# Patient Record
Sex: FEMALE | Race: WHITE | NOT HISPANIC OR LATINO | Employment: OTHER | URBAN - NONMETROPOLITAN AREA
[De-identification: names, ages, dates, MRNs, and addresses within clinical notes are randomized per-mention and may not be internally consistent; named-entity substitution may affect disease eponyms.]

---

## 2017-01-03 ENCOUNTER — TELEPHONE (OUTPATIENT)
Dept: NEUROLOGY | Facility: CLINIC | Age: 37
End: 2017-01-03

## 2017-01-03 NOTE — TELEPHONE ENCOUNTER
Pt called stating that she was started on omeprazole and procardia xl and feels that one of these has caused her to have a migraine and miss a day of work. She has ready on the internet that omeprazole should be used with caution in pts with migraines. She is requesting a list of medications that you approve for migraine pts to take.

## 2017-01-04 RX ORDER — RANITIDINE 150 MG/1
150 CAPSULE ORAL 2 TIMES DAILY PRN
Qty: 60 CAPSULE | Refills: 11 | Status: SHIPPED | OUTPATIENT
Start: 2017-01-04 | End: 2017-06-13

## 2017-01-04 NOTE — TELEPHONE ENCOUNTER
----- Message from PATRICIA Geiger sent at 1/4/2017  9:38 AM EST -----  Zantac sent.       ----- Message -----     From: Noris Nielson MA     Sent: 1/4/2017   9:34 AM       To: PATRICIA Geiger    Omeprazole is causing headaches. She read that any med. in that class may cause headaches. Zantac? Not interested in a referral at this time.

## 2017-01-10 ENCOUNTER — OFFICE VISIT (OUTPATIENT)
Dept: INTERNAL MEDICINE | Facility: CLINIC | Age: 37
End: 2017-01-10

## 2017-01-10 VITALS
WEIGHT: 233.19 LBS | SYSTOLIC BLOOD PRESSURE: 132 MMHG | HEIGHT: 67 IN | BODY MASS INDEX: 36.6 KG/M2 | RESPIRATION RATE: 14 BRPM | TEMPERATURE: 98.5 F | OXYGEN SATURATION: 99 % | DIASTOLIC BLOOD PRESSURE: 100 MMHG | HEART RATE: 94 BPM

## 2017-01-10 DIAGNOSIS — J06.9 ACUTE URI: Primary | ICD-10-CM

## 2017-01-10 DIAGNOSIS — J40 BRONCHITIS: ICD-10-CM

## 2017-01-10 PROCEDURE — 99213 OFFICE O/P EST LOW 20 MIN: CPT | Performed by: PHYSICIAN ASSISTANT

## 2017-01-10 RX ORDER — GUAIFENESIN AND DEXTROMETHORPHAN HYDROBROMIDE 600; 30 MG/1; MG/1
2 TABLET, EXTENDED RELEASE ORAL 2 TIMES DAILY PRN
Qty: 28 TABLET | Refills: 0 | Status: SHIPPED | OUTPATIENT
Start: 2017-01-10 | End: 2017-01-17

## 2017-01-10 RX ORDER — BENZONATATE 200 MG/1
CAPSULE ORAL
COMMUNITY
Start: 2017-01-07 | End: 2017-01-30

## 2017-01-10 RX ORDER — ALBUTEROL SULFATE 90 UG/1
2 AEROSOL, METERED RESPIRATORY (INHALATION) EVERY 4 HOURS PRN
Qty: 1 INHALER | Refills: 11 | Status: SHIPPED | OUTPATIENT
Start: 2017-01-10 | End: 2017-10-31 | Stop reason: SDUPTHER

## 2017-01-10 RX ORDER — AZITHROMYCIN 250 MG/1
TABLET, FILM COATED ORAL
Qty: 6 TABLET | Refills: 0 | Status: SHIPPED | OUTPATIENT
Start: 2017-01-10 | End: 2017-01-30

## 2017-01-10 NOTE — MR AVS SNAPSHOT
Nicole Altman   1/10/2017 2:45 PM   Office Visit    Provider:  PATRICIA Geiger   Department:  [facility] PRIMARY CARE   Dept Phone:  [phone]                Your Full Care Plan              Today's Medication Changes          These changes are accurate as of: 1/10/17  3:35 PM.  If you have any questions, ask your nurse or doctor.               New Medication(s)Ordered:     azithromycin 250 MG tablet   Commonly known as:  ZITHROMAX Z-RAJESH   Take 2 tablets the first day, then 1 tablet daily for 4 days.       guaifenesin-dextromethorphan  MG tablet sustained-release 12 hour tablet   Take 2 tablets by mouth 2 (Two) Times a Day As Needed (cough, congestion) for up to 7 days.         Medication(s)that have changed:     * albuterol (2.5 MG/3ML) 0.083% nebulizer solution   Commonly known as:  PROVENTIL   Albuterol Sulfate (2.5 MG/3ML) 0.083% Inhalation Nebulization Solution; Patient Sig: Albuterol Sulfate (2.5 MG/3ML) 0.083% Inhalation Nebulization Solution USE 1 UNIT DOSE EVERY 4-6 HOURS AS NEEDED FOR WHEEZING .; 1; 0; 05-Nov-2014; Active   What changed:  Another medication with the same name was added. Make sure you understand how and when to take each.       * albuterol 108 (90 BASE) MCG/ACT inhaler   Commonly known as:  PROVENTIL HFA;VENTOLIN HFA   Inhale 2 puffs Every 4 (Four) Hours As Needed for wheezing or shortness of air.   What changed:  You were already taking a medication with the same name, and this prescription was added. Make sure you understand how and when to take each.       * Notice:  This list has 2 medication(s) that are the same as other medications prescribed for you. Read the directions carefully, and ask your doctor or other care provider to review them with you.         Where to Get Your Medications      [facility] [place] [address] [phone] [facility] [phone]    [address] [facility] [address]     Phone:  [phone]     albuterol 108 (90 BASE) MCG/ACT inhaler    azithromycin 250 MG tablet    guaifenesin-dextromethorphan  MG tablet sustained-release 12 hour tablet                  Your Updated Medication List          This list is accurate as of: 1/10/17  3:35 PM.  Always use your most recent med list.                * albuterol (2.5 MG/3ML) 0.083% nebulizer solution   Commonly known as:  PROVENTIL       * albuterol 108 (90 BASE) MCG/ACT inhaler   Commonly known as:  PROVENTIL HFA;VENTOLIN HFA   Inhale 2 puffs Every 4 (Four) Hours As Needed for wheezing or shortness of air.       aspirin-acetaminophen-caffeine 250-250-65 MG per tablet   Commonly known as:  EXCEDRIN MIGRAINE       azithromycin 250 MG tablet   Commonly known as:  ZITHROMAX Z-RAJESH   Take 2 tablets the first day, then 1 tablet daily for 4 days.       benzonatate 200 MG capsule   Commonly known as:  TESSALON       buPROPion  MG 12 hr tablet   Commonly known as:  WELLBUTRIN SR   Take 1 tablet by mouth Every Morning.       calcium carbonate 500 MG chewable tablet   Commonly known as:  TUMS       guaifenesin-dextromethorphan  MG tablet sustained-release 12 hour tablet   Take 2 tablets by mouth 2 (Two) Times a Day As Needed (cough, congestion) for up to 7 days.       lamoTRIgine 150 MG tablet   Commonly known as:  LaMICtal   Take one tablet twice a day       lithium carbonate 300 MG capsule   Take 1 capsule in the morning and 2 capsules at night by mouth.       naproxen sodium 220 MG tablet   Commonly known as:  ALEVE       NIFEdipine XL 30 MG 24 hr tablet   Commonly known as:  PROCARDIA XL   Take 1 tablet by mouth Daily.       ondansetron ODT 4 MG disintegrating tablet   Commonly known as:  ZOFRAN-ODT   Take 1 tablet by mouth Every 8 (Eight) Hours As Needed for nausea or vomiting.       ranitidine 150 MG capsule   Commonly known as:  ZANTAC   Take 1 capsule by mouth 2 (Two) Times a Day  "As Needed for indigestion or heartburn.       * Notice:  This list has 2 medication(s) that are the same as other medications prescribed for you. Read the directions carefully, and ask your doctor or other care provider to review them with you.            You Were Diagnosed With        Codes Comments    Acute URI    -  Primary ICD-10-CM: J06.9  ICD-9-CM: 465.9     Bronchitis     ICD-10-CM: J40  ICD-9-CM: 490       Instructions     None    Patient Instructions History      MyChart Signup     Our records indicate that you have declined [facility] Epic[employer]art signup. If you would like to sign up for Epicrisishart, please email [email or web address] or call [phone] to obtain an activation code.             Other Info from Your Visit           Your Appointments     Jan 30, 2017  1:30 PM EST   Follow Up with PATRICIA Geiger   [facility] PRIMARY CARE (--)    [address]  [facility] [address] [phone]           Arrive 15 minutes prior to appointment.            Mar 21, 2017  3:30 PM EDT   Follow Up with Ben House MD, FAAN   [facility] NEUROLOGY (--)    [address]  [facility] [address] [phone]           Arrive 15 minutes prior to appointment.              Allergies     Haloperidol And Related  Anaphylaxis    Lisinopril      Nifedipine      Omeprazole      Sulfa Antibiotics      Blisters in mouth      Reason for Visit     Sinus Problem onset over the wknd. Seen at [facility] and 'ed as viral. Given T. Pearls which are not helping.     Hoarse     Fever up to 100.8      Vital Signs     Blood Pressure Pulse Temperature Respirations Height Weight    132/100 94 98.5 °F (36.9 °C) 14 67\" (170.2 cm) 233 lb 3 oz (106 kg)    Oxygen Saturation Body Mass Index Smoking Status             99% 36.52 kg/m2 Current Every Day Smoker         Problems and Diagnoses Noted     Acute upper respiratory infection    -  Primary    Bronchitis        "

## 2017-01-10 NOTE — PROGRESS NOTES
Nicole Altman is a 36 y.o. female.     Subjective   History of Present Illness   Here today with complaint of sore throat, hoarse voice, cough and fever for the last 5 days. Went to urgent care and was told it was viral. Given Tessalon Perles which are not helping. Fever up to 100.8 which reduces with Tylenol but comes right back. Denies body aches, HA, SOA, rhinorrhea or postnasal drip.  No influenza vaccine this year.         The following portions of the patient's history were reviewed and updated as appropriate: allergies, current medications, past family history, past medical history, past social history, past surgical history and problem list.    Review of Systems    Constitutional: Fever. Negative for appetite change, chills, fatigue and unexpected weight change.   HENT: sore throat.   Negative for congestion, ear pain, hearing loss, nosebleeds, postnasal drip, rhinorrhea, tinnitus and trouble swallowing.    Eyes: Negative for photophobia, discharge and visual disturbance.   Respiratory: Cough. Negative for chest tightness, shortness of breath and wheezing.    Cardiovascular: Negative for chest pain, palpitations and leg swelling.   Gastrointestinal: Negative for abdominal distention, abdominal pain, blood in stool, constipation, diarrhea, nausea and vomiting.   Endocrine: Negative for cold intolerance, heat intolerance, polydipsia, polyphagia and polyuria.   Musculoskeletal: Negative for arthralgias, back pain, joint swelling, myalgias, neck pain and neck stiffness.   Skin: Negative for color change, pallor, rash and wound.   Allergic/Immunologic: Negative for environmental allergies, food allergies and immunocompromised state.   Neurological: Negative for dizziness, tremors, seizures, weakness, numbness and headaches.   Hematological: Negative for adenopathy. Does not bruise/bleed easily.   Psychiatric/Behavioral: Negative for agitation, behavioral problems, confusion, hallucinations, self-injury and  "suicidal ideas. The patient is not nervous/anxious.      Objective    Physical Exam  Constitutional: Oriented to person, place, and time. Appears well-developed and well-nourished.   HENT: OP erythema without exudate, visible yellow PND  Head: No sinus tenderness. Normocephalic and atraumatic.   Eyes: EOM are normal. Pupils are equal, round, and reactive to light.   Neck: Normal range of motion. Neck supple.   Cardiovascular: Normal rate, regular rhythm and normal heart sounds.    Pulmonary/Chest: Rhonchi and wheezing diffusely. Effort normal. No respiratory distress.  Has no rales. Exhibits no chest wall tenderness.   Abdominal: Soft. Bowel sounds are normal. Exhibits no distension and no mass. There is no tenderness.   Musculoskeletal: Normal range of motion. Exhibits no tenderness.   Neurological: Alert and oriented to person, place, and time.   Skin: Skin is warm and dry.   Psychiatric: Has a normal mood and affect. Behavior is normal. Judgment and thought content normal.       Visit Vitals   • /100   • Pulse 94   • Temp 98.5 °F (36.9 °C)   • Resp 14   • Ht 67\" (170.2 cm)   • Wt 233 lb 3 oz (106 kg)   • SpO2 99%   • BMI 36.52 kg/m2       Nursing note and vitals reviewed.        Assessment/Plan   Nicole was seen today for sinus problem, hoarse and fever.    Diagnoses and all orders for this visit:    Acute URI  -     guaifenesin-dextromethorphan (MUCINEX DM)  MG tablet sustained-release 12 hour tablet; Take 2 tablets by mouth 2 (Two) Times a Day As Needed (cough, congestion) for up to 7 days.    Bronchitis  -     albuterol (PROVENTIL HFA;VENTOLIN HFA) 108 (90 BASE) MCG/ACT inhaler; Inhale 2 puffs Every 4 (Four) Hours As Needed for wheezing or shortness of air.  -     azithromycin (ZITHROMAX Z-RAJESH) 250 MG tablet; Take 2 tablets the first day, then 1 tablet daily for 4 days.  -     guaifenesin-dextromethorphan (MUCINEX DM)  MG tablet sustained-release 12 hour tablet; Take 2 tablets by mouth 2 " (Two) Times a Day As Needed (cough, congestion) for up to 7 days.

## 2017-01-30 ENCOUNTER — OFFICE VISIT (OUTPATIENT)
Dept: INTERNAL MEDICINE | Facility: CLINIC | Age: 37
End: 2017-01-30

## 2017-01-30 VITALS
BODY MASS INDEX: 36.76 KG/M2 | HEIGHT: 67 IN | TEMPERATURE: 98 F | HEART RATE: 92 BPM | RESPIRATION RATE: 16 BRPM | WEIGHT: 234.19 LBS | DIASTOLIC BLOOD PRESSURE: 84 MMHG | OXYGEN SATURATION: 97 % | SYSTOLIC BLOOD PRESSURE: 124 MMHG

## 2017-01-30 DIAGNOSIS — M77.11 LATERAL EPICONDYLITIS, RIGHT ELBOW: Primary | ICD-10-CM

## 2017-01-30 DIAGNOSIS — F31.60 BIPOLAR DISORDER, MIXED (HCC): ICD-10-CM

## 2017-01-30 DIAGNOSIS — J45.20 MILD INTERMITTENT ASTHMA WITHOUT COMPLICATION: ICD-10-CM

## 2017-01-30 DIAGNOSIS — Z79.899 LITHIUM USE: ICD-10-CM

## 2017-01-30 PROCEDURE — 99214 OFFICE O/P EST MOD 30 MIN: CPT | Performed by: PHYSICIAN ASSISTANT

## 2017-01-30 RX ORDER — BUPROPION HYDROCHLORIDE 150 MG/1
150 TABLET ORAL DAILY
Qty: 30 TABLET | Refills: 2 | Status: SHIPPED | OUTPATIENT
Start: 2017-01-30 | End: 2017-04-25

## 2017-01-30 RX ORDER — LITHIUM CARBONATE 300 MG/1
CAPSULE ORAL
Qty: 90 CAPSULE | Refills: 2 | Status: SHIPPED | OUTPATIENT
Start: 2017-01-30 | End: 2017-05-09 | Stop reason: SDUPTHER

## 2017-01-30 RX ORDER — LAMOTRIGINE 150 MG/1
TABLET ORAL
Qty: 60 TABLET | Refills: 2 | Status: SHIPPED | OUTPATIENT
Start: 2017-01-30 | End: 2017-05-09 | Stop reason: SDUPTHER

## 2017-01-30 RX ORDER — IBUPROFEN 200 MG
200 TABLET ORAL EVERY 6 HOURS PRN
Status: ON HOLD | COMMUNITY
End: 2017-09-09

## 2017-01-30 NOTE — MR AVS SNAPSHOT
Nicole Altman   1/30/2017 1:30 PM   Office Visit    Provider:  PATRICIA Geiger   Department:  [facility] PRIMARY CARE   Dept Phone:  [phone]                Your Full Care Plan              Today's Medication Changes          These changes are accurate as of: 1/30/17  2:15 PM.  If you have any questions, ask your nurse or doctor.               New Medication(s)Ordered:     buPROPion  MG 24 hr tablet   Commonly known as:  WELLBUTRIN XL   Take 1 tablet by mouth Daily.   Replaces:  buPROPion  MG 12 hr tablet         Stop taking medication(s)listed here:     azithromycin 250 MG tablet   Commonly known as:  ZITHROMAX Z-RAJESH           benzonatate 200 MG capsule   Commonly known as:  TESSALON           buPROPion  MG 12 hr tablet   Commonly known as:  WELLBUTRIN SR   Replaced by:  buPROPion  MG 24 hr tablet           naproxen sodium 220 MG tablet   Commonly known as:  ALEVE           NIFEdipine XL 30 MG 24 hr tablet   Commonly known as:  PROCARDIA XL                Where to Get Your Medications      These medications [facility] to [facility] [place] [address] - [address] [place] [address] - [phone]  - [phone] [address] [address]     Phone:  [phone]     buPROPion  MG 24 hr tablet    lamoTRIgine 150 MG tablet    lithium carbonate 300 MG capsule                  Your Updated Medication List          This list is accurate as of: 1/30/17  2:15 PM.  Always use your most recent med list.                * albuterol (2.5 MG/3ML) 0.083% nebulizer solution   Commonly known as:  PROVENTIL       * albuterol 108 (90 BASE) MCG/ACT inhaler   Commonly known as:  PROVENTIL HFA;VENTOLIN HFA   Inhale 2 puffs Every 4 (Four) Hours As Needed for wheezing or shortness of air.       aspirin-acetaminophen-caffeine 250-250-65 MG per tablet   Commonly known as:  EXCEDRIN MIGRAINE       buPROPion  MG 24 hr tablet    Commonly known as:  WELLBUTRIN XL   Take 1 tablet by mouth Daily.       calcium carbonate 500 MG chewable tablet   Commonly known as:  TUMS       ibuprofen 200 MG tablet   Commonly known as:  ADVIL,MOTRIN       lamoTRIgine 150 MG tablet   Commonly known as:  LaMICtal   Take one tablet twice a day       lithium carbonate 300 MG capsule   Take 1 capsule in the morning and 2 capsules at night by mouth.       ondansetron ODT 4 MG disintegrating tablet   Commonly known as:  ZOFRAN-ODT   Take 1 tablet by mouth Every 8 (Eight) Hours As Needed for nausea or vomiting.       ranitidine 150 MG capsule   Commonly known as:  ZANTAC   Take 1 capsule by mouth 2 (Two) Times a Day As Needed for indigestion or heartburn.       * Notice:  This list has 2 medication(s) that are the same as other medications prescribed for you. Read the directions carefully, and ask your doctor or other care provider to review them with you.            You Were Diagnosed With        Codes Comments    Lateral epicondylitis, right elbow    -  Primary ICD-10-CM: M77.11  ICD-9-CM: 726.32     Bipolar disorder, mixed     ICD-10-CM: F31.60  ICD-9-CM: 296.60     Lithium use     ICD-10-CM: Z79.899  ICD-9-CM: V58.69     Mild intermittent asthma without complication     ICD-10-CM: J45.20  ICD-9-CM: 493.90       Instructions     None    Patient Instructions History      MyChart Signup     Our records indicate that you have declined [facility] [employer] signup. If you would like to sign up for [employer], please email [email or web address] or call [phone] to obtain an activation code.             Other Info from Your Visit           Your Appointments     Mar 21, 2017  3:30 PM EDT   Follow Up with Ben House MD, FAAN   [facility] NEUROLOGY (--)    [address] [facility] [address] [address] [phone]           Arrive 15 minutes prior to appointment.              Allergies     Haloperidol And Related  Anaphylaxis     "Lisinopril      Nifedipine      Omeprazole      Sulfa Antibiotics      Blisters in mouth      Reason for Visit     Hypertension followup, needs pych. meds. refilled, out. Discuss referral.     Elbow Pain x several mos., discuss nebulizer.       Vital Signs     Blood Pressure Pulse Temperature Respirations Height Weight    124/84 92 98 °F (36.7 °C) 16 67\" (170.2 cm) 234 lb 3 oz (106 kg)    Oxygen Saturation Body Mass Index Smoking Status             97% 36.68 kg/m2 Current Every Day Smoker         Problems and Diagnoses Noted     Bipolar disorder, mixed    Right tennis elbow    -  Primary    Lithium use        Mild intermittent asthma          "

## 2017-01-30 NOTE — PROGRESS NOTES
Nicole Altman is a 36 y.o. female.     Subjective   History of Present Illness   Here today with concern of feeling very stressed and as if she is not handling life well in the last couple of days. Has been out of Wellbutrin and feels this could be the cause because nothing else has changed. Does not have any refills left on Lamictal and does not want to see the psychiatrist she was given an appointment with in [place]. Requesting refills of medications for Bipolar disorder.     Few months of right elbow pain. Hurt the elbow while moving a few months ago which continues to cause pain now with any lifting. Had x-ray at urgent care and was provided a pressure band which she never thought helped. Rest does not improve pain.     Uses nebulizer prn when she is sick due to asthma. Machine stopped working during last illness and is requesting a new one.     The following portions of the patient's history were reviewed and updated as appropriate: allergies, current medications, past family history, past medical history, past social history, past surgical history and problem list.    Review of Systems    Constitutional: Negative for appetite change, chills, fatigue, fever and unexpected weight change.   HENT: Negative for congestion, ear pain, hearing loss, nosebleeds, postnasal drip, rhinorrhea, sore throat, tinnitus and trouble swallowing.    Eyes: Negative for photophobia, discharge and visual disturbance.   Respiratory: Negative for cough, chest tightness, shortness of breath and wheezing.    Cardiovascular: Negative for chest pain, palpitations and leg swelling.   Gastrointestinal: Negative for abdominal distention, abdominal pain, blood in stool, constipation, diarrhea, nausea and vomiting.   Endocrine: Negative for cold intolerance, heat intolerance, polydipsia, polyphagia and polyuria.   Musculoskeletal: Right elbow pain. Negative for back pain, joint swelling, myalgias, neck pain and neck stiffness.   Skin:  "Negative for color change, pallor, rash and wound.   Allergic/Immunologic: Negative for environmental allergies, food allergies and immunocompromised state.   Neurological: Negative for dizziness, tremors, seizures, weakness, numbness and headaches.   Hematological: Negative for adenopathy. Does not bruise/bleed easily.   Psychiatric/Behavioral: Dysphoric mood, irritable. Negative for  behavioral problems, confusion, hallucinations, self-injury and suicidal ideas. The patient is not nervous/anxious.      Objective    Physical Exam  Constitutional: Oriented to person, place, and time. Appears well-developed and well-nourished.   HENT:   Head: Normocephalic and atraumatic.   Eyes: EOM are normal. Pupils are equal, round, and reactive to light.   Neck: Normal range of motion. Neck supple.   Cardiovascular: Normal rate, regular rhythm and normal heart sounds.    Pulmonary/Chest: Effort normal and breath sounds normal. No respiratory distress.  Has no wheezes or rales. Exhibits no chest wall tenderness.   Abdominal: Soft. Bowel sounds are normal. Exhibits no distension and no mass. There is no tenderness.   Musculoskeletal: right lateral epicondyle tenderness. Normal range of motion.   Neurological: Alert and oriented to person, place, and time.   Skin: Skin is warm and dry.   Psychiatric: Has a normal mood and affect. Behavior is normal. Judgment and thought content normal.       Visit Vitals   • /84   • Pulse 92   • Temp 98 °F (36.7 °C)   • Resp 16   • Ht 67\" (170.2 cm)   • Wt 234 lb 3 oz (106 kg)   • SpO2 97%   • BMI 36.68 kg/m2       Nursing note and vitals reviewed.        Assessment/Plan   Nicole was seen today for hypertension and elbow pain.    Diagnoses and all orders for this visit:    Lateral epicondylitis, right elbow  Rest, ice, NSAIDs prn. Follow up if not improving.     Bipolar disorder, mixed  -     lamoTRIgine (LaMICtal) 150 MG tablet; Take one tablet twice a day  -     lithium carbonate 300 MG " capsule; Take 1 capsule in the morning and 2 capsules at night by mouth.  -     buPROPion XL (WELLBUTRIN XL) 150 MG 24 hr tablet; Take 1 tablet by mouth Daily.    Lithium use  -     lithium carbonate 300 MG capsule; Take 1 capsule in the morning and 2 capsules at night by mouth.    Mild intermittent asthma without complication    Rx for new nebulizer.

## 2017-04-25 ENCOUNTER — OFFICE VISIT (OUTPATIENT)
Dept: INTERNAL MEDICINE | Facility: CLINIC | Age: 37
End: 2017-04-25

## 2017-04-25 VITALS
HEART RATE: 92 BPM | SYSTOLIC BLOOD PRESSURE: 160 MMHG | DIASTOLIC BLOOD PRESSURE: 110 MMHG | OXYGEN SATURATION: 94 % | TEMPERATURE: 99.2 F

## 2017-04-25 DIAGNOSIS — I10 UNCONTROLLED HYPERTENSION: Primary | ICD-10-CM

## 2017-04-25 PROCEDURE — 99213 OFFICE O/P EST LOW 20 MIN: CPT | Performed by: FAMILY MEDICINE

## 2017-04-25 RX ORDER — BUPROPION HYDROCHLORIDE 300 MG/1
TABLET ORAL
Refills: 0 | COMMUNITY
Start: 2017-04-18 | End: 2017-05-09 | Stop reason: SDUPTHER

## 2017-04-25 RX ORDER — LOSARTAN POTASSIUM 25 MG/1
25 TABLET ORAL DAILY
Qty: 30 TABLET | Refills: 3 | Status: SHIPPED | OUTPATIENT
Start: 2017-04-25 | End: 2017-05-18 | Stop reason: SDUPTHER
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
## 2017-04-25 NOTE — PATIENT INSTRUCTIONS
Managing Your High Blood Pressure  Blood pressure is a measurement of how forceful your blood is pressing against the walls of the arteries. Arteries are muscular tubes within the circulatory system. Blood pressure does not stay the same. Blood pressure rises when you are active, excited, or nervous; and it lowers during sleep and relaxation. If the numbers measuring your blood pressure stay above normal most of the time, you are at risk for health problems. High blood pressure (hypertension) is a long-term (chronic) condition in which blood pressure is elevated.  A blood pressure reading is recorded as two numbers, such as 120 over 80 (or 120/80). The first, higher number is called the systolic pressure. It is a measure of the pressure in your arteries as the heart beats. The second, lower number is called the diastolic pressure. It is a measure of the pressure in your arteries as the heart relaxes between beats.   Keeping your blood pressure in a normal range is important to your overall health and prevention of health problems, such as heart disease and stroke. When your blood pressure is uncontrolled, your heart has to work harder than normal. High blood pressure is a very common condition in adults because blood pressure tends to rise with age. Men and women are equally likely to have hypertension but at different times in life. Before age 45, men are more likely to have hypertension. After 65 years of age, women are more likely to have it. Hypertension is especially common in  Americans. This condition often has no signs or symptoms. The cause of the condition is usually not known. Your caregiver can help you come up with a plan to keep your blood pressure in a normal, healthy range.  BLOOD PRESSURE STAGES  Blood pressure is classified into four stages: normal, prehypertension, stage 1, and stage 2. Your blood pressure reading will be used to determine what type of treatment, if any, is necessary.  Appropriate treatment options are tied to these four stages:   Normal  · Systolic pressure (mm Hg): below 120.  · Diastolic pressure (mm Hg): below 80.  Prehypertension  · Systolic pressure (mm Hg): 120 to 139.  · Diastolic pressure (mm Hg): 80 to 89.  Stage 1  · Systolic pressure (mm Hg): 140 to 159.  · Diastolic pressure (mm Hg): 90 to 99.  Stage 2  · Systolic pressure (mm Hg): 160 or above.  · Diastolic pressure (mm Hg): 100 or above.  RISKS RELATED TO HIGH BLOOD PRESSURE  Managing your blood pressure is an important responsibility. Uncontrolled high blood pressure can lead to:  · A heart attack.  · A stroke.  · A weakened blood vessel (aneurysm).  · Heart failure.  · Kidney damage.  · Eye damage.  · Metabolic syndrome.  · Memory and concentration problems.  HOW TO MANAGE YOUR BLOOD PRESSURE  Blood pressure can be managed effectively with lifestyle changes and medicines (if needed). Your caregiver will help you come up with a plan to bring your blood pressure within a normal range. Your plan should include the following:  Education  · Read all information provided by your caregivers about how to control blood pressure.  · Educate yourself on the latest guidelines and treatment recommendations. New research is always being done to further define the risks and treatments for high blood pressure.  Lifestyle changes  · Control your weight.  · Avoid smoking.  · Stay physically active.  · Reduce the amount of salt in your diet.  · Reduce stress.  · Control any chronic conditions, such as high cholesterol or diabetes.  · Reduce your alcohol intake.  Medicines  · Several medicines (antihypertensive medicines) are available, if needed, to bring blood pressure within a normal range.  Communication  · Review all the medicines you take with your caregiver because there may be side effects or interactions.  · Talk with your caregiver about your diet, exercise habits, and other lifestyle factors that may be contributing to  high blood pressure.  · See your caregiver regularly. Your caregiver can help you create and adjust your plan for managing high blood pressure.  RECOMMENDATIONS FOR TREATMENT AND FOLLOW-UP   The following recommendations are based on current guidelines for managing high blood pressure in nonpregnant adults. Use these recommendations to identify the proper follow-up period or treatment option based on your blood pressure reading. You can discuss these options with your caregiver.  · Systolic pressure of 120 to 139 or diastolic pressure of 80 to 89: Follow up with your caregiver as directed.  · Systolic pressure of 140 to 160 or diastolic pressure of 90 to 100: Follow up with your caregiver within 2 months.  · Systolic pressure above 160 or diastolic pressure above 100: Follow up with your caregiver within 1 month.  · Systolic pressure above 180 or diastolic pressure above 110: Consider antihypertensive therapy; follow up with your caregiver within 1 week.  · Systolic pressure above 200 or diastolic pressure above 120: Begin antihypertensive therapy; follow up with your caregiver within 1 week.     This information is not intended to replace advice given to you by your health care provider. Make sure you discuss any questions you have with your health care provider.     Document Released: 09/11/2013 Document Reviewed: 09/11/2013  [employer] Patient Education ©2016 [employer]

## 2017-05-09 ENCOUNTER — OFFICE VISIT (OUTPATIENT)
Dept: INTERNAL MEDICINE | Facility: CLINIC | Age: 37
End: 2017-05-09

## 2017-05-09 VITALS
SYSTOLIC BLOOD PRESSURE: 148 MMHG | HEIGHT: 67 IN | DIASTOLIC BLOOD PRESSURE: 110 MMHG | HEART RATE: 84 BPM | WEIGHT: 236 LBS | BODY MASS INDEX: 37.04 KG/M2 | TEMPERATURE: 98.4 F | OXYGEN SATURATION: 99 %

## 2017-05-09 DIAGNOSIS — Z79.899 LITHIUM USE: ICD-10-CM

## 2017-05-09 DIAGNOSIS — I10 UNCONTROLLED HYPERTENSION: Primary | ICD-10-CM

## 2017-05-09 DIAGNOSIS — F32.81 PREMENSTRUAL DYSPHORIC DISORDER: ICD-10-CM

## 2017-05-09 DIAGNOSIS — F31.60 BIPOLAR DISORDER, MIXED (HCC): ICD-10-CM

## 2017-05-09 PROCEDURE — 99214 OFFICE O/P EST MOD 30 MIN: CPT | Performed by: PHYSICIAN ASSISTANT

## 2017-05-09 RX ORDER — FUROSEMIDE 20 MG/1
10 TABLET ORAL DAILY
Qty: 15 TABLET | Refills: 1 | Status: SHIPPED | OUTPATIENT
Start: 2017-05-09 | End: 2017-06-13

## 2017-05-09 RX ORDER — LITHIUM CARBONATE 300 MG/1
CAPSULE ORAL
Qty: 90 CAPSULE | Refills: 2 | Status: SHIPPED | OUTPATIENT
Start: 2017-05-09 | End: 2017-06-13 | Stop reason: SDUPTHER

## 2017-05-09 RX ORDER — BUPROPION HYDROCHLORIDE 300 MG/1
300 TABLET ORAL EVERY MORNING
Qty: 30 TABLET | Refills: 2 | Status: SHIPPED | OUTPATIENT
Start: 2017-05-09 | End: 2017-06-13 | Stop reason: SDUPTHER

## 2017-05-09 RX ORDER — LAMOTRIGINE 150 MG/1
TABLET ORAL
Qty: 60 TABLET | Refills: 2 | Status: SHIPPED | OUTPATIENT
Start: 2017-05-09 | End: 2017-06-13 | Stop reason: SDUPTHER

## 2017-05-09 RX ORDER — FLUOXETINE 10 MG/1
10 CAPSULE ORAL DAILY
Qty: 30 CAPSULE | Refills: 1 | Status: SHIPPED | OUTPATIENT
Start: 2017-05-09 | End: 2017-06-13 | Stop reason: SDUPTHER

## 2017-05-18 ENCOUNTER — TELEPHONE (OUTPATIENT)
Dept: INTERNAL MEDICINE | Facility: CLINIC | Age: 37
End: 2017-05-18

## 2017-05-18 DIAGNOSIS — I10 UNCONTROLLED HYPERTENSION: ICD-10-CM

## 2017-05-18 RX ORDER — LOSARTAN POTASSIUM 50 MG/1
50 TABLET ORAL DAILY
Qty: 30 TABLET | Refills: 5 | Status: SHIPPED | OUTPATIENT
Start: 2017-05-18 | End: 2017-06-13

## 2017-05-30 ENCOUNTER — TELEPHONE (OUTPATIENT)
Dept: INTERNAL MEDICINE | Facility: CLINIC | Age: 37
End: 2017-05-30

## 2017-06-10 ENCOUNTER — HOSPITAL ENCOUNTER (EMERGENCY)
Facility: HOSPITAL | Age: 37
Discharge: HOME | End: 2017-06-10
Attending: EMERGENCY MEDICINE | Admitting: EMERGENCY MEDICINE

## 2017-06-10 VITALS
HEART RATE: 66 BPM | BODY MASS INDEX: 39.24 KG/M2 | DIASTOLIC BLOOD PRESSURE: 92 MMHG | WEIGHT: 250 LBS | OXYGEN SATURATION: 97 % | SYSTOLIC BLOOD PRESSURE: 159 MMHG | RESPIRATION RATE: 20 BRPM | TEMPERATURE: 98.3 F | HEIGHT: 67 IN

## 2017-06-10 DIAGNOSIS — I10 ELEVATED BLOOD PRESSURE READING WITH DIAGNOSIS OF HYPERTENSION: ICD-10-CM

## 2017-06-10 DIAGNOSIS — R51.9 NONINTRACTABLE EPISODIC HEADACHE, UNSPECIFIED HEADACHE TYPE: Primary | ICD-10-CM

## 2017-06-10 PROCEDURE — 96372 THER/PROPH/DIAG INJ SC/IM: CPT

## 2017-06-10 PROCEDURE — 25010000002 KETOROLAC TROMETHAMINE PER 15 MG: Performed by: PHYSICIAN ASSISTANT

## 2017-06-10 PROCEDURE — 99284 EMERGENCY DEPT VISIT MOD MDM: CPT

## 2017-06-10 RX ORDER — ONDANSETRON 4 MG/1
4 TABLET, ORALLY DISINTEGRATING ORAL ONCE
Status: COMPLETED | OUTPATIENT
Start: 2017-06-10 | End: 2017-06-10

## 2017-06-10 RX ORDER — KETOROLAC TROMETHAMINE 30 MG/ML
60 INJECTION, SOLUTION INTRAMUSCULAR; INTRAVENOUS ONCE
Status: COMPLETED | OUTPATIENT
Start: 2017-06-10 | End: 2017-06-10

## 2017-06-10 RX ADMIN — METOPROLOL TARTRATE 25 MG: 25 TABLET ORAL at 19:47

## 2017-06-10 RX ADMIN — KETOROLAC TROMETHAMINE 60 MG: 60 INJECTION, SOLUTION INTRAMUSCULAR at 19:48

## 2017-06-10 RX ADMIN — ONDANSETRON 4 MG: 4 TABLET, ORALLY DISINTEGRATING ORAL at 19:48
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
## 2017-06-11 NOTE — DISCHARGE INSTRUCTIONS
Take your medication as directed.  Recheck with your family physician in 2-3 days if not improving.  Return to the ER for any increased headache, vomiting, fever or any other changes in your condition that concerns you.

## 2017-06-12 DIAGNOSIS — I10 UNCONTROLLED HYPERTENSION: ICD-10-CM

## 2017-06-13 ENCOUNTER — OFFICE VISIT (OUTPATIENT)
Dept: INTERNAL MEDICINE | Facility: CLINIC | Age: 37
End: 2017-06-13

## 2017-06-13 VITALS
SYSTOLIC BLOOD PRESSURE: 136 MMHG | BODY MASS INDEX: 37.2 KG/M2 | TEMPERATURE: 98.7 F | DIASTOLIC BLOOD PRESSURE: 94 MMHG | HEIGHT: 67 IN | OXYGEN SATURATION: 99 % | WEIGHT: 237 LBS | HEART RATE: 92 BPM

## 2017-06-13 DIAGNOSIS — R11.0 NAUSEA: ICD-10-CM

## 2017-06-13 DIAGNOSIS — I10 UNCONTROLLED HYPERTENSION: Primary | ICD-10-CM

## 2017-06-13 DIAGNOSIS — F32.81 PREMENSTRUAL DYSPHORIC DISORDER: ICD-10-CM

## 2017-06-13 DIAGNOSIS — F31.60 BIPOLAR DISORDER, MIXED (HCC): ICD-10-CM

## 2017-06-13 PROCEDURE — 99214 OFFICE O/P EST MOD 30 MIN: CPT | Performed by: PHYSICIAN ASSISTANT

## 2017-06-13 RX ORDER — LOSARTAN POTASSIUM 100 MG/1
100 TABLET ORAL DAILY
Qty: 90 TABLET | Refills: 1 | Status: SHIPPED | OUTPATIENT
Start: 2017-06-13 | End: 2017-06-26 | Stop reason: ALTCHOICE

## 2017-06-13 RX ORDER — LOSARTAN POTASSIUM 100 MG/1
100 TABLET ORAL DAILY
COMMUNITY
End: 2017-06-13

## 2017-06-13 RX ORDER — LAMOTRIGINE 150 MG/1
TABLET ORAL
Qty: 180 TABLET | Refills: 1 | Status: ON HOLD | OUTPATIENT
Start: 2017-06-13 | End: 2017-09-09

## 2017-06-13 RX ORDER — BUPROPION HYDROCHLORIDE 300 MG/1
300 TABLET ORAL EVERY MORNING
Qty: 90 TABLET | Refills: 1 | Status: SHIPPED | OUTPATIENT
Start: 2017-06-13 | End: 2017-11-27 | Stop reason: SDUPTHER

## 2017-06-13 RX ORDER — LITHIUM CARBONATE 300 MG/1
CAPSULE ORAL
Qty: 270 CAPSULE | Refills: 1 | Status: SHIPPED | OUTPATIENT
Start: 2017-06-13 | End: 2017-06-26 | Stop reason: SDUPTHER

## 2017-06-13 RX ORDER — FLUOXETINE HYDROCHLORIDE 20 MG/1
20 CAPSULE ORAL DAILY
Qty: 90 CAPSULE | Refills: 1 | Status: SHIPPED | OUTPATIENT
Start: 2017-06-13 | End: 2017-06-26 | Stop reason: SDUPTHER

## 2017-06-13 RX ORDER — ONDANSETRON 8 MG/1
TABLET, ORALLY DISINTEGRATING ORAL
Qty: 12 TABLET | Refills: 5 | Status: SHIPPED | OUTPATIENT
Start: 2017-06-13 | End: 2018-05-18 | Stop reason: SDUPTHER

## 2017-06-13 NOTE — PROGRESS NOTES
Nicole Altman is a 36 y.o. female.     Subjective   History of Present Illness   Here today for follow up of hypertension, PMDD, and ER visit from 6/10/17 due to migraine and elevated blood pressure.  Blood pressure was very elevated when in the ER but it lowered after she was given Metoprolol. Home BP readings have been better controlled since her Losartan was raised to 100 mg daily along with Lasix 10 mg daily.     PMDD: began Prozac 10 mg daily about 1 month ago and notes that her depression has improved and her last pre-menstrual symptoms were much more tolerable than the previous month but there is still room for improvement.  Denies suicidal ideation. Sleeping well.   Bipolar disorder well controlled with Wellbutrin, Lithium and Lamictal. Requesting 90 day supply refills.         The following portions of the patient's history were reviewed and updated as appropriate: allergies, current medications, past family history, past medical history, past social history, past surgical history and problem list.    Review of Systems    Constitutional: Negative for appetite change, chills, fatigue, fever and unexpected weight change.   HENT: Negative for congestion, ear pain, hearing loss, nosebleeds, postnasal drip, rhinorrhea, sore throat, tinnitus and trouble swallowing.    Eyes: Negative for photophobia, discharge and visual disturbance.   Respiratory: Negative for cough, chest tightness, shortness of breath and wheezing.    Cardiovascular: Negative for chest pain, palpitations and leg swelling.   Gastrointestinal: Negative for abdominal distention, abdominal pain, blood in stool, constipation, diarrhea, nausea and vomiting.   Endocrine: Negative for cold intolerance, heat intolerance, polydipsia, polyphagia and polyuria.   Musculoskeletal: Negative for arthralgias, back pain, joint swelling, myalgias, neck pain and neck stiffness.   Skin: Negative for color change, pallor, rash and wound.   Allergic/Immunologic:  "Negative for environmental allergies, food allergies and immunocompromised state.   Neurological: headaches. Negative for dizziness, tremors, seizures, weakness, numbness  Hematological: Negative for adenopathy. Does not bruise/bleed easily.   Psychiatric/Behavioral: dysphoric mood- improving. Negative for sleep disturbances, agitation, behavioral problems, confusion, hallucinations, self-injury and suicidal ideas. The patient is not nervous/anxious.      Objective    Physical Exam  Constitutional: Oriented to person, place, and time. Appears well-developed and well-nourished.   HENT:   Head: Normocephalic and atraumatic.   Eyes: EOM are normal. Pupils are equal, round, and reactive to light.   Neck: Normal range of motion. Neck supple.   Cardiovascular: Normal rate, regular rhythm and normal heart sounds.    Pulmonary/Chest: Effort normal and breath sounds normal. No respiratory distress.  Has no wheezes or rales. Exhibits no chest wall tenderness.   Abdominal: Soft. Bowel sounds are normal. Exhibits no distension and no mass. There is no tenderness.   Musculoskeletal: Normal range of motion. Exhibits no tenderness.   Neurological: Alert and oriented to person, place, and time.   Skin: Skin is warm and dry.   Psychiatric: Has a normal mood and affect. Behavior is normal. Judgment and thought content normal.       /94  Pulse 92  Temp 98.7 °F (37.1 °C)  Ht 67\" (170.2 cm)  Wt 237 lb (108 kg)  SpO2 99%  BMI 37.12 kg/m2    Nursing note and vitals reviewed.        Assessment/Plan   Nicole was seen today for follow-up and hypertension.    Diagnoses and all orders for this visit:    Uncontrolled hypertension  -     Continue Losartan and Lasix daily.  losartan (COZAAR) 100 MG tablet; Take 1 tablet by mouth Daily.  Send [facility] message in 3 days with update on blood pressure readings.     Premenstrual dysphoric disorder  -     PMDD improved with Prozac 10 but reports room for improvement. Increase dose: " FLUoxetine (PROzac) 20 MG capsule; Take 1 capsule by mouth Daily.    Nausea  -     ondansetron ODT (ZOFRAN ODT) 8 MG disintegrating tablet; Dissolve 1/2 to 1 tablet SL every 8 hours as needed for nausea.    Bipolar disorder, mixed  -     lithium carbonate 300 MG capsule; Take 1 capsule in the morning and 2 capsules at night by mouth.  -     lamoTRIgine (LaMICtal) 150 MG tablet; Take one tablet twice a day  -     buPROPion XL (WELLBUTRIN XL) 300 MG 24 hr tablet; Take 1 tablet by mouth Every Morning.      F/u in office in 3 month or sooner if needed.

## 2017-06-16 ENCOUNTER — TELEPHONE (OUTPATIENT)
Dept: INTERNAL MEDICINE | Facility: CLINIC | Age: 37
End: 2017-06-16

## 2017-06-16 DIAGNOSIS — Z79.899 LITHIUM USE: Primary | ICD-10-CM

## 2017-06-16 DIAGNOSIS — F31.32 BIPOLAR AFFECTIVE DISORDER, CURRENTLY DEPRESSED, MODERATE (HCC): ICD-10-CM

## 2017-06-25 ENCOUNTER — HOSPITAL ENCOUNTER (EMERGENCY)
Facility: HOSPITAL | Age: 37
Discharge: HOME | End: 2017-06-25
Attending: EMERGENCY MEDICINE | Admitting: EMERGENCY MEDICINE

## 2017-06-25 VITALS
DIASTOLIC BLOOD PRESSURE: 87 MMHG | BODY MASS INDEX: 36.1 KG/M2 | WEIGHT: 230 LBS | HEIGHT: 67 IN | OXYGEN SATURATION: 100 % | TEMPERATURE: 98.6 F | HEART RATE: 68 BPM | SYSTOLIC BLOOD PRESSURE: 136 MMHG | RESPIRATION RATE: 20 BRPM

## 2017-06-25 DIAGNOSIS — R51.9 NONINTRACTABLE HEADACHE, UNSPECIFIED CHRONICITY PATTERN, UNSPECIFIED HEADACHE TYPE: Primary | ICD-10-CM

## 2017-06-25 LAB
HOLD SPECIMEN: NORMAL
HOLD SPECIMEN: NORMAL
WHOLE BLOOD HOLD SPECIMEN: NORMAL
WHOLE BLOOD HOLD SPECIMEN: NORMAL

## 2017-06-25 PROCEDURE — 96361 HYDRATE IV INFUSION ADD-ON: CPT

## 2017-06-25 PROCEDURE — 25010000002 KETOROLAC TROMETHAMINE PER 15 MG: Performed by: EMERGENCY MEDICINE

## 2017-06-25 PROCEDURE — 96374 THER/PROPH/DIAG INJ IV PUSH: CPT

## 2017-06-25 PROCEDURE — 25010000002 ONDANSETRON PER 1 MG: Performed by: EMERGENCY MEDICINE

## 2017-06-25 PROCEDURE — 99284 EMERGENCY DEPT VISIT MOD MDM: CPT

## 2017-06-25 PROCEDURE — 96375 TX/PRO/DX INJ NEW DRUG ADDON: CPT

## 2017-06-25 RX ORDER — ACETAMINOPHEN 325 MG/1
650 TABLET ORAL ONCE
Status: COMPLETED | OUTPATIENT
Start: 2017-06-25 | End: 2017-06-25

## 2017-06-25 RX ORDER — KETOROLAC TROMETHAMINE 30 MG/ML
30 INJECTION, SOLUTION INTRAMUSCULAR; INTRAVENOUS ONCE
Status: COMPLETED | OUTPATIENT
Start: 2017-06-25 | End: 2017-06-25

## 2017-06-25 RX ORDER — SODIUM CHLORIDE 0.9 % (FLUSH) 0.9 %
10 SYRINGE (ML) INJECTION AS NEEDED
Status: DISCONTINUED | OUTPATIENT
Start: 2017-06-25 | End: 2017-06-25 | Stop reason: HOSPADM

## 2017-06-25 RX ORDER — ONDANSETRON 2 MG/ML
4 INJECTION INTRAMUSCULAR; INTRAVENOUS ONCE
Status: COMPLETED | OUTPATIENT
Start: 2017-06-25 | End: 2017-06-25

## 2017-06-25 RX ADMIN — KETOROLAC TROMETHAMINE 30 MG: 30 INJECTION, SOLUTION INTRAMUSCULAR; INTRAVENOUS at 16:04

## 2017-06-25 RX ADMIN — SODIUM CHLORIDE 500 ML: 9 INJECTION, SOLUTION INTRAVENOUS at 16:00

## 2017-06-25 RX ADMIN — ACETAMINOPHEN 650 MG: 325 TABLET, FILM COATED ORAL at 16:03

## 2017-06-25 RX ADMIN — ONDANSETRON 4 MG: 2 INJECTION INTRAMUSCULAR; INTRAVENOUS at 16:06
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
## 2017-06-26 DIAGNOSIS — F32.81 PREMENSTRUAL DYSPHORIC DISORDER: ICD-10-CM

## 2017-06-26 DIAGNOSIS — F31.60 BIPOLAR DISORDER, MIXED (HCC): ICD-10-CM

## 2017-06-26 DIAGNOSIS — I10 UNCONTROLLED HYPERTENSION: Primary | ICD-10-CM

## 2017-06-26 RX ORDER — IRBESARTAN 300 MG/1
300 TABLET ORAL DAILY
Qty: 30 TABLET | Refills: 11 | Status: ON HOLD | OUTPATIENT
Start: 2017-06-26 | End: 2018-06-02 | Stop reason: DRUGHIGH

## 2017-06-26 RX ORDER — LITHIUM CARBONATE 300 MG/1
CAPSULE ORAL
Qty: 90 CAPSULE | Refills: 0 | Status: ON HOLD | OUTPATIENT
Start: 2017-06-26 | End: 2017-09-09

## 2017-06-26 RX ORDER — FLUOXETINE HYDROCHLORIDE 20 MG/1
20 CAPSULE ORAL DAILY
Qty: 30 CAPSULE | Refills: 0 | Status: SHIPPED | OUTPATIENT
Start: 2017-06-26 | End: 2017-07-06 | Stop reason: SDUPTHER

## 2017-06-27 ENCOUNTER — TELEPHONE (OUTPATIENT)
Dept: NEUROLOGY | Facility: CLINIC | Age: 37
End: 2017-06-27

## 2017-06-27 NOTE — TELEPHONE ENCOUNTER
----- Message from Nicole Altman sent at 6/26/2017  7:39 PM EDT -----  Regarding: RE: Non-Urgent Medical Question  Contact: [phone]  Dr Faisal Gonzalez (Psychiatrist)    Lamictal for mood stabilization     Lithium for decreasing suicidal ideation     This combination has been working well for over a decade.     ----- Message -----  From: Brent Pardo DO  Sent: 6/26/17, 7:25 PM  To: Nicole Altman  Subject: RE: Non-Urgent Medical Question    I'm going to change your BP meds from losartan to a different medication in the same class of action, that's a bit more aggressive and see if that brings down your BP any better. Please check with your pharmacy and stop the losartan when you start the new medication, irbesartan.. Who initially put you on lamictal and lithium together?   Thank you, Dr. Pardo.    ----- Message -----     From: Nicole Altman     Sent: 6/20/2017  8:10 PM EDT       To: PATRICIA Geiger  Subject: Non-Urgent Medical Question    Hello.   A little late getting this together, surprise surprise.   Wasn't very good at recording results either but this is what I have:  Lowest: 138/94  Highest: 154/106    Also, [facility] wants to know why I'm taking lithium and lamictal together before they will fill.     #insert sarcastic comment#    This is cool.   Thanks.   Sincerely,  Neno Altman

## 2017-07-06 DIAGNOSIS — F32.81 PREMENSTRUAL DYSPHORIC DISORDER: ICD-10-CM

## 2017-07-06 RX ORDER — FLUOXETINE 10 MG/1
10 CAPSULE ORAL DAILY
Qty: 14 CAPSULE | Refills: 0 | Status: SHIPPED | OUTPATIENT
Start: 2017-07-06 | End: 2017-07-11 | Stop reason: SDUPTHER

## 2017-07-11 DIAGNOSIS — F32.81 PREMENSTRUAL DYSPHORIC DISORDER: ICD-10-CM

## 2017-07-12 RX ORDER — FLUOXETINE 10 MG/1
CAPSULE ORAL
Qty: 30 CAPSULE | Refills: 5 | Status: SHIPPED | OUTPATIENT
Start: 2017-07-12 | End: 2017-08-03 | Stop reason: SINTOL

## 2017-07-13 RX ORDER — METOPROLOL SUCCINATE 50 MG/1
50 TABLET, EXTENDED RELEASE ORAL DAILY
Qty: 30 TABLET | Refills: 5 | Status: SHIPPED | OUTPATIENT
Start: 2017-07-13 | End: 2017-08-14 | Stop reason: SDUPTHER

## 2017-07-31 RX ORDER — FUROSEMIDE 20 MG/1
10 TABLET ORAL DAILY
Qty: 15 TABLET | Refills: 3 | Status: SHIPPED | OUTPATIENT
Start: 2017-07-31 | End: 2017-09-10 | Stop reason: HOSPADM

## 2017-08-04 ENCOUNTER — PROCEDURE VISIT (OUTPATIENT)
Dept: NEUROLOGY | Facility: CLINIC | Age: 37
End: 2017-08-04

## 2017-08-04 VITALS
DIASTOLIC BLOOD PRESSURE: 88 MMHG | HEIGHT: 67 IN | WEIGHT: 235.6 LBS | BODY MASS INDEX: 36.98 KG/M2 | OXYGEN SATURATION: 98 % | HEART RATE: 80 BPM | SYSTOLIC BLOOD PRESSURE: 132 MMHG

## 2017-08-04 DIAGNOSIS — G43.019 INTRACTABLE MIGRAINE WITHOUT AURA AND WITHOUT STATUS MIGRAINOSUS: Primary | ICD-10-CM

## 2017-08-04 DIAGNOSIS — M54.2 CERVICALGIA: ICD-10-CM

## 2017-08-04 PROCEDURE — 20553 NJX 1/MLT TRIGGER POINTS 3/>: CPT | Performed by: NURSE PRACTITIONER

## 2017-08-04 NOTE — PROGRESS NOTES
Patient is suffering from headache and myofacial pain syndrome. Risks and benefits of the Trigger point injection procedure have been explained to the patient. States trigger points are helping neck spasms and headaches. Patient has no contraindications such as bleed diathesis, recent acute trauma at the muscle sites, anesthetic allergy or anticoagulation.  Mechanism of trigger point injection has been explained to patient.     Procedure Note:  1.         Patient was positioned comfortably  2.         Before injections are started, 10 Trigger Points sites are identified throughout the bilateral upper trapezius muscle, levator scapulae, and erector spinae muscles.    3.         Overlying skin area was cleaned with Alcohol swab                                                                                                              4.         Before injection, trigger points sites were palpated for local twitch and referred pain to confirms placement                         5.         Local anesthetic was mixed with Depo-Medrol (5 cc of Marcaine 0.5% mixed with 5 cc of Depo-Medrol at 40 mg/ml - for a total of 10 cc)  6.         30 gauge ½ inch needle was utilized to ensure patient comfort          7.         I started with the most tender spot in above mentioned Trigger Points   1.   Localize most tender spot within taut muscle-fibers  2.   Fix tender spot between fingers (1-2 cm in size)   1.   Prevents from rolling away from needle  2.   Controls subcutaneous bleeding  3.   Before injection, patient was instructed of possible pain on injection  4.   Direct needle at 30 degree angle off skin   8.         Insert needle into skin 1-2 cm from Trigger Point   9.         Advance needle into Trigger Point   10.       Use 1cc anesthetic at each Trigger Points identified in step #2  11.       Redirect needle and reinject                                                                                              1.    Withdraw needle to subcutaneous tissue  2.   Redirect needle into adjacent tender areas  3.   Repeat until local twitch or tautness resolves  12.   After each of the 10 injections I held direct pressure at injection site for 2 minutes to prevents hematoma formation  13.   The same procedure was repeated for other Tender Points located in the upper trapezius muscle, levator scapulae and erector spinae bilaterally  14.   Patient was instructed to gently stretch injected areas to full active range of motion in all directions and was instructed to repeat range of motion three times after injection  15.   Post-Procedure patient was instructed to avoid over-using injected area for 3-4 days   1.   Maintain active range of motion of injected muscle  2.   Patient applies ice to injected areas for a few hours  3.   Anticipate post-injection soreness for 3-4 days  There was no evidence of complications from injection was noted such as local Skin Infection  at injection site or local hematoma at injection site

## 2017-08-07 RX ORDER — BUPIVACAINE HYDROCHLORIDE 5 MG/ML
5 INJECTION, SOLUTION PERINEURAL ONCE
Status: COMPLETED | OUTPATIENT
Start: 2017-08-07 | End: 2017-08-07

## 2017-08-07 RX ORDER — METHYLPREDNISOLONE ACETATE 40 MG/ML
200 INJECTION, SUSPENSION INTRA-ARTICULAR; INTRALESIONAL; INTRAMUSCULAR; SOFT TISSUE ONCE
Status: COMPLETED | OUTPATIENT
Start: 2017-08-07 | End: 2017-08-07

## 2017-08-07 RX ADMIN — METHYLPREDNISOLONE ACETATE 200 MG: 40 INJECTION, SUSPENSION INTRA-ARTICULAR; INTRALESIONAL; INTRAMUSCULAR; SOFT TISSUE at 19:28

## 2017-08-07 RX ADMIN — BUPIVACAINE HYDROCHLORIDE 5 ML: 5 INJECTION, SOLUTION PERINEURAL at 19:29

## 2017-08-14 RX ORDER — METOPROLOL SUCCINATE 100 MG/1
100 TABLET, EXTENDED RELEASE ORAL DAILY
Qty: 30 TABLET | Refills: 5 | Status: ON HOLD | OUTPATIENT
Start: 2017-08-14 | End: 2017-09-09

## 2017-09-08 ENCOUNTER — HOSPITAL ENCOUNTER (EMERGENCY)
Facility: HOSPITAL | Age: 37
Discharge: PSYCHIATRIC OR SUBSTANCE USE FACILITY | End: 2017-09-09
Attending: EMERGENCY MEDICINE | Admitting: EMERGENCY MEDICINE

## 2017-09-08 VITALS
SYSTOLIC BLOOD PRESSURE: 141 MMHG | RESPIRATION RATE: 18 BRPM | DIASTOLIC BLOOD PRESSURE: 89 MMHG | HEART RATE: 82 BPM | BODY MASS INDEX: 36.1 KG/M2 | OXYGEN SATURATION: 98 % | TEMPERATURE: 98.5 F | WEIGHT: 230 LBS | HEIGHT: 67 IN

## 2017-09-08 DIAGNOSIS — R45.851 SUICIDAL IDEATIONS: Primary | ICD-10-CM

## 2017-09-08 LAB
ALBUMIN SERPL-MCNC: 4.1 G/DL (ref 3.5–5)
ALBUMIN/GLOB SERPL: 1.4 G/DL (ref 1–2)
ALP SERPL-CCNC: 89 U/L (ref 38–126)
ALT SERPL W P-5'-P-CCNC: 26 U/L (ref 13–69)
AMPHET+METHAMPHET UR QL: NEGATIVE
AMPHETAMINES UR QL: NEGATIVE
ANION GAP SERPL CALCULATED.3IONS-SCNC: 10.8 MMOL/L
APAP SERPL-MCNC: <10 MCG/ML
AST SERPL-CCNC: 13 U/L (ref 15–46)
B-HCG UR QL: NEGATIVE
BACTERIA UR QL AUTO: ABNORMAL /HPF
BARBITURATES UR QL SCN: NEGATIVE
BASOPHILS # BLD AUTO: 0.09 10*3/MM3 (ref 0–0.2)
BASOPHILS NFR BLD AUTO: 0.8 % (ref 0–2.5)
BENZODIAZ UR QL SCN: NEGATIVE
BILIRUB SERPL-MCNC: 0.2 MG/DL (ref 0.2–1.3)
BILIRUB UR QL STRIP: NEGATIVE
BUN BLD-MCNC: 11 MG/DL (ref 7–20)
BUN/CREAT SERPL: 10 (ref 7.1–23.5)
BUPRENORPHINE SERPL-MCNC: NEGATIVE NG/ML
CALCIUM SPEC-SCNC: 9.6 MG/DL (ref 8.4–10.2)
CANNABINOIDS SERPL QL: NEGATIVE
CHLORIDE SERPL-SCNC: 107 MMOL/L (ref 98–107)
CLARITY UR: CLEAR
CO2 SERPL-SCNC: 25 MMOL/L (ref 26–30)
COCAINE UR QL: NEGATIVE
COLOR UR: YELLOW
CREAT BLD-MCNC: 1.1 MG/DL (ref 0.6–1.3)
DEPRECATED RDW RBC AUTO: 45.4 FL (ref 37–54)
EOSINOPHIL # BLD AUTO: 0.38 10*3/MM3 (ref 0–0.7)
EOSINOPHIL NFR BLD AUTO: 3.4 % (ref 0–7)
ERYTHROCYTE [DISTWIDTH] IN BLOOD BY AUTOMATED COUNT: 13.5 % (ref 11.5–14.5)
ETHANOL BLD-MCNC: <10 MG/DL
ETHANOL UR QL: <0.01 %
GFR SERPL CREATININE-BSD FRML MDRD: 55 ML/MIN/1.73
GLOBULIN UR ELPH-MCNC: 3 GM/DL
GLUCOSE BLD-MCNC: 102 MG/DL (ref 74–98)
GLUCOSE UR STRIP-MCNC: NEGATIVE MG/DL
HCT VFR BLD AUTO: 39.2 % (ref 37–47)
HGB BLD-MCNC: 12.5 G/DL (ref 12–16)
HGB UR QL STRIP.AUTO: ABNORMAL
HYALINE CASTS UR QL AUTO: ABNORMAL /LPF
IMM GRANULOCYTES # BLD: 0.05 10*3/MM3 (ref 0–0.06)
IMM GRANULOCYTES NFR BLD: 0.5 % (ref 0–0.6)
KETONES UR QL STRIP: NEGATIVE
LEUKOCYTE ESTERASE UR QL STRIP.AUTO: NEGATIVE
LYMPHOCYTES # BLD AUTO: 2.64 10*3/MM3 (ref 0.6–3.4)
LYMPHOCYTES NFR BLD AUTO: 23.8 % (ref 10–50)
MCH RBC QN AUTO: 29.2 PG (ref 27–31)
MCHC RBC AUTO-ENTMCNC: 31.9 G/DL (ref 30–37)
MCV RBC AUTO: 91.6 FL (ref 81–99)
METHADONE UR QL SCN: NEGATIVE
MONOCYTES # BLD AUTO: 0.69 10*3/MM3 (ref 0–0.9)
MONOCYTES NFR BLD AUTO: 6.2 % (ref 0–12)
NEUTROPHILS # BLD AUTO: 7.25 10*3/MM3 (ref 2–6.9)
NEUTROPHILS NFR BLD AUTO: 65.3 % (ref 37–80)
NITRITE UR QL STRIP: NEGATIVE
NRBC BLD MANUAL-RTO: 0 /100 WBC (ref 0–0)
OPIATES UR QL: NEGATIVE
OXYCODONE UR QL SCN: NEGATIVE
PCP UR QL SCN: NEGATIVE
PH UR STRIP.AUTO: 6 [PH] (ref 5–8)
PLATELET # BLD AUTO: 356 10*3/MM3 (ref 130–400)
PMV BLD AUTO: 10.2 FL (ref 6–12)
POTASSIUM BLD-SCNC: 3.8 MMOL/L (ref 3.5–5.1)
PROPOXYPH UR QL: NEGATIVE
PROT SERPL-MCNC: 7.1 G/DL (ref 6.3–8.2)
PROT UR QL STRIP: NEGATIVE
RBC # BLD AUTO: 4.28 10*6/MM3 (ref 4.2–5.4)
RBC # UR: ABNORMAL /HPF
REF LAB TEST METHOD: ABNORMAL
SALICYLATES SERPL-MCNC: <1 MG/DL (ref 2.8–20)
SODIUM BLD-SCNC: 139 MMOL/L (ref 137–145)
SP GR UR STRIP: 1.01 (ref 1–1.03)
SQUAMOUS #/AREA URNS HPF: ABNORMAL /HPF
TRICYCLICS UR QL SCN: NEGATIVE
UROBILINOGEN UR QL STRIP: ABNORMAL
WBC NRBC COR # BLD: 11.1 10*3/MM3 (ref 4.8–10.8)
WBC UR QL AUTO: ABNORMAL /HPF

## 2017-09-08 PROCEDURE — 80053 COMPREHEN METABOLIC PANEL: CPT | Performed by: EMERGENCY MEDICINE

## 2017-09-08 PROCEDURE — 80307 DRUG TEST PRSMV CHEM ANLYZR: CPT | Performed by: EMERGENCY MEDICINE

## 2017-09-08 PROCEDURE — 87086 URINE CULTURE/COLONY COUNT: CPT | Performed by: EMERGENCY MEDICINE

## 2017-09-08 PROCEDURE — 80178 ASSAY OF LITHIUM: CPT | Performed by: EMERGENCY MEDICINE

## 2017-09-08 PROCEDURE — 81001 URINALYSIS AUTO W/SCOPE: CPT | Performed by: EMERGENCY MEDICINE

## 2017-09-08 PROCEDURE — 99283 EMERGENCY DEPT VISIT LOW MDM: CPT

## 2017-09-08 PROCEDURE — 81025 URINE PREGNANCY TEST: CPT | Performed by: EMERGENCY MEDICINE

## 2017-09-08 PROCEDURE — 85025 COMPLETE CBC W/AUTO DIFF WBC: CPT | Performed by: EMERGENCY MEDICINE

## 2017-09-08 PROCEDURE — 80306 DRUG TEST PRSMV INSTRMNT: CPT | Performed by: EMERGENCY MEDICINE

## 2017-09-08 RX ORDER — METHYLPREDNISOLONE 4 MG/1
TABLET ORAL
Qty: 1 EACH | Refills: 0 | Status: ON HOLD | OUTPATIENT
Start: 2017-09-08 | End: 2017-09-09

## 2017-09-09 ENCOUNTER — HOSPITAL ENCOUNTER (INPATIENT)
Facility: HOSPITAL | Age: 37
LOS: 1 days | Discharge: HOME | End: 2017-09-10
Attending: PSYCHIATRY & NEUROLOGY | Admitting: PSYCHIATRY & NEUROLOGY

## 2017-09-09 PROBLEM — F32.9 MDD (MAJOR DEPRESSIVE DISORDER): Status: ACTIVE | Noted: 2017-09-09

## 2017-09-09 PROCEDURE — 99223 1ST HOSP IP/OBS HIGH 75: CPT | Performed by: PSYCHIATRY & NEUROLOGY

## 2017-09-09 PROCEDURE — 93005 ELECTROCARDIOGRAM TRACING: CPT | Performed by: EMERGENCY MEDICINE

## 2017-09-09 RX ORDER — TRAZODONE HYDROCHLORIDE 50 MG/1
50 TABLET ORAL NIGHTLY PRN
Status: DISCONTINUED | OUTPATIENT
Start: 2017-09-09 | End: 2017-09-10 | Stop reason: HOSPADM

## 2017-09-09 RX ORDER — LAMOTRIGINE 100 MG/1
150 TABLET ORAL DAILY
Status: DISCONTINUED | OUTPATIENT
Start: 2017-09-09 | End: 2017-09-09

## 2017-09-09 RX ORDER — METOPROLOL SUCCINATE 50 MG/1
50 TABLET, EXTENDED RELEASE ORAL DAILY
COMMUNITY
End: 2018-05-11 | Stop reason: SDUPTHER

## 2017-09-09 RX ORDER — LITHIUM CARBONATE 300 MG/1
600 CAPSULE ORAL NIGHTLY
COMMUNITY
End: 2017-10-30 | Stop reason: SDUPTHER

## 2017-09-09 RX ORDER — ALUMINA, MAGNESIA, AND SIMETHICONE 2400; 2400; 240 MG/30ML; MG/30ML; MG/30ML
15 SUSPENSION ORAL EVERY 6 HOURS PRN
Status: DISCONTINUED | OUTPATIENT
Start: 2017-09-09 | End: 2017-09-10 | Stop reason: HOSPADM

## 2017-09-09 RX ORDER — HYDROXYZINE 50 MG/1
50 TABLET, FILM COATED ORAL EVERY 6 HOURS PRN
Status: DISCONTINUED | OUTPATIENT
Start: 2017-09-09 | End: 2017-09-10 | Stop reason: HOSPADM

## 2017-09-09 RX ORDER — FAMOTIDINE 20 MG/1
20 TABLET, FILM COATED ORAL 2 TIMES DAILY PRN
Status: DISCONTINUED | OUTPATIENT
Start: 2017-09-09 | End: 2017-09-10 | Stop reason: HOSPADM

## 2017-09-09 RX ORDER — NICOTINE 21 MG/24HR
1 PATCH, TRANSDERMAL 24 HOURS TRANSDERMAL EVERY 24 HOURS
Status: DISCONTINUED | OUTPATIENT
Start: 2017-09-09 | End: 2017-09-10 | Stop reason: ALTCHOICE

## 2017-09-09 RX ORDER — ACETAMINOPHEN 325 MG/1
650 TABLET ORAL EVERY 4 HOURS PRN
Status: DISCONTINUED | OUTPATIENT
Start: 2017-09-09 | End: 2017-09-10 | Stop reason: HOSPADM

## 2017-09-09 RX ORDER — LITHIUM CARBONATE 300 MG/1
300 CAPSULE ORAL EVERY MORNING
Status: DISCONTINUED | OUTPATIENT
Start: 2017-09-09 | End: 2017-09-10 | Stop reason: HOSPADM

## 2017-09-09 RX ORDER — ECHINACEA PURPUREA EXTRACT 125 MG
2 TABLET ORAL AS NEEDED
Status: DISCONTINUED | OUTPATIENT
Start: 2017-09-09 | End: 2017-09-10 | Stop reason: HOSPADM

## 2017-09-09 RX ORDER — FLUOXETINE HYDROCHLORIDE 20 MG/1
20 CAPSULE ORAL DAILY
COMMUNITY
End: 2017-09-10 | Stop reason: HOSPADM

## 2017-09-09 RX ORDER — BUPROPION HYDROCHLORIDE 150 MG/1
300 TABLET ORAL EVERY MORNING
Status: DISCONTINUED | OUTPATIENT
Start: 2017-09-09 | End: 2017-09-10 | Stop reason: HOSPADM

## 2017-09-09 RX ORDER — LITHIUM CARBONATE 300 MG/1
600 CAPSULE ORAL NIGHTLY
Status: DISCONTINUED | OUTPATIENT
Start: 2017-09-09 | End: 2017-09-10 | Stop reason: HOSPADM

## 2017-09-09 RX ORDER — FLUOXETINE HYDROCHLORIDE 20 MG/1
20 CAPSULE ORAL DAILY
Status: DISCONTINUED | OUTPATIENT
Start: 2017-09-09 | End: 2017-09-09

## 2017-09-09 RX ORDER — ONDANSETRON 4 MG/1
4-8 TABLET, ORALLY DISINTEGRATING ORAL EVERY 8 HOURS PRN
Status: CANCELLED | OUTPATIENT
Start: 2017-09-09

## 2017-09-09 RX ORDER — LAMOTRIGINE 150 MG/1
150 TABLET ORAL DAILY
Status: ON HOLD | COMMUNITY
End: 2017-09-10

## 2017-09-09 RX ORDER — LOSARTAN POTASSIUM 50 MG/1
100 TABLET ORAL
Status: DISCONTINUED | OUTPATIENT
Start: 2017-09-09 | End: 2017-09-10

## 2017-09-09 RX ORDER — FUROSEMIDE 20 MG/1
10 TABLET ORAL DAILY
Status: DISCONTINUED | OUTPATIENT
Start: 2017-09-09 | End: 2017-09-09

## 2017-09-09 RX ORDER — BENZTROPINE MESYLATE 1 MG/1
1 TABLET ORAL DAILY PRN
Status: DISCONTINUED | OUTPATIENT
Start: 2017-09-09 | End: 2017-09-10 | Stop reason: HOSPADM

## 2017-09-09 RX ORDER — METOPROLOL SUCCINATE 50 MG/1
50 TABLET, EXTENDED RELEASE ORAL DAILY
Status: DISCONTINUED | OUTPATIENT
Start: 2017-09-09 | End: 2017-09-10 | Stop reason: ALTCHOICE

## 2017-09-09 RX ORDER — BENZONATATE 100 MG/1
100 CAPSULE ORAL 3 TIMES DAILY PRN
Status: DISCONTINUED | OUTPATIENT
Start: 2017-09-09 | End: 2017-09-10 | Stop reason: HOSPADM

## 2017-09-09 RX ORDER — BENZTROPINE MESYLATE 1 MG/ML
0.5 INJECTION INTRAMUSCULAR; INTRAVENOUS DAILY PRN
Status: DISCONTINUED | OUTPATIENT
Start: 2017-09-09 | End: 2017-09-10 | Stop reason: HOSPADM

## 2017-09-09 RX ORDER — IBUPROFEN 600 MG/1
600 TABLET ORAL EVERY 6 HOURS PRN
Status: DISCONTINUED | OUTPATIENT
Start: 2017-09-09 | End: 2017-09-10 | Stop reason: HOSPADM

## 2017-09-09 RX ORDER — ALBUTEROL SULFATE 90 UG/1
2 AEROSOL, METERED RESPIRATORY (INHALATION) EVERY 4 HOURS PRN
Status: DISCONTINUED | OUTPATIENT
Start: 2017-09-09 | End: 2017-09-10 | Stop reason: HOSPADM

## 2017-09-09 RX ORDER — ONDANSETRON 4 MG/1
4 TABLET, FILM COATED ORAL EVERY 6 HOURS PRN
Status: DISCONTINUED | OUTPATIENT
Start: 2017-09-09 | End: 2017-09-10 | Stop reason: HOSPADM

## 2017-09-09 RX ORDER — LITHIUM CARBONATE 300 MG/1
300 CAPSULE ORAL EVERY MORNING
COMMUNITY
End: 2017-10-30

## 2017-09-09 RX ORDER — LOPERAMIDE HYDROCHLORIDE 2 MG/1
2 CAPSULE ORAL 4 TIMES DAILY PRN
Status: DISCONTINUED | OUTPATIENT
Start: 2017-09-09 | End: 2017-09-10 | Stop reason: HOSPADM

## 2017-09-09 RX ORDER — LAMOTRIGINE 100 MG/1
150 TABLET ORAL EVERY 12 HOURS SCHEDULED
Status: DISCONTINUED | OUTPATIENT
Start: 2017-09-10 | End: 2017-09-10 | Stop reason: HOSPADM

## 2017-09-09 RX ADMIN — BUPROPION HYDROCHLORIDE 300 MG: 150 TABLET, FILM COATED, EXTENDED RELEASE ORAL at 15:16

## 2017-09-09 RX ADMIN — LOSARTAN POTASSIUM 100 MG: 50 TABLET, FILM COATED ORAL at 15:17

## 2017-09-09 RX ADMIN — LITHIUM CARBONATE 600 MG: 300 CAPSULE, GELATIN COATED ORAL at 21:41

## 2017-09-09 RX ADMIN — NICOTINE 1 PATCH: 21 PATCH TRANSDERMAL at 15:17

## 2017-09-09 RX ADMIN — METOPROLOL SUCCINATE 50 MG: 50 TABLET, FILM COATED, EXTENDED RELEASE ORAL at 15:17

## 2017-09-09 RX ADMIN — LAMOTRIGINE 150 MG: 100 TABLET ORAL at 15:15

## 2017-09-09 RX ADMIN — LITHIUM CARBONATE 300 MG: 300 CAPSULE, GELATIN COATED ORAL at 15:16

## 2017-09-09 NOTE — CONSULTS
"1200 - [id number]    DATA:  Behavioral Health Navigator received request for behavioral health consult from [facility] ED staff, MD Long Torres.  Navigator met with patient at bedside.  Patient presents to ED for psychiatric evaluation.  Patient states that for x1 week she has been experiencing suicidal thoughts.  The patient reports tonight they have been escalating to the point of commanding thoughts.  She reports her thoughts are \"just do it, just fucking do it and do it right.\"  She reports the thoughts are too uncomfortable to manage safely at home, she is concerned for her safety or intent of harming herself.  Her plan is unspecific, although she states there are \"multiple ways.\"  She presents alert and oriented, although she appears to experiencing thought blocking, as she looses thoughts mid sentence and appears to stare off into the wall.  Her affect is anxious, depressed, and somewhat paranoid.  She is restless and stand by the door during the assessment, but is cooperative with redirection.  She does not appear to be an elopment risk, but rather as restless and anxious and uneasy.  She states she is in recovery with a sobriety date of 04/22/15.  Patient currently lives in a alf home and did have a fulltime job through [employer], although states she recently lost this job opportunity and it may be a trigger.  She states she has a long hx of mental health tx, but denies any psychiatric admission for over 3 years.  Patient reports she sees a therapist, Mar Mireles, whom she has great rapport with and she is seen biweekly with her last apt being Thurs 09/07.  Patient PCP is Dr. Pardo.  Patient presents with her Sister, and wants her sister involved in her tx.  Patient reports rapid mood cycling from \"minute to minute\" varying between anxious and then \"hellish suicidal ideation.\"  Patient is currently denying any VH/AH and denying HI.      ASSESSMENT:  Upon assessment, patient is alert and oriented x4.  " "Patient is denying VH/AH and does not appear to be experiencing any perceptual disturbances, although it is noted she appears to thought block and stare flat into space between thoughts.  She denies commanding AH but does state her thoughts are commanding to kill herself, \"Just do it, just fucking do it.\"  Patient appears to be slightly paranoid, anxious, and depressed.   Patient affect is anxious and restless.  Patient is actively endorsing suicidal thoughts. Navigator administered CSSRS for suicide risk assessment.  The results of patient’s CSSRS suggest that patient is having commanding thoughts to harm herself, and she is unable to contract for her own safety, feels unsafe of her own actions. Her Utox is negative for all substances, and the patient is in recovery and sober since 04/22/15.  Patient appears to be actively engaging in outpatient services but reports she and her therapist both agreed for a psychiatric evaluation for inpatient services based upon her commanding thoughts.  Patient reports to be agreeable for tx.      PLAN:  At this time, this Navigator recommends inpatient treatment based upon the above indicators.  Patient reports to be agreeable for tx.  Navigator informed [facility] ED staff, MD Long Torres who are agreeable to plan.  Navigator faxed appropriate paperwork to [facility].  Patient was accepted by [facility] MD Aguilar as communicated by Lead RN, Latonia.  Navigator contacted [facility] for transportation.  Patient to transfer to [facility] upon [facility] arrival.  All parties aware and agreeable.  Patient is resting and in the company of her sister, awaiting [facility] transport.    -DEENA Young.    "

## 2017-09-09 NOTE — PLAN OF CARE
Problem:  Patient Care Overview (Adult)  Goal: Plan of Care Review  Outcome: Ongoing (interventions implemented as appropriate)    09/09/17 1154   Coping/Psychosocial Response Interventions   Plan Of Care Reviewed With patient   Coping/Psychosocial   Patient Agreement with Plan of Care agrees   Patient Care Overview   Consent Given to Review Plan with Sister Destini Lindsey    Progress progress toward functional goals is gradual   Outcome Evaluation   Outcome Summary/Follow up Plan Therapist will offer 1-4 individual, family education, after care planning.              Goal: Interdisciplinary Rounds/Family Conference  Outcome: Ongoing (interventions implemented as appropriate)    09/09/17 1154   Interdisciplinary Rounds/Family Conf   Summary Treatment team staffing and evaluation.    Participants psychiatrist;social work;patient;nursing;family         Therapist will attempt to staff with treatment team and family as needed.          Goal: Individualization and Mutuality  Outcome: Ongoing (interventions implemented as appropriate)    09/09/17 1132 09/09/17 1154   Behavioral Health Screens   Patient Personal Strengths coping skills;expressive of needs;compliant with treatment/medications;motivated for recovery;motivated for treatment;family/social support;intellectual cognitive skills;realistic evaluation of current/future capabilities;resourceful;resilient;expressive of emotions;ability to maintain sobriety  (Has been sober since 2015. ) --    Patient Personal Strengths Comment --  Willing to seek help, sober living support.    Patient Vulnerabilities Ineffective coping, depression/anxiety.  --    Individualization   Patient Specific Preferences --  None    Patient Specific Goals --  Medication adjustment, help with depression    Patient Specific Interventions --  Therapist will offer 1-4 individual, family education, aftercare focusing on    Mutuality/Individual Preferences   What Anxieties, Fears or Concerns Do You  Have About Your Health or Care? --  That I'm stuck in terms of medicine that I can take    What Questions Do You Have About Your Health or Care? --  None    What Information Would Help Us Give You More Personalized Care? --  None              Goal: Discharge Needs Assessment  Outcome: Ongoing (interventions implemented as appropriate)    09/09/17 1154   Discharge Needs Assessment   Concerns To Be Addressed mental health concerns;suicidal concerns;coping/stress concerns   Readmission Within The Last 30 Days no previous admission in last 30 days   Community Agency Name(S) [facility] for medication management    Current Discharge Risk psychiatric illness   Discharge Planning Comments [facility]. She is refusing psychiatric follow up and plans to continue with [facility].   Discharge Needs Assessment   Outpatient/Agency/Support Group Needs outpatient psychiatric care (specify);outpatient medication management;outpatient counseling;outpatient substance abuse treatment (specify);residential services   Anticipated Discharge Disposition home or self-care   Discharge Disposition home or self-care;other (see comments)  ([facility])   Living Environment   Transportation Available public transportation         [id number]:         DATA:         Therapist met individually with patient this date to introduce role and to discuss hospitalization expectations. Patient agreeable.      Therapist completed integrated summary, treatment plan, and initiated social history this date.  Therapist is strongly recommending a family session prior to discharge.  Therapist gained consent to speak with patient's sister Destini Lindsey at [phone].  Contacted Destini this date who appeared very supportive. She identified that the patient has had hospitalizations through out her life.  She reports that the patient has  "been more depressed over the last week and \"feeling though she can't get ahead in life.\"  She reports that has been diagnosed Bipolar in the past.  Destini is supportive of patient returning to [facility] and would like to transport her home if possible.          ASSESSMENT:      Ms. Nicole Altman is a 37 year old , single, employed female.  Patient resides in sober living at [facility] and reports being sober since April 2015.  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 Patient required admission due to suicdial ideation.  Patient reports feeling like she is walking through mollasses.  She reports that she is on Lithium and can not take antidepressants due to them making her \"full blown manic.\" Patient reports history of becoming manic with episodes of being grandiose and delusional.  Patient reports history of family strain due to being \"raised by the state and institutions.\"  Patient has had many psychiatric hospitalizations including [facility].  She reports peer support position has been too stressful and that she recently had to reduce time to part time.  This has made her have some financial strain at home.       Today, patient reports that she feels as though she is withdrawn and walking through \"mollasses.\"  She reports that she has been able to maintain sobriety, but has constant negative thoughts.  \"Thoughts just run into each other and then next thing you are thinking that you might as well just kill yourself.\" Overall, patient is minimally verbal and requires motivation to elaborate on history.       She reports that she plans to follow up with her therapist Mar at [facility].  She also attend [facility] in [facility].       PLAN:       Treatment team will focus efforts on stabilizing patient's acute symptoms while providing education on healthy coping and crisis management to reduce hospitalizations.   Patient requires daily psychiatrist evaluation and " 24/7 nursing supervision to promote patient  safety.     Therapist will offer 1-4 individual sessions (20-30 minutes each), 1 therapy group daily, family education, and appropriate referral.     Therapist recommends patient continue with [facility] and sober living support.  Patient is refusing need for outpatient psychiatric referral and wants to continue with [facility].  She has scheduled appointment on 9/14.   Patient's sister reports that she should be able to transport her back to [facility] at discharge.

## 2017-09-09 NOTE — PLAN OF CARE
Problem: BH Patient Care Overview (Adult)  Goal: Plan of Care Review  Outcome: Ongoing (interventions implemented as appropriate)  Pt. Verbalizes doing program at [facility] but couldn't take it any longer and came here has been out in day room through out day minimal interaction noted with peers co's anx. /dep     09/09/17 1629   Coping/Psychosocial Response Interventions   Plan Of Care Reviewed With patient   Coping/Psychosocial   Patient Agreement with Plan of Care agrees   Patient Care Overview   Progress improving         Problem:  Overarching Goals  Goal: Adheres to Safety Considerations for Self and Others  Outcome: Ongoing (interventions implemented as appropriate)  Goal: Optimized Coping Skills in Response to Life Stressors  Outcome: Ongoing (interventions implemented as appropriate)  Goal: Develops/Participates in Therapeutic [place] to Support Successful Transition  Outcome: Ongoing (interventions implemented as appropriate)
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
## 2017-09-10 VITALS
HEART RATE: 62 BPM | SYSTOLIC BLOOD PRESSURE: 118 MMHG | RESPIRATION RATE: 18 BRPM | HEIGHT: 67 IN | DIASTOLIC BLOOD PRESSURE: 69 MMHG | OXYGEN SATURATION: 98 % | TEMPERATURE: 98 F | WEIGHT: 236 LBS | BODY MASS INDEX: 37.04 KG/M2

## 2017-09-10 LAB
BACTERIA SPEC AEROBE CULT: NO GROWTH
LITHIUM SERPL-SCNC: 0.7 MMOL/L (ref 0.6–1.2)
LITHIUM SERPL-SCNC: 1 MMOL/L (ref 0.6–1.2)

## 2017-09-10 PROCEDURE — 99238 HOSP IP/OBS DSCHRG MGMT 30/<: CPT | Performed by: PSYCHIATRY & NEUROLOGY

## 2017-09-10 PROCEDURE — 80178 ASSAY OF LITHIUM: CPT | Performed by: PSYCHIATRY & NEUROLOGY

## 2017-09-10 RX ORDER — LAMOTRIGINE 150 MG/1
150 TABLET ORAL 2 TIMES DAILY
Start: 2017-09-10 | End: 2017-10-10 | Stop reason: SDUPTHER

## 2017-09-10 RX ORDER — LOSARTAN POTASSIUM 50 MG/1
100 TABLET ORAL NIGHTLY
Status: DISCONTINUED | OUTPATIENT
Start: 2017-09-10 | End: 2017-09-10 | Stop reason: HOSPADM

## 2017-09-10 RX ORDER — METOPROLOL SUCCINATE 50 MG/1
50 TABLET, EXTENDED RELEASE ORAL NIGHTLY
Status: DISCONTINUED | OUTPATIENT
Start: 2017-09-10 | End: 2017-09-10 | Stop reason: HOSPADM

## 2017-09-10 RX ORDER — NICOTINE 21 MG/24HR
1 PATCH, TRANSDERMAL 24 HOURS TRANSDERMAL DAILY
Status: DISCONTINUED | OUTPATIENT
Start: 2017-09-10 | End: 2017-09-10 | Stop reason: HOSPADM

## 2017-09-10 RX ADMIN — LAMOTRIGINE 150 MG: 100 TABLET ORAL at 09:12

## 2017-09-10 RX ADMIN — NICOTINE 1 PATCH: 21 PATCH TRANSDERMAL at 09:57

## 2017-09-10 RX ADMIN — BUPROPION HYDROCHLORIDE 300 MG: 150 TABLET, FILM COATED, EXTENDED RELEASE ORAL at 06:11

## 2017-09-10 RX ADMIN — LITHIUM CARBONATE 300 MG: 300 CAPSULE, GELATIN COATED ORAL at 06:11

## 2017-09-10 NOTE — PLAN OF CARE
"Problem:  Patient Care Overview (Adult)  Goal: Plan of Care Review  Outcome: Ongoing (interventions implemented as appropriate)  Rated anxiety and depression as 6.  Still feels suicidal, but with no plan. States: \"I just think what's the use?\" Guarded and irritable. Out of room during evening and was relatively calm working on word search puzzles. Then she had some difficulty going to sleep and began to pace rapidly around the day room for approximately an hour, running her fingers through her hair, looking very frustrated. Refused to talk to staff and when offered something for sleep she adamantly stated: \"NO!\" Finally she was able to go to bed around 0100 and has slept since.    09/10/17 0532   Coping/Psychosocial Response Interventions   Plan Of Care Reviewed With patient   Coping/Psychosocial   Patient Agreement with Plan of Care agrees   Patient Care Overview   Progress no change         Problem:  Overarching Goals  Goal: Adheres to Safety Considerations for Self and Others  Outcome: Ongoing (interventions implemented as appropriate)  Goal: Optimized Coping Skills in Response to Life Stressors  Outcome: Ongoing (interventions implemented as appropriate)  Goal: Develops/Participates in Therapeutic [place] to Support Successful Transition  Outcome: Ongoing (interventions implemented as appropriate)      "

## 2017-09-10 NOTE — DISCHARGE SUMMARY
"      PSYCHIATRIC DISCHARGE SUMMARY     Patient Identification:  Name:  Nicole Altman  Age:  37 y.o.  Sex:  female  :  1980  MRN:  [id number]  Visit Number:  [id number]      Date of Admission:2017   Date of Discharge:  9/10/2017    Discharge Diagnosis:  Active Problems:    Bipolar disorder, mixed        Admission Diagnosis:  MDD (major depressive disorder) [F32.9]     Hospital Course  Patient is a 37 y.o. female presented with depression and suicidal ideations and was admitted to the [facility] unit for safety, further evaluation and treatment.  Her medications were reviewed and she reported that she was no longer taking Prozac as it made her more irritable and she was taking a higher dose of Lamictal. The patient was of the opinion that her current medication regimen was helpful and she didn't want any changes to be made. She states that she was resisting to come to the hospital as in the past she has had frequent admissions to the psych units and reported history of mood swings and being in restraints. The patient did well during her hospitalization and reported no suicidal thoughts and expressed her desire to be discharged. She reported that she had a good support network and she felt safe being discharged.      Mental Status Exam upon discharge:   Mood \"good\"   Affect-congruent, appropriate, stable  Thought Content-goal directed, no delusional material present  Thought process-linear, organized.  Suicidality: No SI  Homicidality: No HI  Perception: No AH/VH    Procedures Performed         Consults:   Consults     No orders found for last 30 day(s).          Pertinent Test Results: UDS negative, Li level 1.0, CBC and CMP were unremarkable    Condition on Discharge:  improved    Vital Signs  Temp:  [97.4 °F (36.3 °C)-98 °F (36.7 °C)] 98 °F (36.7 °C)  Heart Rate:  [62-68] 62  Resp:  [16-18] 18  BP: (118-138)/(69-91) 118/69      Discharge Disposition:  Home or Self Care    Discharge " Medications:   Nicole Altman   Home Medication Instructions WILLIAM:[id number]    Printed on:09/10/17 3801   Medication Information                      albuterol (PROVENTIL HFA;VENTOLIN HFA) 108 (90 BASE) MCG/ACT inhaler  Inhale 2 puffs Every 4 (Four) Hours As Needed for wheezing or shortness of air.             buPROPion XL (WELLBUTRIN XL) 300 MG 24 hr tablet  Take 1 tablet by mouth Every Morning.             irbesartan (AVAPRO) 300 MG tablet  Take 1 tablet by mouth Daily.             lamoTRIgine (LaMICtal) 150 MG tablet  Take 1 tablet by mouth 2 (Two) Times a Day.             lithium carbonate 300 MG capsule  Take 300 mg by mouth Every Morning.             lithium carbonate 300 MG capsule  Take 600 mg by mouth Every Night.             metoprolol succinate XL (TOPROL-XL) 50 MG 24 hr tablet  Take 50 mg by mouth Daily.             ondansetron ODT (ZOFRAN ODT) 8 MG disintegrating tablet  Dissolve 1/2 to 1 tablet SL every 8 hours as needed for nausea.                 Discharge Diet: Regular    Activity at Discharge: As tolerated    Follow-up Appointments  Future Appointments  Date Time Provider Department Center   9/14/2017 4:45 PM PATRICIA Geiger [facility] None   11/7/2017 9:30 AM YOANA [facility] None         Test Results Pending at Discharge      Clinician:   Divine Aguilar MD  09/10/17  1:34 PM

## 2017-09-14 ENCOUNTER — OFFICE VISIT (OUTPATIENT)
Dept: INTERNAL MEDICINE | Facility: CLINIC | Age: 37
End: 2017-09-14

## 2017-09-14 VITALS
BODY MASS INDEX: 36.88 KG/M2 | TEMPERATURE: 98.6 F | OXYGEN SATURATION: 98 % | SYSTOLIC BLOOD PRESSURE: 128 MMHG | HEART RATE: 77 BPM | WEIGHT: 235 LBS | HEIGHT: 67 IN | DIASTOLIC BLOOD PRESSURE: 76 MMHG

## 2017-09-14 DIAGNOSIS — R45.851 SUICIDAL IDEATION: ICD-10-CM

## 2017-09-14 DIAGNOSIS — F31.60 BIPOLAR DISORDER, MIXED (HCC): Primary | ICD-10-CM

## 2017-09-14 DIAGNOSIS — Z79.899 LITHIUM USE: ICD-10-CM

## 2017-09-14 PROCEDURE — 99214 OFFICE O/P EST MOD 30 MIN: CPT | Performed by: PHYSICIAN ASSISTANT

## 2017-09-14 NOTE — PROGRESS NOTES
Nicole Altman is a 37 y.o. female.     Subjective   History of Present Illness   Here today for follow up from ED visit on 9/9 where she asked to be evaluated for suicidal ideation.  She was sent to [facility] where she checked herself in for treatment for 2 nights. She reported that they came to an agreement that she was ready to leave.  She reports that she stopped taking Prozac about a month ago when she (and others arounf her) recognized hypomanic symptoms and ever since then she feels she has spiraled downward in mood and desire to live.  She reports continued suicidal ideation with intermittent periods of feeling ok. She did not feel [facility] would be very helpful for her because they were unable to provide any care she would benefit from.  Yesterday she quit her job due after a few months of frustrations.  She reports she has a good support system around her which is helpful. Sees a therapist where she lives with the last visit being last week.         The following portions of the patient's history were reviewed and updated as appropriate: allergies, current medications, past family history, past medical history, past social history, past surgical history and problem list.    Review of Systems    Constitutional: Negative for appetite change, chills, fatigue, fever and unexpected weight change.   HENT: Negative for congestion, ear pain, hearing loss, nosebleeds, postnasal drip, rhinorrhea, sore throat, tinnitus and trouble swallowing.    Eyes: Negative for photophobia, discharge and visual disturbance.   Respiratory: Negative for cough, chest tightness, shortness of breath and wheezing.    Cardiovascular: Negative for chest pain, palpitations and leg swelling.   Gastrointestinal: Negative for abdominal distention, abdominal pain, blood in stool, constipation, diarrhea, nausea and vomiting.   Endocrine: Negative for cold intolerance, heat intolerance, polydipsia, polyphagia and polyuria.   Musculoskeletal:  "Negative for arthralgias, back pain, joint swelling, myalgias, neck pain and neck stiffness.   Skin: Negative for color change, pallor, rash and wound.   Allergic/Immunologic: Negative for environmental allergies, food allergies and immunocompromised state.   Neurological: Negative for dizziness, tremors, seizures, weakness, numbness and headaches.   Hematological: Negative for adenopathy. Does not bruise/bleed easily.   Psychiatric/Behavioral: suicidal ideation, anxiety, dysphoric mood. Negative for sleep disturbances, agitation, behavioral problems, confusion, hallucinations.    Objective    Physical Exam  Constitutional: Oriented to person, place, and time. Appears well-developed and well-nourished.   HENT:   Head: Normocephalic and atraumatic.   Eyes: EOM are normal. Pupils are equal, round, and reactive to light.   Neck: Normal range of motion. Neck supple.   Cardiovascular: Normal rate, regular rhythm and normal heart sounds.    Pulmonary/Chest: Effort normal and breath sounds normal. No respiratory distress.  Has no wheezes or rales. Exhibits no chest wall tenderness.   Abdominal: Soft. Bowel sounds are normal. Exhibits no distension and no mass. There is no tenderness.   Musculoskeletal: Normal range of motion. Exhibits no tenderness.   Neurological: Alert and oriented to person, place, and time.   Skin: Skin is warm and dry.   Psychiatric: Has a dysphoric mood and affect. Behavior is normal. Judgment and thought content normal.         /76  Pulse 77  Temp 98.6 °F (37 °C)  Ht 67\" (170.2 cm)  Wt 235 lb (107 kg)  LMP 08/27/2017  SpO2 98%  BMI 36.81 kg/m2    Nursing note and vitals reviewed.      Assessment/Plan   Nicole was seen today for hypertension and follow-up.    Diagnoses and all orders for this visit:    Bipolar disorder, mixed  -     Ambulatory Referral to Psychiatry    [place] use  -     Ambulatory Referral to Psychiatry    Suicidal ideation  -     Ambulatory Referral to " Psychiatry    She refused labs to check thyroid and lithium level today and said we can do them at her next follow up. She refused to try the addition of an antipsychotic due to having tried all of them in the past which caused weight gain and excessive sleeping. She was unreceptive to admission at [facility] and declined readmission to [facility].     Advised her to go to the ER with any worsened or persistent suicidal ideation.

## 2017-09-29 DIAGNOSIS — Z01.419 ENCOUNTER FOR GYNECOLOGICAL EXAMINATION: Primary | ICD-10-CM

## 2017-10-10 DIAGNOSIS — F32.81 PREMENSTRUAL DYSPHORIC DISORDER: ICD-10-CM

## 2017-10-11 RX ORDER — LAMOTRIGINE 150 MG/1
TABLET ORAL
Qty: 60 TABLET | Refills: 1 | Status: SHIPPED | OUTPATIENT
Start: 2017-10-11 | End: 2017-12-27 | Stop reason: SDUPTHER

## 2017-10-11 RX ORDER — FLUOXETINE HYDROCHLORIDE 20 MG/1
CAPSULE ORAL
Qty: 30 CAPSULE | Refills: 0 | Status: SHIPPED | OUTPATIENT
Start: 2017-10-11 | End: 2017-11-07

## 2017-10-28 DIAGNOSIS — F31.60 BIPOLAR DISORDER, MIXED (HCC): ICD-10-CM

## 2017-10-30 RX ORDER — LITHIUM CARBONATE 300 MG/1
CAPSULE ORAL
Qty: 90 CAPSULE | Refills: 0 | OUTPATIENT
Start: 2017-10-30

## 2017-10-30 RX ORDER — LITHIUM CARBONATE 300 MG/1
CAPSULE ORAL
Qty: 90 CAPSULE | Refills: 5 | Status: SHIPPED | OUTPATIENT
Start: 2017-10-30 | End: 2018-04-28 | Stop reason: SDUPTHER

## 2017-10-31 ENCOUNTER — PATIENT MESSAGE (OUTPATIENT)
Dept: INTERNAL MEDICINE | Facility: CLINIC | Age: 37
End: 2017-10-31

## 2017-10-31 DIAGNOSIS — J40 BRONCHITIS: ICD-10-CM

## 2017-10-31 RX ORDER — ALBUTEROL SULFATE 90 UG/1
2 AEROSOL, METERED RESPIRATORY (INHALATION) EVERY 4 HOURS PRN
Qty: 1 INHALER | Refills: 11 | Status: SHIPPED | OUTPATIENT
Start: 2017-10-31 | End: 2020-03-05 | Stop reason: SDUPTHER

## 2017-10-31 NOTE — TELEPHONE ENCOUNTER
From: Nicole Altman  To: PATRICIA Geiger  Sent: 10/31/2017 9:13 AM EDT  Subject: Prescription Question    Could you please call in a refill for a ventolin inhaler (the blue one)?    Winter is here. Asthma is back. :/    The lithium was correct, thank you. :)

## 2017-11-07 ENCOUNTER — PROCEDURE VISIT (OUTPATIENT)
Dept: NEUROLOGY | Facility: CLINIC | Age: 37
End: 2017-11-07

## 2017-11-07 VITALS
DIASTOLIC BLOOD PRESSURE: 82 MMHG | HEART RATE: 83 BPM | BODY MASS INDEX: 39.24 KG/M2 | HEIGHT: 67 IN | OXYGEN SATURATION: 98 % | SYSTOLIC BLOOD PRESSURE: 120 MMHG | WEIGHT: 250 LBS

## 2017-11-07 DIAGNOSIS — G43.019 INTRACTABLE MIGRAINE WITHOUT AURA AND WITHOUT STATUS MIGRAINOSUS: Primary | ICD-10-CM

## 2017-11-07 PROCEDURE — 20553 NJX 1/MLT TRIGGER POINTS 3/>: CPT | Performed by: NURSE PRACTITIONER

## 2017-11-07 RX ORDER — BUPIVACAINE HYDROCHLORIDE 5 MG/ML
5 INJECTION, SOLUTION PERINEURAL ONCE
Status: COMPLETED | OUTPATIENT
Start: 2017-11-07 | End: 2017-11-07

## 2017-11-07 RX ORDER — METHYLPREDNISOLONE ACETATE 40 MG/ML
200 INJECTION, SUSPENSION INTRA-ARTICULAR; INTRALESIONAL; INTRAMUSCULAR; SOFT TISSUE ONCE
Status: COMPLETED | OUTPATIENT
Start: 2017-11-07 | End: 2017-11-07

## 2017-11-07 RX ADMIN — BUPIVACAINE HYDROCHLORIDE 5 ML: 5 INJECTION, SOLUTION PERINEURAL at 14:35

## 2017-11-07 RX ADMIN — METHYLPREDNISOLONE ACETATE 200 MG: 40 INJECTION, SUSPENSION INTRA-ARTICULAR; INTRALESIONAL; INTRAMUSCULAR; SOFT TISSUE at 14:35

## 2017-11-07 NOTE — PROGRESS NOTES
Patient is suffering from headache and myofacial pain syndrome.  Patient states that she has been headache free since her last trigger point injection patient very pleased with trigger point injections at this time.  Risks and benefits of the Trigger point injection procedure have been explained to the patient.  Patient has no contraindications such as bleed diathesis, recent acute trauma at the muscle sites, anesthetic allergy or anticoagulation.  Mechanism of trigger point injection has been explained to patient.     Procedure Note:  1.         Patient was positioned comfortably  2.         Before injections are started, 10 Trigger Points sites are identified throughout the bilateral upper trapezius muscle, levator scapulae, and erector spinae muscles.    3.         Overlying skin area was cleaned with Alcohol swab                                                                                                              4.         Before injection, trigger points sites were palpated for local twitch and referred pain to confirms placement                         5.         Local anesthetic was mixed with Depo-Medrol (5 cc of Marcaine 0.5% mixed with 5 cc of Depo-Medrol at 40 mg/ml - for a total of 10 cc)  6.         30 gauge ½ inch needle was utilized to ensure patient comfort          7.         I started with the most tender spot in above mentioned Trigger Points   1.   Localize most tender spot within taut muscle-fibers  2.   Fix tender spot between fingers (1-2 cm in size)   1.   Prevents from rolling away from needle  2.   Controls subcutaneous bleeding  3.   Before injection, patient was instructed of possible pain on injection  4.   Direct needle at 30 degree angle off skin   8.         Insert needle into skin 1-2 cm from Trigger Point   9.         Advance needle into Trigger Point   10.       Use 1cc anesthetic at each Trigger Points identified in step #2  11.       Redirect needle and reinject                                                                                               1.   Withdraw needle to subcutaneous tissue  2.   Redirect needle into adjacent tender areas  3.   Repeat until local twitch or tautness resolves  12.   After each of the 10 injections I held direct pressure at injection site for 2 minutes to prevents hematoma formation  13.   The same procedure was repeated for other Tender Points located in the upper trapezius muscle, levator scapulae and erector spinae bilaterally  14.   Patient was instructed to gently stretch injected areas to full active range of motion in all directions and was instructed to repeat range of motion three times after injection  15.   Post-Procedure patient was instructed to avoid over-using injected area for 3-4 days   1.   Maintain active range of motion of injected muscle  2.   Patient applies ice to injected areas for a few hours  3.   Anticipate post-injection soreness for 3-4 days  There was no evidence of complications from injection was noted such as local Skin Infection  at injection site or local hematoma at injection site

## 2017-11-25 DIAGNOSIS — F31.60 BIPOLAR DISORDER, MIXED (HCC): ICD-10-CM

## 2017-11-27 ENCOUNTER — TELEPHONE (OUTPATIENT)
Dept: INTERNAL MEDICINE | Facility: CLINIC | Age: 37
End: 2017-11-27

## 2017-11-27 DIAGNOSIS — F31.60 BIPOLAR DISORDER, MIXED (HCC): ICD-10-CM

## 2017-11-27 RX ORDER — BUPROPION HYDROCHLORIDE 300 MG/1
TABLET ORAL
Qty: 30 TABLET | Refills: 5 | Status: SHIPPED | OUTPATIENT
Start: 2017-11-27 | End: 2017-12-29

## 2017-11-27 RX ORDER — BUPROPION HYDROCHLORIDE 300 MG/1
300 TABLET ORAL EVERY MORNING
Qty: 90 TABLET | Refills: 1 | Status: SHIPPED | OUTPATIENT
Start: 2017-11-27 | End: 2018-05-21 | Stop reason: ALTCHOICE

## 2017-11-27 NOTE — TELEPHONE ENCOUNTER
----- Message from Levi Altman sent at 11/25/2017  3:52 PM EST -----  Regarding: Prescription Question  Contact: [phone]  Hello.     Hope y'all had a good thanksgiving.     Could you please call in a refill for Wellbutrin xl 300mg?    I would greatly appreciate it as would everyone around me. ;)    Please & Thanks,  Levi Altman

## 2017-12-20 ENCOUNTER — TELEPHONE (OUTPATIENT)
Dept: NEUROLOGY | Facility: CLINIC | Age: 37
End: 2017-12-20

## 2017-12-20 DIAGNOSIS — F41.9 ANXIETY: Primary | ICD-10-CM

## 2017-12-20 DIAGNOSIS — F34.1 DYSTHYMIA: ICD-10-CM

## 2017-12-20 NOTE — TELEPHONE ENCOUNTER
----- Message from Levi Altman sent at 12/14/2017  2:08 PM EST -----  Regarding: Referral Request  Contact: [phone]  [facility].     You had mentioned a doctor to assist with/ diagnosis autism? May I have a referral?    Please and thanks,  Levi Altman

## 2017-12-27 RX ORDER — LAMOTRIGINE 150 MG/1
TABLET ORAL
Qty: 60 TABLET | Refills: 1 | Status: SHIPPED | OUTPATIENT
Start: 2017-12-27 | End: 2018-02-28 | Stop reason: SDUPTHER

## 2017-12-29 ENCOUNTER — OFFICE VISIT (OUTPATIENT)
Dept: INTERNAL MEDICINE | Facility: CLINIC | Age: 37
End: 2017-12-29

## 2017-12-29 VITALS
WEIGHT: 245 LBS | HEART RATE: 87 BPM | SYSTOLIC BLOOD PRESSURE: 130 MMHG | HEIGHT: 67 IN | TEMPERATURE: 98.8 F | DIASTOLIC BLOOD PRESSURE: 80 MMHG | BODY MASS INDEX: 38.45 KG/M2 | OXYGEN SATURATION: 98 %

## 2017-12-29 DIAGNOSIS — F31.9 BIPOLAR DEPRESSION (HCC): Primary | ICD-10-CM

## 2017-12-29 PROCEDURE — 99213 OFFICE O/P EST LOW 20 MIN: CPT | Performed by: PHYSICIAN ASSISTANT

## 2017-12-29 RX ORDER — ALBUTEROL SULFATE 2.5 MG/3ML
2.5 SOLUTION RESPIRATORY (INHALATION) EVERY 4 HOURS PRN
Qty: 30 VIAL | Refills: 12 | Status: SHIPPED | OUTPATIENT
Start: 2017-12-29 | End: 2020-07-16

## 2017-12-29 NOTE — PROGRESS NOTES
Nicole Altman is a 37 y.o. female.     Subjective   History of Present Illness   Patient with history of bipolar disorder here today with concern of severe depression. The week of her last menses was particularly bad since it was [place], she was house sitting and alone as well as losing a friend the same week. She feels she needs an antidepressant due to lack of interest in doing things and constant dysphoric mood. She reports constant suicidal ideation which is much worse during her menses. She has made suicide attempts in the past via intentional overdosing. She has tried and failed many medications in the past for various reasons. Most recently she failed Prozac due to inducing hypomania after dose increase from 10 to 20 mg daily, despite it working well at the lower dose.  She has been on Wellbutrin which does not help her at all and she would like to discontinue. She takes Lithium and Lamictal as directed. She sees a counselor regularly. She refuses to use antipsychotics due to weight gain and somnolence.         The following portions of the patient's history were reviewed and updated as appropriate: allergies, current medications, past family history, past medical history, past social history, past surgical history and problem list.    Review of Systems    Constitutional: Negative for appetite change, chills, fatigue, fever and unexpected weight change.   HENT: Negative for congestion, ear pain, hearing loss, nosebleeds, postnasal drip, rhinorrhea, sore throat, tinnitus and trouble swallowing.    Eyes: Negative for photophobia, discharge and visual disturbance.   Respiratory: Negative for cough, chest tightness, shortness of breath and wheezing.    Cardiovascular: Negative for chest pain, palpitations and leg swelling.   Gastrointestinal: Negative for abdominal distention, abdominal pain, blood in stool, constipation, diarrhea, nausea and vomiting.   Endocrine: Negative for cold intolerance, heat  "intolerance, polydipsia, polyphagia and polyuria.   Musculoskeletal: Negative for arthralgias, back pain, joint swelling, myalgias, neck pain and neck stiffness.   Skin: Negative for color change, pallor, rash and wound.   Allergic/Immunologic: Negative for environmental allergies, food allergies and immunocompromised state.   Neurological: Negative for dizziness, tremors, seizures, weakness, numbness and headaches.   Hematological: Negative for adenopathy. Does not bruise/bleed easily.   Psychiatric/Behavioral: dysphoric mood, suicidal ideation. Negative for sleep disturbances, agitation, behavioral problems, confusion, hallucinations, self-injury.The patient is not nervous/anxious.      Objective    Physical Exam  Constitutional: Oriented to person, place, and time. Appears well-developed and well-nourished.   HENT:   Head: Normocephalic and atraumatic.   Eyes: EOM are normal. Pupils are equal, round, and reactive to light.   Neck: Normal range of motion. Neck supple.   Cardiovascular: Normal rate, regular rhythm and normal heart sounds.    Pulmonary/Chest: Effort normal and breath sounds normal. No respiratory distress.  Has no wheezes or rales. Exhibits no chest wall tenderness.   Abdominal: Soft. Bowel sounds are normal. Exhibits no distension and no mass. There is no tenderness.   Musculoskeletal: Normal range of motion. Exhibits no tenderness.   Neurological: Alert and oriented to person, place, and time.   Skin: Skin is warm and dry.   Psychiatric: Dysphoric mood with congruent affect. Behavior is normal. Judgment and thought content normal.       /80  Pulse 87  Temp 98.8 °F (37.1 °C)  Ht 170.2 cm (67.01\")  Wt 111 kg (245 lb)  SpO2 98%  BMI 38.36 kg/m2    Nursing note and vitals reviewed.        Assessment/Plan   Nicole was seen today for follow-up.    Diagnoses and all orders for this visit:    Bipolar depression  -     Vortioxetine HBr (TRINTELLIX) 5 MG tablet; Take 5 mg by mouth Daily With " Breakfast.    Other orders  -     albuterol (PROVENTIL) (2.5 MG/3ML) 0.083% nebulizer solution; Take 2.5 mg by nebulization Every 4 (Four) Hours As Needed for Wheezing.    Will attempt 1 month trial of the lowest dose of Trintellix.  F/u 1 month.   Also plan to discuss her situation with Dr. Mendoza for additional input.     Previous failed medications include but may not be limited to:  Effexor- numb feeling  Prozac- hypomania  Paxil- numb feeling  Cymbalta- dizziness  Wellbutrin- no efficacy  Trazodone- fatigue  Zoloft- numb feeling  Celexa- hypomania  Abilify- hypomania with lack of efficacy  Elavil- fatigue

## 2018-01-19 ENCOUNTER — OFFICE VISIT (OUTPATIENT)
Dept: OBSTETRICS AND GYNECOLOGY | Facility: CLINIC | Age: 38
End: 2018-01-19

## 2018-01-19 VITALS
HEIGHT: 67 IN | SYSTOLIC BLOOD PRESSURE: 146 MMHG | BODY MASS INDEX: 40.18 KG/M2 | DIASTOLIC BLOOD PRESSURE: 88 MMHG | WEIGHT: 256 LBS

## 2018-01-19 DIAGNOSIS — F31.60 BIPOLAR AFFECTIVE DISORDER, CURRENT EPISODE MIXED, CURRENT EPISODE SEVERITY UNSPECIFIED (HCC): ICD-10-CM

## 2018-01-19 DIAGNOSIS — G43.809 OTHER MIGRAINE WITHOUT STATUS MIGRAINOSUS, NOT INTRACTABLE: Primary | ICD-10-CM

## 2018-01-19 DIAGNOSIS — F32.81 PMDD (PREMENSTRUAL DYSPHORIC DISORDER): ICD-10-CM

## 2018-01-19 PROCEDURE — 99204 OFFICE O/P NEW MOD 45 MIN: CPT | Performed by: OBSTETRICS & GYNECOLOGY
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
## 2018-01-22 ENCOUNTER — TELEPHONE (OUTPATIENT)
Dept: OBSTETRICS AND GYNECOLOGY | Facility: CLINIC | Age: 38
End: 2018-01-22

## 2018-01-22 NOTE — TELEPHONE ENCOUNTER
Okay to see if patient wants to try oral medication; if so then okay to send in rx estradiol 0.5mg for two weeks prior to menses.

## 2018-01-22 NOTE — TELEPHONE ENCOUNTER
----- Message from Levi Altman sent at 1/22/2018 11:13 AM EST -----  Regarding: Prescription Question  Contact: [phone]  Hello.    I went to  the hormone patch at [facility] and the insurance has denied it.    Can you help?    Please and thanks,  Levi Altman

## 2018-01-24 RX ORDER — ESTRADIOL 0.5 MG/1
TABLET ORAL
Qty: 14 TABLET | Refills: 11 | Status: SHIPPED | OUTPATIENT
Start: 2018-01-24 | End: 2018-03-21

## 2018-02-06 DIAGNOSIS — F31.9 BIPOLAR DEPRESSION (HCC): ICD-10-CM

## 2018-02-06 RX ORDER — VORTIOXETINE 5 MG/1
TABLET, FILM COATED ORAL
Qty: 30 TABLET | Refills: 0 | Status: SHIPPED | OUTPATIENT
Start: 2018-02-06 | End: 2018-03-04 | Stop reason: SDUPTHER

## 2018-02-08 ENCOUNTER — OFFICE VISIT (OUTPATIENT)
Dept: INTERNAL MEDICINE | Facility: CLINIC | Age: 38
End: 2018-02-08

## 2018-02-08 VITALS
DIASTOLIC BLOOD PRESSURE: 86 MMHG | TEMPERATURE: 98.7 F | HEIGHT: 67 IN | SYSTOLIC BLOOD PRESSURE: 142 MMHG | HEART RATE: 101 BPM | BODY MASS INDEX: 39.39 KG/M2 | WEIGHT: 251 LBS | OXYGEN SATURATION: 99 %

## 2018-02-08 DIAGNOSIS — F32.81 PREMENSTRUAL DYSPHORIC DISORDER: ICD-10-CM

## 2018-02-08 DIAGNOSIS — F31.60 BIPOLAR DISORDER, MIXED (HCC): Primary | ICD-10-CM

## 2018-02-08 PROCEDURE — 99214 OFFICE O/P EST MOD 30 MIN: CPT | Performed by: PHYSICIAN ASSISTANT

## 2018-02-08 NOTE — PROGRESS NOTES
Nicole Altman is a 37 y.o. female.     Subjective   History of Present Illness   Here today for 1 month follow up of bipolar depression. She feels that Trintellix has helped her tremendously. She feels that her suicidal ideation has been significantly better, even prior to her last menses beginning.  She denies any manic or hypomanic symptoms.   She has adopted a dog and is in the process of having it certified as a service dog. She saw Dr. Zeng last month who prescribed estradiol 0.5 mg to take daily for 2 weeks leading up to her menses as treatment for premenstrual dysphoric disorder. She plans to start estradiol today since her menses are due in about 2 weeks.          The following portions of the patient's history were reviewed and updated as appropriate: allergies, current medications, past family history, past medical history, past social history, past surgical history and problem list.    Review of Systems    Constitutional: Negative for appetite change, chills, fatigue, fever and unexpected weight change.   HENT: Negative for congestion, ear pain, hearing loss, nosebleeds, postnasal drip, rhinorrhea, sore throat, tinnitus and trouble swallowing.    Eyes: Negative for photophobia, discharge and visual disturbance.   Respiratory: Negative for cough, chest tightness, shortness of breath and wheezing.    Cardiovascular: Negative for chest pain, palpitations and leg swelling.   Gastrointestinal: Negative for abdominal distention, abdominal pain, blood in stool, constipation, diarrhea, nausea and vomiting.   Endocrine: Negative for cold intolerance, heat intolerance, polydipsia, polyphagia and polyuria.   Musculoskeletal: Negative for arthralgias, back pain, joint swelling, myalgias, neck pain and neck stiffness.   Skin: Negative for color change, pallor, rash and wound.   Allergic/Immunologic: Negative for environmental allergies, food allergies and immunocompromised state.   Neurological: Negative for  "dizziness, tremors, seizures, weakness, numbness and headaches.   Hematological: Negative for adenopathy. Does not bruise/bleed easily.   Psychiatric/Behavioral: dysphoric mood, suicidal ideation, anxiety- improved.  Negative for sleep disturbances, agitation.       Objective    Physical Exam  Constitutional: Oriented to person, place, and time. Appears well-developed and well-nourished.   HENT:   Head: Normocephalic and atraumatic.   Eyes: EOM are normal. Pupils are equal, round, and reactive to light.   Neck: Normal range of motion. Neck supple.   Cardiovascular: Normal rate, regular rhythm and normal heart sounds.    Pulmonary/Chest: Effort normal and breath sounds normal. No respiratory distress.  Has no wheezes or rales. Exhibits no chest wall tenderness.   Abdominal: Soft. Bowel sounds are normal. Exhibits no distension and no mass. There is no tenderness.   Musculoskeletal: Normal range of motion. Exhibits no tenderness.   Neurological: Alert and oriented to person, place, and time.   Skin: Skin is warm and dry.   Psychiatric: Has a normal mood and affect. Behavior is normal. Judgment and thought content normal.       /86  Pulse 101  Temp 98.7 °F (37.1 °C)  Ht 170.2 cm (67.01\")  Wt 114 kg (251 lb)  SpO2 99%  BMI 39.3 kg/m2    Nursing note and vitals reviewed.        Assessment/Plan   Nicole was seen today for follow-up.    Diagnoses and all orders for this visit:    Bipolar disorder, mixed  Significant improvement in dysphoric mood and suicidal ideation since beginning Trintellix 1 month ago. Continue daily use.     Premenstrual dysphoric disorder  Begin Estradiol 0.5 mg daily today as menses are due to begin in about 2 weeks.       F/u 3 months.          "

## 2018-02-13 ENCOUNTER — PROCEDURE VISIT (OUTPATIENT)
Dept: NEUROLOGY | Facility: CLINIC | Age: 38
End: 2018-02-13

## 2018-02-13 VITALS
OXYGEN SATURATION: 99 % | SYSTOLIC BLOOD PRESSURE: 128 MMHG | WEIGHT: 251 LBS | HEIGHT: 67 IN | HEART RATE: 107 BPM | DIASTOLIC BLOOD PRESSURE: 84 MMHG | BODY MASS INDEX: 39.39 KG/M2

## 2018-02-13 DIAGNOSIS — F84.0 AUTISM: ICD-10-CM

## 2018-02-13 DIAGNOSIS — F31.9 BIPOLAR 1 DISORDER (HCC): Primary | ICD-10-CM

## 2018-02-13 DIAGNOSIS — M54.2 CERVICALGIA: ICD-10-CM

## 2018-02-13 DIAGNOSIS — G43.119 INTRACTABLE MIGRAINE WITH AURA WITHOUT STATUS MIGRAINOSUS: ICD-10-CM

## 2018-02-13 PROCEDURE — 20553 NJX 1/MLT TRIGGER POINTS 3/>: CPT | Performed by: NURSE PRACTITIONER

## 2018-02-21 ENCOUNTER — TELEPHONE (OUTPATIENT)
Dept: OBSTETRICS AND GYNECOLOGY | Facility: CLINIC | Age: 38
End: 2018-02-21

## 2018-02-21 NOTE — TELEPHONE ENCOUNTER
----- Message from Levi Altman sent at 2/21/2018  1:39 PM EST -----  Regarding: Non-Urgent Medical Question  Contact: [phone]  Hello.     I just wanted to let you know that, for the first time in over 2 decades, I am experiencing what I can only call happiness.     I did not spend half a month in suicidal despair and it is amazing how life looks different right now.     Thanks for listening to my symptoms and being knowledgeable.     Sincerely,  Levi Altman

## 2018-02-26 RX ORDER — BUPIVACAINE HYDROCHLORIDE 5 MG/ML
5 INJECTION, SOLUTION PERINEURAL ONCE
Status: COMPLETED | OUTPATIENT
Start: 2018-02-26 | End: 2018-02-26

## 2018-02-26 RX ORDER — METHYLPREDNISOLONE ACETATE 40 MG/ML
200 INJECTION, SUSPENSION INTRA-ARTICULAR; INTRALESIONAL; INTRAMUSCULAR; SOFT TISSUE ONCE
Status: COMPLETED | OUTPATIENT
Start: 2018-02-26 | End: 2018-02-26

## 2018-02-26 RX ADMIN — BUPIVACAINE HYDROCHLORIDE 5 ML: 5 INJECTION, SOLUTION PERINEURAL at 15:52

## 2018-02-26 RX ADMIN — METHYLPREDNISOLONE ACETATE 200 MG: 40 INJECTION, SUSPENSION INTRA-ARTICULAR; INTRALESIONAL; INTRAMUSCULAR; SOFT TISSUE at 15:52

## 2018-02-26 NOTE — PROGRESS NOTES
Patient is suffering from headache and myofacial pain syndrome. Pt states trigger points help with migraines and cervicalgia.Risks and benefits of the Trigger point injection procedure have been explained to the patient.  Patient has no contraindications such as bleed diathesis, recent acute trauma at the muscle sites, anesthetic allergy or anticoagulation.  Mechanism of trigger point injection has been explained to patient.     Procedure Note:  1.         Patient was positioned comfortably  2.         Before injections are started, 10 Trigger Points sites are identified throughout the bilateral upper trapezius muscle, levator scapulae, and erector spinae muscles.    3.         Overlying skin area was cleaned with Alcohol swab                                                                                                              4.         Before injection, trigger points sites were palpated for local twitch and referred pain to confirms placement                         5.         Local anesthetic was mixed with Depo-Medrol (5 cc of Marcaine 0.5% mixed with 5 cc of Depo-Medrol at 40 mg/ml - for a total of 10 cc)  6.         30 gauge ½ inch needle was utilized to ensure patient comfort          7.         I started with the most tender spot in above mentioned Trigger Points   1.   Localize most tender spot within taut muscle-fibers  2.   Fix tender spot between fingers (1-2 cm in size)   1.   Prevents from rolling away from needle  2.   Controls subcutaneous bleeding  3.   Before injection, patient was instructed of possible pain on injection  4.   Direct needle at 30 degree angle off skin   8.         Insert needle into skin 1-2 cm from Trigger Point   9.         Advance needle into Trigger Point   10.       Use 1cc anesthetic at each Trigger Points identified in step #2  11.       Redirect needle and reinject                                                                                              1.    Withdraw needle to subcutaneous tissue  2.   Redirect needle into adjacent tender areas  3.   Repeat until local twitch or tautness resolves  12.   After each of the 10 injections I held direct pressure at injection site for 2 minutes to prevents hematoma formation  13.   The same procedure was repeated for other Tender Points located in the upper trapezius muscle, levator scapulae and erector spinae bilaterally  14.   Patient was instructed to gently stretch injected areas to full active range of motion in all directions and was instructed to repeat range of motion three times after injection  15.   Post-Procedure patient was instructed to avoid over-using injected area for 3-4 days   1.   Maintain active range of motion of injected muscle  2.   Patient applies ice to injected areas for a few hours  3.   Anticipate post-injection soreness for 3-4 days  There was no evidence of complications from injection was noted such as local Skin Infection  at injection site or local hematoma at injection site     Pt requesting eval for autism.  Will consult neuropysch

## 2018-02-28 RX ORDER — LAMOTRIGINE 150 MG/1
TABLET ORAL
Qty: 60 TABLET | Refills: 1 | Status: SHIPPED | OUTPATIENT
Start: 2018-02-28 | End: 2018-04-28 | Stop reason: SDUPTHER

## 2018-03-02 RX ORDER — POLYETHYLENE GLYCOL 3350 17 G
4 POWDER IN PACKET (EA) ORAL AS NEEDED
Qty: 108 LOZENGE | Refills: 1 | Status: ON HOLD | OUTPATIENT
Start: 2018-03-02 | End: 2018-06-02

## 2018-03-04 DIAGNOSIS — F31.9 BIPOLAR DEPRESSION (HCC): ICD-10-CM

## 2018-03-05 RX ORDER — VORTIOXETINE 5 MG/1
TABLET, FILM COATED ORAL
Qty: 30 TABLET | Refills: 0 | Status: SHIPPED | OUTPATIENT
Start: 2018-03-05 | End: 2018-03-19 | Stop reason: SDUPTHER

## 2018-03-05 RX ORDER — METOPROLOL SUCCINATE 50 MG/1
TABLET, EXTENDED RELEASE ORAL
Qty: 30 TABLET | Refills: 5 | Status: ON HOLD | OUTPATIENT
Start: 2018-03-05 | End: 2018-06-02

## 2018-03-12 ENCOUNTER — TELEPHONE (OUTPATIENT)
Dept: OBSTETRICS AND GYNECOLOGY | Facility: CLINIC | Age: 38
End: 2018-03-12

## 2018-03-12 NOTE — TELEPHONE ENCOUNTER
Need to see if patient is week prior to menses; if no suicidal ideation then patient to keep f/u appt unless worsening of symptoms then can consider increasing dose.

## 2018-03-12 NOTE — TELEPHONE ENCOUNTER
----- Message from Levi Altman sent at 3/10/2018 10:31 AM EST -----  Regarding: Non-Urgent Medical Question  Contact: [phone]  Hello.     I'm 5 days into my second month on Erstradil.     While no where near as bad as previous months without medication I am still struggling.     Lots of negative thoughts/feelings. No energy. Just generally feeling like, to.     I have an appointment on the 21st but I wanted to let you know in case any changes can be made beforehand.     Please and thanks,  Levi Altman

## 2018-03-13 RX ORDER — METHYLPREDNISOLONE 4 MG/1
TABLET ORAL
Refills: 0 | OUTPATIENT
Start: 2018-03-13

## 2018-03-15 RX ORDER — METHYLPREDNISOLONE 4 MG/1
TABLET ORAL
Qty: 1 EACH | Refills: 0 | Status: SHIPPED | OUTPATIENT
Start: 2018-03-15 | End: 2018-05-11

## 2018-03-15 RX ORDER — METHYLPREDNISOLONE 4 MG/1
TABLET ORAL
Refills: 0 | OUTPATIENT
Start: 2018-03-15

## 2018-03-19 DIAGNOSIS — F31.9 BIPOLAR DEPRESSION (HCC): ICD-10-CM

## 2018-03-21 ENCOUNTER — OFFICE VISIT (OUTPATIENT)
Dept: OBSTETRICS AND GYNECOLOGY | Facility: CLINIC | Age: 38
End: 2018-03-21

## 2018-03-21 VITALS
HEIGHT: 67 IN | WEIGHT: 251 LBS | SYSTOLIC BLOOD PRESSURE: 132 MMHG | BODY MASS INDEX: 39.39 KG/M2 | DIASTOLIC BLOOD PRESSURE: 70 MMHG

## 2018-03-21 DIAGNOSIS — G43.809 OTHER MIGRAINE WITHOUT STATUS MIGRAINOSUS, NOT INTRACTABLE: ICD-10-CM

## 2018-03-21 DIAGNOSIS — F31.60 BIPOLAR AFFECTIVE DISORDER, CURRENT EPISODE MIXED, CURRENT EPISODE SEVERITY UNSPECIFIED (HCC): ICD-10-CM

## 2018-03-21 DIAGNOSIS — F32.81 PMDD (PREMENSTRUAL DYSPHORIC DISORDER): Primary | ICD-10-CM

## 2018-03-21 PROCEDURE — 99213 OFFICE O/P EST LOW 20 MIN: CPT | Performed by: OBSTETRICS & GYNECOLOGY

## 2018-03-21 RX ORDER — FUROSEMIDE 20 MG/1
TABLET ORAL
Refills: 3 | COMMUNITY
Start: 2018-02-22 | End: 2018-05-11 | Stop reason: SDUPTHER

## 2018-03-21 RX ORDER — ESTRADIOL 0.05 MG/D
1 PATCH, EXTENDED RELEASE TRANSDERMAL 2 TIMES WEEKLY
Qty: 8 PATCH | Refills: 12 | Status: ON HOLD | OUTPATIENT
Start: 2018-03-22 | End: 2018-06-02
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
## 2018-04-09 DIAGNOSIS — F31.9 BIPOLAR DEPRESSION (HCC): ICD-10-CM

## 2018-04-23 ENCOUNTER — TELEPHONE (OUTPATIENT)
Dept: OBSTETRICS AND GYNECOLOGY | Facility: CLINIC | Age: 38
End: 2018-04-23

## 2018-04-23 NOTE — TELEPHONE ENCOUNTER
----- Message from Levi Altman sent at 4/20/2018  9:23 PM EDT -----  Regarding: Prescription Question  Contact: [phone]  Hello.     I am using the estrogen patches and I've run across a problem.     One patch fell off and had to be replaced early.     I'll have 2 days without a patch before they can be refilled on the 25th of this month.    I still have a bottle of estrogen pills, could I substitute those until I can refill the patches?     Or...?    Thank you for your help in advance.     Sincerely,  Levi Altman

## 2018-04-24 NOTE — TELEPHONE ENCOUNTER
Called and spoke with pharmacy, they can give her early refill but insurance isn't going to cover it and its 84.99.

## 2018-04-30 RX ORDER — FUROSEMIDE 20 MG/1
TABLET ORAL
Qty: 15 TABLET | Refills: 2 | Status: SHIPPED | OUTPATIENT
Start: 2018-04-30 | End: 2018-05-21

## 2018-04-30 RX ORDER — LAMOTRIGINE 150 MG/1
TABLET ORAL
Qty: 60 TABLET | Refills: 1 | Status: SHIPPED | OUTPATIENT
Start: 2018-04-30 | End: 2018-05-04 | Stop reason: SDUPTHER

## 2018-04-30 RX ORDER — LITHIUM CARBONATE 300 MG/1
CAPSULE ORAL
Qty: 90 CAPSULE | Refills: 5 | Status: SHIPPED | OUTPATIENT
Start: 2018-04-30 | End: 2018-05-21 | Stop reason: SDDI

## 2018-05-04 RX ORDER — LAMOTRIGINE 150 MG/1
150 TABLET ORAL 2 TIMES DAILY
Qty: 60 TABLET | Refills: 5 | Status: SHIPPED | OUTPATIENT
Start: 2018-05-04 | End: 2018-07-27 | Stop reason: SDUPTHER

## 2018-05-11 ENCOUNTER — OFFICE VISIT (OUTPATIENT)
Dept: INTERNAL MEDICINE | Facility: CLINIC | Age: 38
End: 2018-05-11

## 2018-05-11 VITALS
HEIGHT: 67 IN | OXYGEN SATURATION: 99 % | DIASTOLIC BLOOD PRESSURE: 96 MMHG | BODY MASS INDEX: 40.34 KG/M2 | HEART RATE: 97 BPM | WEIGHT: 257 LBS | TEMPERATURE: 98.7 F | SYSTOLIC BLOOD PRESSURE: 142 MMHG

## 2018-05-11 DIAGNOSIS — F31.60 BIPOLAR DISORDER, MIXED (HCC): Primary | ICD-10-CM

## 2018-05-11 DIAGNOSIS — Z72.0 TOBACCO USE: ICD-10-CM

## 2018-05-11 DIAGNOSIS — F32.81 PREMENSTRUAL DYSPHORIC DISORDER: ICD-10-CM

## 2018-05-11 PROCEDURE — 99214 OFFICE O/P EST MOD 30 MIN: CPT | Performed by: PHYSICIAN ASSISTANT

## 2018-05-11 PROCEDURE — 99406 BEHAV CHNG SMOKING 3-10 MIN: CPT | Performed by: PHYSICIAN ASSISTANT

## 2018-05-11 NOTE — PROGRESS NOTES
Nicole Altman is a 37 y.o. female.     Subjective   History of Present Illness   Here today for follow up of bipolar depression. She reports anhedonia which improved some with dose increase of Trintellix but is still bothersome.  No recent suicidal ideation and no history of homicidal ideation.  PMDD is currently being treated with Vivelle-Dot (3 weeks on, 1 weeks off) by Dr. Zeng, though now her menstrual cycle is irregular and she is unsure when to expect it.     She is ready for smoking cessation. She has used Chantix in the past and experienced nausea and bad dreams with it but otherwise tolerated. Nicotine patches, gum and lozenges have also been tried and were unsuccessful. Wellbutrin was also unsuccessful though she continues to use.         The following portions of the patient's history were reviewed and updated as appropriate: allergies, current medications, past family history, past medical history, past social history, past surgical history and problem list.    Review of Systems    Constitutional: Negative for appetite change, chills, fatigue, fever and unexpected weight change.   HENT: Negative for congestion, ear pain, hearing loss, nosebleeds, postnasal drip, rhinorrhea, sore throat, tinnitus and trouble swallowing.    Eyes: Negative for photophobia, discharge and visual disturbance.   Respiratory: Negative for cough, chest tightness, shortness of breath and wheezing.    Cardiovascular: Negative for chest pain, palpitations and leg swelling.   Gastrointestinal: Negative for abdominal distention, abdominal pain, blood in stool, constipation, diarrhea, nausea and vomiting.   Endocrine: Irregular menses. Negative for cold intolerance, heat intolerance, polydipsia, polyphagia and polyuria.   Musculoskeletal: Negative for arthralgias, back pain, joint swelling, myalgias, neck pain and neck stiffness.   Skin: Negative for color change, pallor, rash and wound.   Allergic/Immunologic: Negative for  "environmental allergies, food allergies and immunocompromised state.   Neurological: Negative for dizziness, tremors, seizures, weakness, numbness and headaches.   Hematological: Negative for adenopathy. Does not bruise/bleed easily.   Psychiatric/Behavioral: dysphoric mood, anhedonia, anxious. Negative for sleep disturbances, agitation, behavioral problems, confusion, hallucinations, self-injury and suicidal ideas.     Objective    Physical Exam  Constitutional: No acute distress. Appears well-developed and well-nourished.   Head: Normocephalic and atraumatic.   Eyes: EOM are normal. Pupils are equal, round, and reactive to light.   Neck: Normal range of motion. Neck supple.   Cardiovascular: Normal rate, regular rhythm and normal heart sounds.    Pulmonary/Chest: Effort normal and breath sounds normal. No respiratory distress.  Has no wheezes or rales. Exhibits no chest wall tenderness.   Abdominal: Soft. Bowel sounds are normal. Exhibits no distension and no mass. There is no tenderness.   Musculoskeletal: Normal range of motion. Exhibits no tenderness.   Neurological: Alert and oriented to person, place, and time.   Skin: Skin is warm and dry.   Psychiatric: Has a normal mood and affect. Behavior is normal. Judgment and thought content normal.       /96   Pulse 97   Temp 98.7 °F (37.1 °C)   Ht 170.2 cm (67.01\")   Wt 117 kg (257 lb)   SpO2 99%   BMI 40.24 kg/m²     Nursing note and vitals reviewed.        Assessment/Plan   Diagnoses and all orders for this visit:    Bipolar disorder, mixed  Will continue Trintellix 10 mg daily until [employer] testing results can be reviewed to identify if she would respond better to an alternate treatment plan. She has tried and failed many psychotropic medications.     Premenstrual dysphoric disorder  Symptoms improving with Vivelle-Dot use, though menses now irregular. Continue follow up with GYN.     Tobacco abuse  Spent 5 minutes discussing smoking cessation. I " advised the patient of the risks in continuing to use tobacco.  I provided the patient with smoking cessation educational materials as well as discussed methods to quit smoking and patient has epxressed the willingness to quit.   After review of [employer] testing and we can ensure she is on the best possible treatment for bipolar disorder I will reconsider her request to use Chantix. She agrees with this plan.           She elected to follow up in 10 days to review [employer] results in person.

## 2018-05-18 DIAGNOSIS — R11.0 NAUSEA: ICD-10-CM

## 2018-05-18 RX ORDER — ONDANSETRON 8 MG/1
TABLET, ORALLY DISINTEGRATING ORAL
Qty: 12 TABLET | Refills: 5 | Status: SHIPPED | OUTPATIENT
Start: 2018-05-18 | End: 2019-09-13 | Stop reason: SDUPTHER

## 2018-05-21 ENCOUNTER — OFFICE VISIT (OUTPATIENT)
Dept: INTERNAL MEDICINE | Facility: CLINIC | Age: 38
End: 2018-05-21

## 2018-05-21 VITALS
SYSTOLIC BLOOD PRESSURE: 136 MMHG | OXYGEN SATURATION: 99 % | HEIGHT: 67 IN | HEART RATE: 88 BPM | DIASTOLIC BLOOD PRESSURE: 84 MMHG | TEMPERATURE: 98.7 F

## 2018-05-21 DIAGNOSIS — F31.60 BIPOLAR DISORDER, MIXED (HCC): Primary | ICD-10-CM

## 2018-05-21 DIAGNOSIS — Z72.0 TOBACCO USE: ICD-10-CM

## 2018-05-21 PROCEDURE — 99213 OFFICE O/P EST LOW 20 MIN: CPT | Performed by: PHYSICIAN ASSISTANT

## 2018-05-21 NOTE — PROGRESS NOTES
Nicole Altman is a 37 y.o. female.     Subjective   History of Present Illness   Here today for follow up of datatracker testing results. She admits she discontinued Lithium around 2 months ago and did so with a slow taper. She has noticed improvement in sedation, urinary frequency and shaking.  She does not feel there has been any considerable difference in her mood since discontinuing.  She previously used Lithium for over 10 years.  Trintellix dose increase from 10 to 15 mg daily around 10 days ago has been beneficial.  She admittedly went through a personal crisis recently and handled it better than she thought she would. Anxiety seems to be well controlled with Trintellix. No recent suicidal ideation since starting Trintellix.     She is ready to quit smoking. We completed tobacco cessation counseling 10 days ago during the last office visit.         The following portions of the patient's history were reviewed and updated as appropriate: allergies, current medications, past family history, past medical history, past social history, past surgical history and problem list.    Review of Systems    Constitutional: Negative for appetite change, chills, fatigue, fever and unexpected weight change.   HENT: Negative for congestion, ear pain, hearing loss, nosebleeds, postnasal drip, rhinorrhea, sore throat, tinnitus and trouble swallowing.    Eyes: Negative for photophobia, discharge and visual disturbance.   Respiratory: Negative for cough, chest tightness, shortness of breath and wheezing.    Cardiovascular: Negative for chest pain, palpitations and leg swelling.   Gastrointestinal: Negative for abdominal distention, abdominal pain, blood in stool, constipation, diarrhea, nausea and vomiting.   Endocrine: Negative for cold intolerance, heat intolerance, polydipsia, polyphagia and polyuria.   Musculoskeletal: Negative for arthralgias, back pain, joint swelling, myalgias, neck pain and neck stiffness.   Skin: Negative  "for color change, pallor, rash and wound.   Allergic/Immunologic: Negative for environmental allergies, food allergies and immunocompromised state.   Neurological: Negative for dizziness, tremors, seizures, weakness, numbness and headaches.   Hematological: Negative for adenopathy. Does not bruise/bleed easily.   Psychiatric/Behavioral: dysphoric mood, sleep disturbances. Negative for agitation, behavioral problems, confusion, hallucinations, self-injury and suicidal ideas.     Objective    Physical Exam  Constitutional: No acute distress. Appears well-developed and well-nourished.   Head: Normocephalic and atraumatic.   Eyes: EOM are normal. Pupils are equal, round, and reactive to light.   Neck: Normal range of motion. Neck supple.   Cardiovascular: Normal rate, regular rhythm and normal heart sounds.    Pulmonary/Chest: Effort normal and breath sounds normal. No respiratory distress.  Has no wheezes or rales. Exhibits no chest wall tenderness.   Abdominal: Soft. Bowel sounds are normal. Exhibits no distension and no mass. There is no tenderness.   Musculoskeletal: Normal range of motion. Exhibits no tenderness.   Neurological: Alert and oriented to person, place, and time.   Skin: Skin is warm and dry.   Psychiatric: Has a normal mood and affect. Behavior is normal. Judgment and thought content normal.       /84   Pulse 88   Temp 98.7 °F (37.1 °C)   Ht 170.2 cm (67.01\")   SpO2 99%     Nursing note and vitals reviewed.        Assessment/Plan   Nicole was seen today for follow-up.    Diagnoses and all orders for this visit:    Bipolar disorder, mixed  -     Vortioxetine HBr 5 MG tablet; Take one 5 mg tablet daily along with one 10 mg tablet, to equal 15 mg daily of med.  -     Vortioxetine HBr 10 MG tablet; Take one 10 mg tablet daily along with one 5 mg tablet, to equal 15 mg daily of med.  Well controlled, continue 15 mg daily.     Discussed results of [employer] testing at length and provided her with a " copy of the results to discuss with her therapist.     Tobacco use  -     varenicline (CHANTIX STARTING MONTH RAJESH) 0.5 MG X 11 & 1 MG X 42 tablet; Take 0.5 mg one daily on days 1-3 and and 0.5 mg twice daily on days 4-7.Then 1 mg twice daily for a total of 12 weeks.  We completed tobacco cessation counseling 10 days ago during the last office visit.   She is fully aware of the risks, benefits and potential side effects of Chantix and wishes to proceed with use. She will discontinue use immediately if any side effects occur, particularly mental status change.  She has tolerated Chantix in the past and was successful with smoking cessation.     F/u 1 month.

## 2018-05-22 ENCOUNTER — PROCEDURE VISIT (OUTPATIENT)
Dept: NEUROLOGY | Facility: CLINIC | Age: 38
End: 2018-05-22

## 2018-05-22 VITALS
WEIGHT: 257 LBS | BODY MASS INDEX: 40.34 KG/M2 | DIASTOLIC BLOOD PRESSURE: 78 MMHG | OXYGEN SATURATION: 99 % | HEIGHT: 67 IN | HEART RATE: 72 BPM | SYSTOLIC BLOOD PRESSURE: 126 MMHG

## 2018-05-22 DIAGNOSIS — M54.2 CERVICALGIA: ICD-10-CM

## 2018-05-22 DIAGNOSIS — G43.009 MIGRAINE WITHOUT AURA AND WITHOUT STATUS MIGRAINOSUS, NOT INTRACTABLE: Primary | ICD-10-CM

## 2018-05-22 PROCEDURE — 20553 NJX 1/MLT TRIGGER POINTS 3/>: CPT | Performed by: NURSE PRACTITIONER

## 2018-05-22 RX ORDER — METHYLPREDNISOLONE ACETATE 40 MG/ML
200 INJECTION, SUSPENSION INTRA-ARTICULAR; INTRALESIONAL; INTRAMUSCULAR; SOFT TISSUE ONCE
Status: COMPLETED | OUTPATIENT
Start: 2018-05-22 | End: 2018-05-22

## 2018-05-22 RX ORDER — BUPIVACAINE HYDROCHLORIDE 5 MG/ML
5 INJECTION, SOLUTION PERINEURAL ONCE
Status: COMPLETED | OUTPATIENT
Start: 2018-05-22 | End: 2018-05-22

## 2018-05-22 RX ADMIN — BUPIVACAINE HYDROCHLORIDE 5 ML: 5 INJECTION, SOLUTION PERINEURAL at 11:30

## 2018-05-22 RX ADMIN — METHYLPREDNISOLONE ACETATE 200 MG: 40 INJECTION, SUSPENSION INTRA-ARTICULAR; INTRALESIONAL; INTRAMUSCULAR; SOFT TISSUE at 11:29

## 2018-05-22 NOTE — PROGRESS NOTES
Patient is suffering from headache and myofacial pain syndrome.  Patient states her neck pain and migraines have improved with trigger point.  Risks and benefits of the Trigger point injection procedure have been explained to the patient.  Patient has no contraindications such as bleed diathesis, recent acute trauma at the muscle sites, anesthetic allergy or anticoagulation.  Mechanism of trigger point injection has been explained to patient.     Procedure Note:  1.         Patient was positioned comfortably  2.         Before injections are started, 10 Trigger Points sites are identified throughout the bilateral upper trapezius muscle, levator scapulae, and erector spinae muscles.    3.         Overlying skin area was cleaned with Alcohol swab                                                                                                              4.         Before injection, trigger points sites were palpated for local twitch and referred pain to confirms placement                         5.         Local anesthetic was mixed with Depo-Medrol (5 cc of Marcaine 0.5% mixed with 5 cc of Depo-Medrol at 40 mg/ml - for a total of 10 cc)  6.         30 gauge ½ inch needle was utilized to ensure patient comfort          7.         I started with the most tender spot in above mentioned Trigger Points   1.   Localize most tender spot within taut muscle-fibers  2.   Fix tender spot between fingers (1-2 cm in size)   1.   Prevents from rolling away from needle  2.   Controls subcutaneous bleeding  3.   Before injection, patient was instructed of possible pain on injection  4.   Direct needle at 30 degree angle off skin   8.         Insert needle into skin 1-2 cm from Trigger Point   9.         Advance needle into Trigger Point   10.       Use 1cc anesthetic at each Trigger Points identified in step #2  11.       Redirect needle and reinject                                                                                               1.   Withdraw needle to subcutaneous tissue  2.   Redirect needle into adjacent tender areas  3.   Repeat until local twitch or tautness resolves  12.   After each of the 10 injections I held direct pressure at injection site for 2 minutes to prevents hematoma formation  13.   The same procedure was repeated for other Tender Points located in the upper trapezius muscle, levator scapulae and erector spinae bilaterally  14.   Patient was instructed to gently stretch injected areas to full active range of motion in all directions and was instructed to repeat range of motion three times after injection  15.   Post-Procedure patient was instructed to avoid over-using injected area for 3-4 days   1.   Maintain active range of motion of injected muscle  2.   Patient applies ice to injected areas for a few hours  3.   Anticipate post-injection soreness for 3-4 days  There was no evidence of complications from injection was noted such as local Skin Infection  at injection site or local hematoma at injection site

## 2018-06-01 ENCOUNTER — OFFICE VISIT (OUTPATIENT)
Dept: INTERNAL MEDICINE | Facility: CLINIC | Age: 38
End: 2018-06-01

## 2018-06-01 ENCOUNTER — HOSPITAL ENCOUNTER (EMERGENCY)
Facility: HOSPITAL | Age: 38
Discharge: PSYCHIATRIC OR SUBSTANCE USE FACILITY | End: 2018-06-01
Attending: EMERGENCY MEDICINE | Admitting: EMERGENCY MEDICINE

## 2018-06-01 ENCOUNTER — TELEPHONE (OUTPATIENT)
Dept: INTERNAL MEDICINE | Facility: CLINIC | Age: 38
End: 2018-06-01

## 2018-06-01 VITALS
HEART RATE: 80 BPM | BODY MASS INDEX: 40.65 KG/M2 | TEMPERATURE: 98.6 F | OXYGEN SATURATION: 98 % | DIASTOLIC BLOOD PRESSURE: 94 MMHG | WEIGHT: 259 LBS | SYSTOLIC BLOOD PRESSURE: 142 MMHG | HEIGHT: 67 IN

## 2018-06-01 VITALS
HEART RATE: 69 BPM | WEIGHT: 261.8 LBS | RESPIRATION RATE: 18 BRPM | SYSTOLIC BLOOD PRESSURE: 160 MMHG | OXYGEN SATURATION: 98 % | TEMPERATURE: 98.2 F | HEIGHT: 67 IN | DIASTOLIC BLOOD PRESSURE: 89 MMHG | BODY MASS INDEX: 41.09 KG/M2

## 2018-06-01 DIAGNOSIS — F31.12 MODERATE BIPOLAR I DISORDER WITH MANIA AS CURRENT EPISODE (HCC): ICD-10-CM

## 2018-06-01 DIAGNOSIS — F99 PSYCHIATRIC DISORDER: Primary | ICD-10-CM

## 2018-06-01 DIAGNOSIS — F31.60 BIPOLAR DISORDER, MIXED (HCC): Primary | ICD-10-CM

## 2018-06-01 LAB
ALBUMIN SERPL-MCNC: 4 G/DL (ref 3.5–5)
ALBUMIN/GLOB SERPL: 1.3 G/DL (ref 1–2)
ALP SERPL-CCNC: 77 U/L (ref 38–126)
ALT SERPL W P-5'-P-CCNC: 17 U/L (ref 13–69)
AMPHET+METHAMPHET UR QL: NEGATIVE
AMPHETAMINES UR QL: NEGATIVE
ANION GAP SERPL CALCULATED.3IONS-SCNC: 18.1 MMOL/L (ref 10–20)
AST SERPL-CCNC: 14 U/L (ref 15–46)
B-HCG UR QL: NEGATIVE
BACTERIA UR QL AUTO: ABNORMAL /HPF
BARBITURATES UR QL SCN: NEGATIVE
BASOPHILS # BLD AUTO: 0.07 10*3/MM3 (ref 0–0.2)
BASOPHILS NFR BLD AUTO: 0.4 % (ref 0–2.5)
BENZODIAZ UR QL SCN: NEGATIVE
BILIRUB SERPL-MCNC: 0.3 MG/DL (ref 0.2–1.3)
BILIRUB UR QL STRIP: NEGATIVE
BUN BLD-MCNC: 17 MG/DL (ref 7–20)
BUN/CREAT SERPL: 21.3 (ref 7.1–23.5)
BUPRENORPHINE SERPL-MCNC: NEGATIVE NG/ML
CALCIUM SPEC-SCNC: 9.5 MG/DL (ref 8.4–10.2)
CANNABINOIDS SERPL QL: NEGATIVE
CHLORIDE SERPL-SCNC: 105 MMOL/L (ref 98–107)
CLARITY UR: CLEAR
CO2 SERPL-SCNC: 21 MMOL/L (ref 26–30)
COCAINE UR QL: NEGATIVE
COLOR UR: YELLOW
CREAT BLD-MCNC: 0.8 MG/DL (ref 0.6–1.3)
DEPRECATED RDW RBC AUTO: 44.1 FL (ref 37–54)
EOSINOPHIL # BLD AUTO: 0.3 10*3/MM3 (ref 0–0.7)
EOSINOPHIL NFR BLD AUTO: 1.8 % (ref 0–7)
ERYTHROCYTE [DISTWIDTH] IN BLOOD BY AUTOMATED COUNT: 13.7 % (ref 11.5–14.5)
ETHANOL BLD-MCNC: <10 MG/DL
ETHANOL UR QL: <0.01 %
GFR SERPL CREATININE-BSD FRML MDRD: 80 ML/MIN/1.73
GLOBULIN UR ELPH-MCNC: 3 GM/DL
GLUCOSE BLD-MCNC: 136 MG/DL (ref 74–98)
GLUCOSE UR STRIP-MCNC: NEGATIVE MG/DL
HCT VFR BLD AUTO: 42 % (ref 37–47)
HGB BLD-MCNC: 14 G/DL (ref 12–16)
HGB UR QL STRIP.AUTO: ABNORMAL
HYALINE CASTS UR QL AUTO: ABNORMAL /LPF
IMM GRANULOCYTES # BLD: 0.11 10*3/MM3 (ref 0–0.06)
IMM GRANULOCYTES NFR BLD: 0.7 % (ref 0–0.6)
KETONES UR QL STRIP: NEGATIVE
LEUKOCYTE ESTERASE UR QL STRIP.AUTO: NEGATIVE
LYMPHOCYTES # BLD AUTO: 2.43 10*3/MM3 (ref 0.6–3.4)
LYMPHOCYTES NFR BLD AUTO: 14.7 % (ref 10–50)
MCH RBC QN AUTO: 29.4 PG (ref 27–31)
MCHC RBC AUTO-ENTMCNC: 33.3 G/DL (ref 30–37)
MCV RBC AUTO: 88.2 FL (ref 81–99)
METHADONE UR QL SCN: NEGATIVE
MONOCYTES # BLD AUTO: 1.01 10*3/MM3 (ref 0–0.9)
MONOCYTES NFR BLD AUTO: 6.1 % (ref 0–12)
MUCOUS THREADS URNS QL MICRO: ABNORMAL /HPF
NEUTROPHILS # BLD AUTO: 12.61 10*3/MM3 (ref 2–6.9)
NEUTROPHILS NFR BLD AUTO: 76.3 % (ref 37–80)
NITRITE UR QL STRIP: NEGATIVE
NRBC BLD MANUAL-RTO: 0 /100 WBC (ref 0–0)
OPIATES UR QL: NEGATIVE
OXYCODONE UR QL SCN: NEGATIVE
PCP UR QL SCN: NEGATIVE
PH UR STRIP.AUTO: 5.5 [PH] (ref 5–8)
PLATELET # BLD AUTO: 289 10*3/MM3 (ref 130–400)
PMV BLD AUTO: 10 FL (ref 6–12)
POTASSIUM BLD-SCNC: 4.1 MMOL/L (ref 3.5–5.1)
PROPOXYPH UR QL: NEGATIVE
PROT SERPL-MCNC: 7 G/DL (ref 6.3–8.2)
PROT UR QL STRIP: NEGATIVE
RBC # BLD AUTO: 4.76 10*6/MM3 (ref 4.2–5.4)
RBC # UR: ABNORMAL /HPF
REF LAB TEST METHOD: ABNORMAL
SODIUM BLD-SCNC: 140 MMOL/L (ref 137–145)
SP GR UR STRIP: 1.01 (ref 1–1.03)
SQUAMOUS #/AREA URNS HPF: ABNORMAL /HPF
TRICYCLICS UR QL SCN: NEGATIVE
UROBILINOGEN UR QL STRIP: ABNORMAL
WBC NRBC COR # BLD: 16.53 10*3/MM3 (ref 4.8–10.8)
WBC UR QL AUTO: ABNORMAL /HPF

## 2018-06-01 PROCEDURE — 85025 COMPLETE CBC W/AUTO DIFF WBC: CPT | Performed by: PHYSICIAN ASSISTANT

## 2018-06-01 PROCEDURE — 99284 EMERGENCY DEPT VISIT MOD MDM: CPT

## 2018-06-01 PROCEDURE — 80306 DRUG TEST PRSMV INSTRMNT: CPT | Performed by: PHYSICIAN ASSISTANT

## 2018-06-01 PROCEDURE — 81001 URINALYSIS AUTO W/SCOPE: CPT | Performed by: PHYSICIAN ASSISTANT

## 2018-06-01 PROCEDURE — 93005 ELECTROCARDIOGRAM TRACING: CPT | Performed by: PHYSICIAN ASSISTANT

## 2018-06-01 PROCEDURE — 80307 DRUG TEST PRSMV CHEM ANLYZR: CPT | Performed by: PHYSICIAN ASSISTANT

## 2018-06-01 PROCEDURE — 99213 OFFICE O/P EST LOW 20 MIN: CPT | Performed by: PHYSICIAN ASSISTANT

## 2018-06-01 PROCEDURE — 81025 URINE PREGNANCY TEST: CPT | Performed by: PHYSICIAN ASSISTANT

## 2018-06-01 PROCEDURE — 80053 COMPREHEN METABOLIC PANEL: CPT | Performed by: PHYSICIAN ASSISTANT

## 2018-06-01 PROCEDURE — 36415 COLL VENOUS BLD VENIPUNCTURE: CPT

## 2018-06-01 PROCEDURE — 99285 EMERGENCY DEPT VISIT HI MDM: CPT

## 2018-06-01 RX ORDER — IBUPROFEN 800 MG/1
800 TABLET ORAL ONCE
Status: COMPLETED | OUTPATIENT
Start: 2018-06-01 | End: 2018-06-01

## 2018-06-01 RX ORDER — CLONIDINE HYDROCHLORIDE 0.1 MG/1
0.1 TABLET ORAL ONCE
Status: CANCELLED | OUTPATIENT
Start: 2018-06-01 | End: 2018-06-01

## 2018-06-01 RX ADMIN — IBUPROFEN 800 MG: 800 TABLET ORAL at 18:32

## 2018-06-01 NOTE — PROGRESS NOTES
Nicole Altman is a 37 y.o. female.     Subjective   History of Present Illness   Here today accompanied by a friend who knows her mental health history well who is concerned with 2 days of insomnia, elated mood and hypersexuality.  She has a history of bipolar disorder with multiple manic episodes and current symptoms are similar. She denies taking any extra of her medications in the last few weeks. She took her normal medications this morning. Her friend has taken control of her medications effective today to ensure she does not taken anything else.         The following portions of the patient's history were reviewed and updated as appropriate: allergies, current medications, past family history, past medical history, past social history, past surgical history and problem list.    Review of Systems    Constitutional: Negative for appetite change, chills, fatigue, fever and unexpected weight change.   Respiratory: Negative for cough, chest tightness, shortness of breath and wheezing.    Cardiovascular: Negative for chest pain, palpitations and leg swelling.   Neurological: Negative for dizziness, tremors, seizures, weakness, numbness and headaches.   Hematological: Negative for adenopathy. Does not bruise/bleed easily.   Psychiatric/Behavioral: insomnia, agitation, hypersexual feelings, elated feelings. The patient is not nervous/anxious.      Objective    Physical Exam  Constitutional: Oriented to person, place, and time. Appears well-developed and well-nourished.   Eyes: EOM are normal. Pupils are equal, round, and reactive to light.   Cardiovascular: Normal rate, regular rhythm and normal heart sounds.    Pulmonary/Chest: Effort normal and breath sounds normal. No respiratory distress.  Has no wheezes or rales. Exhibits no chest wall tenderness.   Abdominal: Soft. Bowel sounds are normal. Exhibits no distension and no mass. There is no tenderness.   Musculoskeletal: Normal range of motion. Exhibits no  "tenderness.   Neurological: Alert and oriented to person, place, and time.   Skin: Skin is warm and dry.   Psychiatric: Abnormally talkative. Fidgeting.  Hyperactive mood and affect. Poor judgement.       /94   Pulse 80   Temp 98.6 °F (37 °C)   Ht 170.2 cm (67.01\")   Wt 117 kg (259 lb)   SpO2 98%   BMI 40.56 kg/m²     Nursing note and vitals reviewed.        Assessment/Plan   Nicole was seen today for emotional issue.    Diagnoses and all orders for this visit:    Bipolar disorder, mixed    Moderate bipolar I disorder with carlos manuel as current episode          Spoke with Dr. Lord at [facility] to give report. Nicole went to [facility] ED with her friend escorting her. She will be evaluated for possible admission to [facility] for phychiatric care.          "
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
## 2018-06-02 ENCOUNTER — DOCUMENTATION (OUTPATIENT)
Dept: EMERGENCY DEPT | Facility: HOSPITAL | Age: 38
End: 2018-06-02

## 2018-06-02 ENCOUNTER — HOSPITAL ENCOUNTER (INPATIENT)
Facility: HOSPITAL | Age: 38
LOS: 1 days | Discharge: HOME | End: 2018-06-03
Attending: PSYCHIATRY & NEUROLOGY | Admitting: PSYCHIATRY & NEUROLOGY

## 2018-06-02 PROBLEM — F31.10 BIPOLAR I DISORDER WITH MANIA (HCC): Status: ACTIVE | Noted: 2018-06-02

## 2018-06-02 PROCEDURE — 99223 1ST HOSP IP/OBS HIGH 75: CPT | Performed by: PSYCHIATRY & NEUROLOGY

## 2018-06-02 RX ORDER — ALBUTEROL SULFATE 2.5 MG/3ML
2.5 SOLUTION RESPIRATORY (INHALATION) EVERY 4 HOURS PRN
Status: CANCELLED | OUTPATIENT
Start: 2018-06-02

## 2018-06-02 RX ORDER — HYDROXYZINE 50 MG/1
50 TABLET, FILM COATED ORAL EVERY 6 HOURS PRN
Status: DISCONTINUED | OUTPATIENT
Start: 2018-06-02 | End: 2018-06-03 | Stop reason: HOSPADM

## 2018-06-02 RX ORDER — LOSARTAN POTASSIUM 50 MG/1
100 TABLET ORAL NIGHTLY
Status: CANCELLED | OUTPATIENT
Start: 2018-06-02

## 2018-06-02 RX ORDER — IBUPROFEN 600 MG/1
600 TABLET ORAL EVERY 6 HOURS PRN
Status: DISCONTINUED | OUTPATIENT
Start: 2018-06-02 | End: 2018-06-03 | Stop reason: HOSPADM

## 2018-06-02 RX ORDER — LOPERAMIDE HYDROCHLORIDE 2 MG/1
2 CAPSULE ORAL 4 TIMES DAILY PRN
Status: DISCONTINUED | OUTPATIENT
Start: 2018-06-02 | End: 2018-06-03 | Stop reason: HOSPADM

## 2018-06-02 RX ORDER — BENZONATATE 100 MG/1
100 CAPSULE ORAL 3 TIMES DAILY PRN
Status: DISCONTINUED | OUTPATIENT
Start: 2018-06-02 | End: 2018-06-03 | Stop reason: HOSPADM

## 2018-06-02 RX ORDER — METOPROLOL SUCCINATE 100 MG/1
150 TABLET, EXTENDED RELEASE ORAL NIGHTLY
COMMUNITY
End: 2018-06-05 | Stop reason: SDUPTHER

## 2018-06-02 RX ORDER — METOPROLOL SUCCINATE 50 MG/1
150 TABLET, EXTENDED RELEASE ORAL
Status: DISCONTINUED | OUTPATIENT
Start: 2018-06-02 | End: 2018-06-03 | Stop reason: HOSPADM

## 2018-06-02 RX ORDER — METOPROLOL SUCCINATE 50 MG/1
150 TABLET, EXTENDED RELEASE ORAL NIGHTLY
Status: CANCELLED | OUTPATIENT
Start: 2018-06-02

## 2018-06-02 RX ORDER — LOSARTAN POTASSIUM 50 MG/1
100 TABLET ORAL
Status: DISCONTINUED | OUTPATIENT
Start: 2018-06-02 | End: 2018-06-03 | Stop reason: HOSPADM

## 2018-06-02 RX ORDER — ONDANSETRON 4 MG/1
8 TABLET, ORALLY DISINTEGRATING ORAL EVERY 8 HOURS PRN
Status: DISCONTINUED | OUTPATIENT
Start: 2018-06-02 | End: 2018-06-03 | Stop reason: HOSPADM

## 2018-06-02 RX ORDER — LAMOTRIGINE 100 MG/1
150 TABLET ORAL 2 TIMES DAILY
Status: CANCELLED | OUTPATIENT
Start: 2018-06-02

## 2018-06-02 RX ORDER — TRAZODONE HYDROCHLORIDE 50 MG/1
50 TABLET ORAL NIGHTLY PRN
Status: DISCONTINUED | OUTPATIENT
Start: 2018-06-02 | End: 2018-06-03 | Stop reason: HOSPADM

## 2018-06-02 RX ORDER — LAMOTRIGINE 100 MG/1
150 TABLET ORAL 2 TIMES DAILY
Status: DISCONTINUED | OUTPATIENT
Start: 2018-06-02 | End: 2018-06-03 | Stop reason: HOSPADM

## 2018-06-02 RX ORDER — ALUMINA, MAGNESIA, AND SIMETHICONE 2400; 2400; 240 MG/30ML; MG/30ML; MG/30ML
15 SUSPENSION ORAL EVERY 6 HOURS PRN
Status: DISCONTINUED | OUTPATIENT
Start: 2018-06-02 | End: 2018-06-03 | Stop reason: HOSPADM

## 2018-06-02 RX ORDER — ALBUTEROL SULFATE 90 UG/1
2 AEROSOL, METERED RESPIRATORY (INHALATION) EVERY 4 HOURS PRN
Status: CANCELLED | OUTPATIENT
Start: 2018-06-02

## 2018-06-02 RX ORDER — NICOTINE 21 MG/24HR
1 PATCH, TRANSDERMAL 24 HOURS TRANSDERMAL EVERY 24 HOURS
Status: DISCONTINUED | OUTPATIENT
Start: 2018-06-02 | End: 2018-06-02

## 2018-06-02 RX ORDER — ONDANSETRON 4 MG/1
8 TABLET, ORALLY DISINTEGRATING ORAL EVERY 8 HOURS PRN
Status: CANCELLED | OUTPATIENT
Start: 2018-06-02

## 2018-06-02 RX ORDER — BENZTROPINE MESYLATE 1 MG/ML
0.5 INJECTION INTRAMUSCULAR; INTRAVENOUS DAILY PRN
Status: DISCONTINUED | OUTPATIENT
Start: 2018-06-02 | End: 2018-06-03 | Stop reason: HOSPADM

## 2018-06-02 RX ORDER — VARENICLINE TARTRATE 0.5 MG/1
1 TABLET, FILM COATED ORAL 2 TIMES DAILY WITH MEALS
Status: CANCELLED | OUTPATIENT
Start: 2018-06-02 | End: 2018-08-25

## 2018-06-02 RX ORDER — BENZTROPINE MESYLATE 1 MG/1
1 TABLET ORAL DAILY PRN
Status: DISCONTINUED | OUTPATIENT
Start: 2018-06-02 | End: 2018-06-03 | Stop reason: HOSPADM

## 2018-06-02 RX ORDER — VARENICLINE TARTRATE 0.5 MG/1
1 TABLET, FILM COATED ORAL 2 TIMES DAILY WITH MEALS
Status: CANCELLED | OUTPATIENT
Start: 2018-06-02

## 2018-06-02 RX ORDER — ALBUTEROL SULFATE 90 UG/1
2 AEROSOL, METERED RESPIRATORY (INHALATION) EVERY 4 HOURS PRN
Status: DISCONTINUED | OUTPATIENT
Start: 2018-06-02 | End: 2018-06-03 | Stop reason: HOSPADM

## 2018-06-02 RX ORDER — IRBESARTAN 300 MG/1
300 TABLET ORAL NIGHTLY
COMMUNITY
End: 2018-07-17 | Stop reason: SDUPTHER

## 2018-06-02 RX ORDER — ONDANSETRON 4 MG/1
4 TABLET, FILM COATED ORAL EVERY 6 HOURS PRN
Status: DISCONTINUED | OUTPATIENT
Start: 2018-06-02 | End: 2018-06-03 | Stop reason: HOSPADM

## 2018-06-02 RX ORDER — FAMOTIDINE 20 MG/1
20 TABLET, FILM COATED ORAL 2 TIMES DAILY PRN
Status: DISCONTINUED | OUTPATIENT
Start: 2018-06-02 | End: 2018-06-03 | Stop reason: HOSPADM

## 2018-06-02 RX ADMIN — ALUMINUM HYDROXIDE, MAGNESIUM HYDROXIDE, AND DIMETHICONE 15 ML: 400; 400; 40 SUSPENSION ORAL at 03:35

## 2018-06-02 RX ADMIN — LAMOTRIGINE 150 MG: 100 TABLET ORAL at 20:41

## 2018-06-02 RX ADMIN — METOPROLOL SUCCINATE 150 MG: 50 TABLET, FILM COATED, EXTENDED RELEASE ORAL at 20:41
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
## 2018-06-02 NOTE — PLAN OF CARE
Problem: Patient Care Overview  Goal: Individualization and Mutuality   06/02/18 0137 06/02/18 1143 06/02/18 1157   Individualization   Patient Specific Goals (Include Timeframe) --  --  mood stabilization and medication adjustments    Mutuality/Individual Preferences   What Anxieties, Fears, Concerns, or Questions Do You Have About Your Care? Pt reports that she does not want to be her but knows that she needs to be here --  --    What Information Would Help Us Give You More Personalized Care? --  --  NA   How Would You and/or Your Support Person Like to Participate in Your Care? --  --  no answwer    Personal Strengths/Vulnerabilities   Patient Personal Strengths --  ability to maintain sobriety;intellectual cognitive skills;positive educational history;stable living environment;spiritual/Denominational support;motivated for recovery;motivated for treatment;expressive of needs;expressive of emotions;family/social support --    Patient Vulnerabilities --  long history of treatment for bipolar illness and substance abuse  --      Goal: Discharge Needs Assessment   06/02/18 0136 06/02/18 1157   Discharge Needs Assessment   Readmission Within the Last 30 Days --  no previous admission in last 30 days   Concerns to be Addressed --  medication;mental health   Patient/Family Anticipates Transition to other (see comments)  (sober living home) --    Patient/Family Anticipated Services at Transition outpatient care --    Transportation Anticipated public transportation --    Patient's Choice of Community Agency(s) --  Pt lives in a sober living program    Discharge Needs Assessment,    Outpatient/Agency/Support Group Needs --  outpatient substance abuse treatment (specify);outpatient psychiatric care (specify);outpatient medication management;outpatient counseling   Anticipated Discharge Disposition --  (sober living program )     Attempted to meet with patient for initial assessment and treatment planning, patient was in bed  "and was able to answer some questions she had difficulty staying awake for assessment. Chart information indicated the patient had been awake for 48 hrs prior to admission. Completed PSA treatment plan and integrated summary. Discussed treatment objectives and briefly discussed disposition plans.     The patient presented to the ED at [facility] brought in by a friend also referred by her PCP for assessment and evaluation for as the patient describes as  manic symptoms. She reports a recent medication change that she feels like has contributed to the increase in symptoms. Trintellix was increased and she was prescribed Chantix for smoking cessation. She reports feeling like her speech was pressured and pacing around the room. She reports no sleep in the past 48 hrs. She also desribes hypersexual urges and organizing group sex at a \"[facility] with prostitutes\". Patient has been seeking out other medications such as Wellbutrin to further induce her carlos manuel. Patient appears at times to be aware that she is experiencing manic symptoms and at times is irritable and says she likes the feeling.     Treatment team to stabilize patients symptoms, provide individual and group therapy to focus on healthy coping skills and safe disposition planning as well as illness education and schedule follow up care.       "

## 2018-06-02 NOTE — ED NOTES
"[id number]    D: Therapist received call for consult by [facility] staff, JENNY Meyer, MD Allan and VANNA Mitchell.  Clinician met with patient at bedside.  Patient did not require security monitoring during assessment.  Patient is a 37 year old, white female [place].  Patient lives in a sober living home x 2 years, after completing a stay in Residential substance abuse program ([facility]) x 1 year.  Sobriety date is 4/22/15.  Patient is a [place] native, 1 sister.  She is accompanied with her friend, Santa, who is the director of [facility].  Patient asked for Santa to remain in the room during assessment, as the patient appears paranoid and nervous. Patient presents to [facility] today due to feeling as though she is becoming out of control of herself due to what she describes as a manic episode.  Per the patient, she has a standing Dx of Bipolar disorder.  She sees Dr. Amy Roland for PCP, as well as Naima Savage LCSW who is contracted through her recovery program, as outpatient providers.  Patient reports various hospitalizations throughout her life, most recently in September 2017 @ [facility] for suicidal ideations. Nicole states she has had several medications adjustments throughout her life and recently had been doing well on the following through her PCP: Proventil, Avapro, Lamictal, Toprol XL, Nicorette, and Trintellix.  Patient reports she recently within the last 2 weeks went to her PCP and Dr. Roland increased her Trintellix from 10MG to 15 MG, also started her on Chantix for smoking cessation. Patient reports that she started experiencing symptoms of being \"thrown into a manic episode.\"  She is notably pacing around the room, fidgety hands and feet, pressured speech.  Nicole describes her mood as \"I feel like a peanut M&M, I have my shell but my insides are melting everywhere.\"  Nicole goes on to describe experiencing hypersexual thoughts, urges, and desires, as well as elevated mood and feelings " "of \"being on top of the world, like I want to get higher but I can't, and if I do, it's going to be bad for me.\" She goes on to describe several concerning behaviors that she has engaged in such as: hypersexual urges, calling and organizing group sex at a \" and [employer] house\" with prostitues, also searching for medication prescriptions to help induce further manic symptoms.  Nicole states in the past she had a bad reaction to Prozac, which induced feelings of carlos manuel, so she was searching for Prozac to further elevate her mood because of how good she feels currently.  She was also found by the director of her program, Santa, to have been asking for Wellbutrin to take to also induce further the feeling of carlos manuel. Per Santa, the patient has not slept in 48 hours.  Her appetite appears fair, she has ate 2 sandwich lunch bags here in ER.  She is slightly irritable with me, stating she enjoyed the feeling of carlos manuel but knows if she doesn't get help now, it will be \"bad for me.\"  She states, \"I need to go somewhere to have them adjust my medications and watch me so it is done carefully and nothing bad happens.\"  Santa reports when she noticed the patient medication seeking to further induce her carlos manuel, the patient reportedly handed over her medications to Santa and asked to be see by her PCP immediately.  They presented to Dr. Roland's office, who recommended the patient present to [facility] ER and recommended evaluation for psych admission.  She denies AVH/SI/HI.    A: Upon arrival, patient is alert and oriented x 4.  Patient denies any visual/audio/tactile hallucinations and does not appear to be experiencing any perceptual disturbances or responding to internal stimuli.  The patient presents with an anxious affect, mood is irritable at times.  Patient’s appearance is well groomed.  Patient is minimally cooperative with assessment.  Eye contact appears poor.  Speech is slightly pressured, although clear and organized.  I do not " notice flight of ideas quite yet but definitely some distractibility as far as engaging in assessment, she was minimally engaged. Cognitions appear to be rational, although hypersexual and somewhat paranoid, is concerned about me asking so many questions, is irritable at me for asking, etc.  The patient’s body language appears anxious, fidgety, with increase of motor impairments as she does pace the room. The patient denies any SI. CSSRS administered for suicide risk.  Results suggest the patient is denying any suicidal ideations or intention.  Patient denies any violent thoughts, actions, or homicidal ideations.  Insight and judgement appear fair as she is self aware of how she is feeling, able to identify the fact she feels compelled to induce her carlos manuel further because she likes the feeling, although can identify this is dangerous behavior and is seeking help.  In regards to substance abuse, the patient denies any etoh or illicit drug use.  Patient’s Utox is suggestively negative for all substances, reports hx of drug and etoh use with sobriety date of 4/22/15.     P: Given the above clinical assessment, I recommend a referral be sent for the psychiatrist to review for potential admission.  Patient is agreeable for referral to [facility].  I staffed this case with [facility] medical treatment team, JENNY Meyer, MD Allan,  And VANNA Mitchell.  All parties agreeable to moving forward with plan.  I faxed the appropriate clinical paperwork to [facility], which I confirmed was received by Judy Lincoln RN.  The patient was accepted to [facility] by Dr. Gilbert Lopez. [facility] was contacted for transportation, reporting an ETA of 2330 to [facility].  I provided this information to the treatment team, gave VANNA Mitchell info to facilitate calling report.  All parties aware and agreeable to patient disposition.      -DEENA Young.     DEENA Mac  06/01/18 2334       DEENA Mac  06/01/18  2226

## 2018-06-02 NOTE — ED NOTES
Latonia, Lead RN @ [facility] asked question regarding blood pressure. Facilitated information to JEREMI Mitchell RN.  Per VANNA Mitchell she will address.    -DEENA Young LPCA  06/02/18 0006

## 2018-06-02 NOTE — PLAN OF CARE
Problem: Patient Care Overview  Goal: Plan of Care Review   06/02/18 3588   Coping/Psychosocial   Patient Agreement with Plan of Care agrees   Plan of Care Review   Progress no change   Coping/Psychosocial   Plan of Care Reviewed With patient   OTHER   Outcome Summary Patient calm and cooperative. Rated anxiety and depression a 0 No HI/SI noted.. Will observe for changes and follow up as needed.

## 2018-06-02 NOTE — PLAN OF CARE
Problem: Patient Care Overview  Goal: Plan of Care Review  Outcome: Ongoing (interventions implemented as appropriate)  Admitted to floor from [facility]. Pt. Apparently in manic episode with poor sleep, hypersexuality. Pt. denies anxiety and depression and S.I. and is minimizing her symptoms. Did say he enjoyed being manic and knew she would get in trouble if she didn't get her meds adjusted. Pt. cooperative with admission procedures, but was anxious and restless   06/02/18 9885   Coping/Psychosocial   Patient Agreement with Plan of Care agrees   Plan of Care Review   Progress no change   Coping/Psychosocial   Plan of Care Reviewed With patient   . Refused Vistaril or Trazodone, stating he caused her to crave. Has had no drugs or alcohol for three years.    Problem: Overarching Goals (Adult)  Goal: Adheres to Safety Considerations for Self and Others  Outcome: Ongoing (interventions implemented as appropriate)    Goal: Optimized Coping Skills in Response to Life Stressors  Outcome: Ongoing (interventions implemented as appropriate)    Goal: Develops/Participates in Therapeutic [place] to Support Successful Transition  Outcome: Ongoing (interventions implemented as appropriate)

## 2018-06-03 VITALS
WEIGHT: 257.8 LBS | HEIGHT: 67 IN | DIASTOLIC BLOOD PRESSURE: 91 MMHG | TEMPERATURE: 97.9 F | OXYGEN SATURATION: 100 % | RESPIRATION RATE: 18 BRPM | BODY MASS INDEX: 40.46 KG/M2 | HEART RATE: 67 BPM | SYSTOLIC BLOOD PRESSURE: 136 MMHG

## 2018-06-03 PROCEDURE — 99238 HOSP IP/OBS DSCHRG MGMT 30/<: CPT | Performed by: PSYCHIATRY & NEUROLOGY

## 2018-06-03 RX ADMIN — LAMOTRIGINE 150 MG: 100 TABLET ORAL at 08:39

## 2018-06-03 NOTE — PROGRESS NOTES
"      Inpatient Psy Progress Note   Clinician: Gilbert Lopez MD  Admission Date: 6/2/2018  12:17 PM 06/03/18    Behavioral Health Treatment Plan and Problem List: I have reviewed and approved the Behavioral Health Treatment Plan and Problem list.    Allergies  Allergies   Allergen Reactions   • Celexa [Citalopram Hydrobromide] Delirium     Caused pt to me manic   • Clozapine Mental Status Change   • Haloperidol And Related Anaphylaxis   • Abilify [Aripiprazole] Mental Status Change   • Sulfa Antibiotics Dermatitis     Blisters in mouth   • Lisinopril    • Omeprazole    • Prozac [Fluoxetine Hcl]    • Clonidine Derivatives Other (See Comments)     Used to intentionally overdose.    • Nifedipine Other (See Comments)     headahce   • Propranolol Other (See Comments)     Used to intentionally overdose.        Hospital Day: 1 day      Assessment completed within view of staff    History  CC: inpatient followup  Interval HPI: Patient seen and evaluated by me.  Chart reviewed. Patient slept well last night.  Her mood is euthymic this morning.  She is not demonstrating any signs of carlos manuel or hypomania today.  Insight is intact.  No signs of psychosis.  Denies AVH.  Denies SI/HI.  She is no longer experiencing any sexual urges or impulses for risky behavior(s).      Interval Review of Systems:   General ROS: negative for - fever or malaise  Endocrine ROS: negative for - palpitations  Respiratory ROS: no cough, shortness of breath, or wheezing  Cardiovascular ROS: no chest pain or dyspnea on exertion  Gastrointestinal ROS: no abdominal pain,no black or bloody stools    /91 (BP Location: Right arm, Patient Position: Sitting)   Pulse 67   Temp 97.9 °F (36.6 °C) (Temporal Artery )   Resp 18   Ht 170.2 cm (67\")   Wt 117 kg (257 lb 12.8 oz)   SpO2 100%   BMI 40.38 kg/m²     Mental Status Exam  Mood: euthymic  Affect: normal   Thought Processes: linear, logical, and goal directed  Thought Content: " normal  Hallucinations: no  Suicidal Thoughts: denies  Suicidal Plan/Intent:denies  Hopelesness:no  Homicidal Thoughts:  absent      Medical Decision Making:   Labs:     Lab Results (last 24 hours)     ** No results found for the last 24 hours. **            Radiology:     Imaging Results (last 24 hours)     ** No results found for the last 24 hours. **            EKG:     ECG/EMG Results (most recent)     None           Medications:     lamoTRIgine 150 mg Oral BID   losartan 100 mg Oral Q24H   metoprolol succinate  mg Oral Q24H          All medications reviewed.      Assessment and Plan:    Active Problems:    Bipolar I disorder  Continue lamotrigine 150mg BID.  I have advised the patient that to treatment would involve adding another medication to protect against future episodes of carlos manuel and discussed with her numerous options.  However she has declined and voices a full understanding of the risks inherent in that decision.    At this time she is not demonstrating any signs of carlos manuel or hypomania.  She is requesting to leave.  She does not meet criteria for a hold against her will today.  She plans to follow-up with her therapist at [facility].  I have encouraged her to locate a psychiatrist in her local area and initiate care so that her psychiatric medication can be managed by a psychiatrist rather than primary care.  We will also provide her with a list of outpatient psychiatric resources.

## 2018-06-03 NOTE — PLAN OF CARE
Problem: Patient Care Overview  Goal: Plan of Care Review  Outcome: Ongoing (interventions implemented as appropriate)  Denies anxiety, depression, S.I. Out of room during evening shift and interacted some with her peers. Is aloof and doesn't have much to say to staff. Appeared to sleep well this shift.   06/03/18 0546   Coping/Psychosocial   Patient Agreement with Plan of Care agrees   Plan of Care Review   Progress no change   Coping/Psychosocial   Plan of Care Reviewed With patient       Problem: Overarching Goals (Adult)  Goal: Adheres to Safety Considerations for Self and Others  Outcome: Ongoing (interventions implemented as appropriate)    Goal: Optimized Coping Skills in Response to Life Stressors  Outcome: Ongoing (interventions implemented as appropriate)    Goal: Develops/Participates in Therapeutic [place] to Support Successful Transition  Outcome: Ongoing (interventions implemented as appropriate)

## 2018-06-03 NOTE — DISCHARGE SUMMARY
"  Date of Discharge:  6/3/2018    Discharge Diagnosis:Active Problems:    Bipolar I disorder with carlos manuel        Presenting Problem/History of Present Illness:  Nicole Altman is a 37 y.o. female who was admitted on 6/2/2018 for safety precautions and stabilization.  Patient was presented to [facility] through a direct admit from [facility] for concerns about her elevated mood and changes in her behavior.  Patient reports that she has medications changes Trentellix was increased about 2 weeks ago and Chantix was started 8 days ago.  Patient reports \"I feel like a peanut M&M, and I have a hard shell but my insides are a melty mess\" Patient yells and smacks hands together.  Patient denies anxiety and depression. Reports sleep and appetite are good Nicole goes on to describe experiencing hypersexual thoughts, urges, and desires, as well as elevated mood and feelings of \"being on top of the world, like I want to get higher but I can't, and if I do, it's going to be bad for me.\"  She goes on to describe several concerning behaviors: hypersexual urges, calling and organizing group sex at a \" and S&M house\" with prostitutes, also searching for medication prescriptions to help induce further manic symptoms.    She has a history of carlos manuel induced by other antidepressants in the past, including prozac and celexa.  She has experienced carlos manuel in the past that were NOT induced by antidepressants.   She states that she was having her PCP prescribe for bipolar disorder because nothing that she is taken for depression in the past has helped.  She's been depressed in the recent past weeks/months.  She is tried and failed the following medications for bipolar depression:  Seroquel - weight gain, daytime sleepiness  Zyprexa - same thing  Latuda - \"knocked out\"  Lithium - side effects  She has not tried - Vyrlar    Hospital Course:  Patient was admitted for safety and stabilization and was placed on " standard precautions.  Routine labs were checked.  Patient was assigned a masters level therapist and provided with an opportunity to participate in group and individual therapy on the unit.  We stopped the Trintellix and Chantix immediately. Continued her home dose of lamotrigine 150mg BID.   Within 24 hours as stopping the printout and Chantix signs and symptoms of hypomania and carlos manuel resolved.  Patient had developed good insight and was not demonstrating risk-taking behaviors on the unit.  The night before discharge she slept through the night.  Her energy level was within normal limits.  Her mood was euthymic (not elevated or irritable).  She was not demonstrating any signs of psychosis.  Judgment was intact along with her insight.    I advised the patient that optimal treatment would involve adding another medication to protect against future episodes of carlos manuel and discussed with her numerous options.  However she has declined and voiced a full understanding of the risks inherent in that decision.  On 6/3/18 she requested to leave.  At that time she not meet criteria for a hold against her will. She planed to follow-up with her therapist at [facility].   We also provided her with a list of outpatient psychiatric resources.      Consults:   Consults     No orders found for last 30 day(s).          Labs:  Lab Results (all)     None          Imaging:  Imaging Results (all)     None                  Condition on Discharge:  improved    Vital Signs  Temp:  [97 °F (36.1 °C)-97.9 °F (36.6 °C)] 97.9 °F (36.6 °C)  Heart Rate:  [67-81] 67  Resp:  [18] 18  BP: (136-171)/(84-92) 136/91    Discharge Disposition  Home or Self Care    Discharge Medications   Nicole Altman   Home Medication Instructions WILLIAM:[id number]    Printed on:06/03/18 1228   Medication Information                      albuterol (PROVENTIL HFA;VENTOLIN HFA) 108 (90 Base) MCG/ACT inhaler  Inhale 2 puffs Every 4 (Four) Hours As Needed for Wheezing or  Shortness of Air.             albuterol (PROVENTIL) (2.5 MG/3ML) 0.083% nebulizer solution  Take 2.5 mg by nebulization Every 4 (Four) Hours As Needed for Wheezing.             irbesartan (AVAPRO) 300 MG tablet  Take 300 mg by mouth Every Night.             lamoTRIgine (LaMICtal) 150 MG tablet  Take 1 tablet by mouth 2 (Two) Times a Day.             metoprolol succinate XL (TOPROL-XL) 100 MG 24 hr tablet  Take 150 mg by mouth Every Night.             ondansetron ODT (ZOFRAN ODT) 8 MG disintegrating tablet  Dissolve 1/2 to 1 tablet SL every 8 hours as needed for nausea.                 Discharge Diet: regular     Activity at Discharge: as tolerated    Follow-up Appointments:    Future Appointments  Date Time Provider Department Center   6/20/2018 1:50 PM Lakshmi Zeng MD [facility] None   6/21/2018 3:15 PM PATRICIA Geiger [facility] None   8/21/2018 1:00 PM YOANA Marshall N [facility] None         Gilbert Lopez MD  06/03/18  12:28 PM

## 2018-06-05 ENCOUNTER — OFFICE VISIT (OUTPATIENT)
Dept: INTERNAL MEDICINE | Facility: CLINIC | Age: 38
End: 2018-06-05

## 2018-06-05 VITALS
HEIGHT: 67 IN | TEMPERATURE: 98.6 F | OXYGEN SATURATION: 100 % | DIASTOLIC BLOOD PRESSURE: 90 MMHG | BODY MASS INDEX: 40.49 KG/M2 | WEIGHT: 258 LBS | SYSTOLIC BLOOD PRESSURE: 146 MMHG | HEART RATE: 105 BPM

## 2018-06-05 DIAGNOSIS — F31.10 BIPOLAR I DISORDER WITH MANIA (HCC): ICD-10-CM

## 2018-06-05 DIAGNOSIS — K21.9 GASTROESOPHAGEAL REFLUX DISEASE, ESOPHAGITIS PRESENCE NOT SPECIFIED: ICD-10-CM

## 2018-06-05 DIAGNOSIS — I10 UNCONTROLLED HYPERTENSION: Primary | ICD-10-CM

## 2018-06-05 PROCEDURE — 99214 OFFICE O/P EST MOD 30 MIN: CPT | Performed by: PHYSICIAN ASSISTANT

## 2018-06-05 RX ORDER — RANITIDINE 150 MG/1
150 TABLET ORAL 2 TIMES DAILY PRN
Qty: 60 TABLET | Refills: 11 | Status: SHIPPED | OUTPATIENT
Start: 2018-06-05 | End: 2019-07-05 | Stop reason: SDUPTHER

## 2018-06-05 RX ORDER — METOPROLOL SUCCINATE 100 MG/1
150 TABLET, EXTENDED RELEASE ORAL NIGHTLY
Qty: 45 TABLET | Refills: 5 | Status: SHIPPED | OUTPATIENT
Start: 2018-06-05 | End: 2018-07-05

## 2018-06-05 RX ORDER — METOPROLOL SUCCINATE 200 MG/1
200 TABLET, EXTENDED RELEASE ORAL NIGHTLY
Qty: 30 TABLET | Refills: 5 | Status: SHIPPED | OUTPATIENT
Start: 2018-06-05 | End: 2018-06-05 | Stop reason: SDUPTHER

## 2018-06-05 NOTE — PROGRESS NOTES
Nicole Altman is a 37 y.o. female.     Subjective   History of Present Illness   Here today for follow up from hospital admission. On 6/1/18 she was sent from our office to [facility] ED due to symptoms of hypomania. She was then direct admitted to [facility] for safety precautions and stabilization where she stayed from 6/2-6/3/18.  It was decided that hypomania was induced by Trintellix which was discontinued. She was also treated with Chantix at the time which was discontinued but was not thought to continuted to mental status change.  She was treated with Lamictal during admission which worked well to return her to a euthymic mood. She was discharged on Lamictal 150 mg and admits she has missed a few doses due to not feeling she needs it at the time, despite knowing she does. She currently denies SI, HI or sleep disturbances. She is no longer experiencing hypersexual thoughts, urges, and desires, as well as elevated mood and feelings which were present on admission. Inpatient psychiatrist recommended Vraylar if needed in the future to treat Bipolar disorder.           The following portions of the patient's history were reviewed and updated as appropriate: allergies, current medications, past family history, past medical history, past social history, past surgical history and problem list.    Review of Systems    Constitutional: Negative for appetite change, chills, fatigue, fever and unexpected weight change.   HENT: Negative for congestion, ear pain, hearing loss, nosebleeds, postnasal drip, rhinorrhea, sore throat, tinnitus and trouble swallowing.    Eyes: Negative for photophobia, discharge and visual disturbance.   Respiratory: Negative for cough, chest tightness, shortness of breath and wheezing.    Cardiovascular: Negative for chest pain, palpitations and leg swelling.   Gastrointestinal: Negative for abdominal distention, abdominal pain, blood in stool, constipation, diarrhea, nausea and vomiting.  "  Endocrine: Negative for cold intolerance, heat intolerance, polydipsia, polyphagia and polyuria.   Musculoskeletal: Negative for arthralgias, back pain, joint swelling, myalgias, neck pain and neck stiffness.   Skin: Negative for color change, pallor, rash and wound.   Allergic/Immunologic: Negative for environmental allergies, food allergies and immunocompromised state.   Neurological: Negative for dizziness, tremors, seizures, weakness, numbness and headaches.   Hematological: Negative for adenopathy. Does not bruise/bleed easily.   Psychiatric/Behavioral: hypersexual thoughts, urges, and desires.  Elevated mood and feelings. Negative for sleep disturbances, agitation, behavioral problems, confusion, hallucinations, self-injury and suicidal ideas. The patient is not nervous/anxious.      Objective    Physical Exam  Constitutional: Oriented to person, place, and time. Appears well-developed and well-nourished.   HENT: OP normal.   Head: Normocephalic and atraumatic.   Eyes: EOM are normal. Pupils are equal, round, and reactive to light.   Neck: Normal range of motion. Neck supple.   Cardiovascular: Normal rate, regular rhythm and normal heart sounds.    Pulmonary/Chest: Effort normal and breath sounds normal. No respiratory distress.  Has no wheezes or rales. Exhibits no chest wall tenderness.   Abdominal: Soft. Bowel sounds are normal. Exhibits no distension and no mass. There is no tenderness.   Musculoskeletal: Normal range of motion. Exhibits no tenderness.   Neurological: Alert and oriented to person, place, and time.   Skin: Skin is warm and dry.   Psychiatric: Has a normal mood and affect. Behavior is normal. Judgment and thought content normal.       /90   Pulse 105   Temp 98.6 °F (37 °C)   Ht 170.2 cm (67.01\")   Wt 117 kg (258 lb)   SpO2 100%   BMI 40.40 kg/m²     Nursing note and vitals reviewed.        Assessment/Plan   Nicole was seen today for follow-up.    Diagnoses and all orders for " this visit:        Bipolar I disorder with carlos manuel  -     Oral Medication Containers (AM/PM PILL BOX) misc; 1 each 1 (One) Time for 1 dose.  Strongly encouraged continue use of Lamictal 150 mg twice daily. Encouraged better compliance with medications using a morning and evening pill box.         Admission record reviewed. Current outpatient and discharge medications have been reconciled for the patient.  Reviewed by: PATRICIA Geiger

## 2018-06-06 ENCOUNTER — HOSPITAL ENCOUNTER (EMERGENCY)
Facility: HOSPITAL | Age: 38
Discharge: HOME | End: 2018-06-06
Attending: EMERGENCY MEDICINE | Admitting: STUDENT IN AN ORGANIZED HEALTH CARE EDUCATION/TRAINING PROGRAM

## 2018-06-06 VITALS
HEIGHT: 67 IN | DIASTOLIC BLOOD PRESSURE: 79 MMHG | BODY MASS INDEX: 40.65 KG/M2 | OXYGEN SATURATION: 98 % | TEMPERATURE: 97.3 F | SYSTOLIC BLOOD PRESSURE: 142 MMHG | HEART RATE: 76 BPM | RESPIRATION RATE: 18 BRPM | WEIGHT: 259 LBS

## 2018-06-06 DIAGNOSIS — G43.009 MIGRAINE WITHOUT AURA AND WITHOUT STATUS MIGRAINOSUS, NOT INTRACTABLE: Primary | ICD-10-CM

## 2018-06-06 PROCEDURE — 25010000002 KETOROLAC TROMETHAMINE PER 15 MG: Performed by: PHYSICIAN ASSISTANT

## 2018-06-06 PROCEDURE — 25010000002 DIPHENHYDRAMINE PER 50 MG: Performed by: PHYSICIAN ASSISTANT

## 2018-06-06 PROCEDURE — 99283 EMERGENCY DEPT VISIT LOW MDM: CPT

## 2018-06-06 PROCEDURE — 25010000002 METOCLOPRAMIDE PER 10 MG: Performed by: PHYSICIAN ASSISTANT

## 2018-06-06 PROCEDURE — 96372 THER/PROPH/DIAG INJ SC/IM: CPT

## 2018-06-06 RX ORDER — METOCLOPRAMIDE HYDROCHLORIDE 5 MG/ML
10 INJECTION INTRAMUSCULAR; INTRAVENOUS ONCE
Status: COMPLETED | OUTPATIENT
Start: 2018-06-06 | End: 2018-06-06

## 2018-06-06 RX ORDER — ONDANSETRON 4 MG/1
4 TABLET, ORALLY DISINTEGRATING ORAL ONCE
Status: COMPLETED | OUTPATIENT
Start: 2018-06-06 | End: 2018-06-06

## 2018-06-06 RX ORDER — DIPHENHYDRAMINE HYDROCHLORIDE 50 MG/ML
50 INJECTION INTRAMUSCULAR; INTRAVENOUS ONCE
Status: COMPLETED | OUTPATIENT
Start: 2018-06-06 | End: 2018-06-06

## 2018-06-06 RX ORDER — KETOROLAC TROMETHAMINE 30 MG/ML
60 INJECTION, SOLUTION INTRAMUSCULAR; INTRAVENOUS ONCE
Status: COMPLETED | OUTPATIENT
Start: 2018-06-06 | End: 2018-06-06

## 2018-06-06 RX ORDER — SUMATRIPTAN 50 MG/1
50 TABLET, FILM COATED ORAL
Qty: 12 TABLET | Refills: 0 | Status: SHIPPED | OUTPATIENT
Start: 2018-06-06 | End: 2018-06-12

## 2018-06-06 RX ADMIN — METOCLOPRAMIDE 10 MG: 5 INJECTION, SOLUTION INTRAMUSCULAR; INTRAVENOUS at 20:50

## 2018-06-06 RX ADMIN — DIPHENHYDRAMINE HYDROCHLORIDE 50 MG: 50 INJECTION, SOLUTION INTRAMUSCULAR; INTRAVENOUS at 20:50

## 2018-06-06 RX ADMIN — ONDANSETRON 4 MG: 4 TABLET, ORALLY DISINTEGRATING ORAL at 19:34

## 2018-06-06 RX ADMIN — KETOROLAC TROMETHAMINE 60 MG: 30 INJECTION, SOLUTION INTRAMUSCULAR at 19:34
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
## 2018-06-07 NOTE — DISCHARGE INSTRUCTIONS
Return to the ER for markedly worsening headache, new or worsening symptoms.    Follow-up with Dr. House, neurologist, and your family doctor soon as possible for further workup, treatment and evaluation.  Call for appointments.

## 2018-06-11 ENCOUNTER — TELEPHONE (OUTPATIENT)
Dept: NEUROLOGY | Facility: CLINIC | Age: 38
End: 2018-06-11

## 2018-06-11 DIAGNOSIS — F31.10 BIPOLAR DISORDER, MOST RECENT EPISODE MANIC (HCC): Primary | ICD-10-CM

## 2018-06-11 NOTE — TELEPHONE ENCOUNTER
Pt emailed us via [employer] and stated that she was in the ER on Wednesday with the worst migraines she's ever had and was given imitrex. Pt wanted to know if we could prescribe imitrex as well as give her a steroid pack. Please advise.

## 2018-06-12 RX ORDER — SUMATRIPTAN 50 MG/1
50 TABLET, FILM COATED ORAL
Qty: 9 TABLET | Refills: 4 | Status: SHIPPED | OUTPATIENT
Start: 2018-06-12 | End: 2018-07-12

## 2018-06-12 RX ORDER — METHYLPREDNISOLONE 4 MG/1
TABLET ORAL
Qty: 1 EACH | Refills: 0 | Status: SHIPPED | OUTPATIENT
Start: 2018-06-12 | End: 2018-06-15

## 2018-06-12 NOTE — TELEPHONE ENCOUNTER
Please notify patient a new prescription for Imitrex has been written and sent to the pharmacy as well as a Medrol dose pack.  Have patient keep return to clinic appointment.

## 2018-06-15 ENCOUNTER — OFFICE VISIT (OUTPATIENT)
Dept: INTERNAL MEDICINE | Facility: CLINIC | Age: 38
End: 2018-06-15

## 2018-06-15 ENCOUNTER — HOSPITAL ENCOUNTER (OUTPATIENT)
Dept: GENERAL RADIOLOGY | Facility: HOSPITAL | Age: 38
Discharge: HOME | End: 2018-06-15
Admitting: PHYSICIAN ASSISTANT

## 2018-06-15 VITALS
HEIGHT: 67 IN | WEIGHT: 259 LBS | OXYGEN SATURATION: 98 % | TEMPERATURE: 98.4 F | HEART RATE: 103 BPM | DIASTOLIC BLOOD PRESSURE: 100 MMHG | SYSTOLIC BLOOD PRESSURE: 148 MMHG | BODY MASS INDEX: 40.65 KG/M2

## 2018-06-15 DIAGNOSIS — Z72.0 TOBACCO USE: ICD-10-CM

## 2018-06-15 DIAGNOSIS — F31.10 BIPOLAR I DISORDER WITH MANIA (HCC): ICD-10-CM

## 2018-06-15 DIAGNOSIS — M25.531 ACUTE PAIN OF RIGHT WRIST: ICD-10-CM

## 2018-06-15 DIAGNOSIS — Z01.818 PREOPERATIVE CLEARANCE: Primary | ICD-10-CM

## 2018-06-15 DIAGNOSIS — I10 ESSENTIAL HYPERTENSION: ICD-10-CM

## 2018-06-15 DIAGNOSIS — M79.644 FINGER PAIN, RIGHT: ICD-10-CM

## 2018-06-15 LAB
ALBUMIN SERPL-MCNC: 3.6 G/DL (ref 3.5–5)
ALBUMIN/GLOB SERPL: 1.2 G/DL (ref 1–2)
ALP SERPL-CCNC: 99 U/L (ref 38–126)
ALT SERPL-CCNC: 20 U/L (ref 13–69)
AST SERPL-CCNC: 15 U/L (ref 15–46)
BASOPHILS # BLD AUTO: 0.04 10*3/MM3 (ref 0–0.2)
BASOPHILS NFR BLD AUTO: 0.4 % (ref 0–2.5)
BILIRUB SERPL-MCNC: 0.6 MG/DL (ref 0.2–1.3)
BUN SERPL-MCNC: 15 MG/DL (ref 7–20)
BUN/CREAT SERPL: 18.8 (ref 7.1–23.5)
CALCIUM SERPL-MCNC: 9.4 MG/DL (ref 8.4–10.2)
CHLORIDE SERPL-SCNC: 107 MMOL/L (ref 98–107)
CO2 SERPL-SCNC: 22 MMOL/L (ref 26–30)
CREAT SERPL-MCNC: 0.8 MG/DL (ref 0.6–1.3)
EOSINOPHIL # BLD AUTO: 0.26 10*3/MM3 (ref 0–0.7)
EOSINOPHIL NFR BLD AUTO: 2.3 % (ref 0–7)
ERYTHROCYTE [DISTWIDTH] IN BLOOD BY AUTOMATED COUNT: 13.7 % (ref 11.5–14.5)
GFR SERPLBLD CREATININE-BSD FMLA CKD-EPI: 80 ML/MIN/1.73
GFR SERPLBLD CREATININE-BSD FMLA CKD-EPI: 97 ML/MIN/1.73
GLOBULIN SER CALC-MCNC: 2.9 GM/DL
GLUCOSE SERPL-MCNC: 91 MG/DL (ref 74–98)
HCT VFR BLD AUTO: 41.5 % (ref 37–47)
HGB BLD-MCNC: 13.6 G/DL (ref 12–16)
IMM GRANULOCYTES # BLD: 0.04 10*3/MM3 (ref 0–0.06)
IMM GRANULOCYTES NFR BLD: 0.4 % (ref 0–0.6)
LYMPHOCYTES # BLD AUTO: 1.7 10*3/MM3 (ref 0.6–3.4)
LYMPHOCYTES NFR BLD AUTO: 15.3 % (ref 10–50)
MCH RBC QN AUTO: 28.8 PG (ref 27–31)
MCHC RBC AUTO-ENTMCNC: 32.8 G/DL (ref 30–37)
MCV RBC AUTO: 87.7 FL (ref 81–99)
MONOCYTES # BLD AUTO: 0.62 10*3/MM3 (ref 0–0.9)
MONOCYTES NFR BLD AUTO: 5.6 % (ref 0–12)
NEUTROPHILS # BLD AUTO: 8.42 10*3/MM3 (ref 2–6.9)
NEUTROPHILS NFR BLD AUTO: 76 % (ref 37–80)
NRBC BLD AUTO-RTO: 0 /100 WBC (ref 0–0)
PLATELET # BLD AUTO: 267 10*3/MM3 (ref 130–400)
POTASSIUM SERPL-SCNC: 4.4 MMOL/L (ref 3.5–5.1)
PROT SERPL-MCNC: 6.5 G/DL (ref 6.3–8.2)
RBC # BLD AUTO: 4.73 10*6/MM3 (ref 4.2–5.4)
SODIUM SERPL-SCNC: 137 MMOL/L (ref 137–145)
TSH SERPL DL<=0.005 MIU/L-ACNC: 0.51 MIU/ML (ref 0.47–4.68)
WBC # BLD AUTO: 11.08 10*3/MM3 (ref 4.8–10.8)

## 2018-06-15 PROCEDURE — 99214 OFFICE O/P EST MOD 30 MIN: CPT | Performed by: PHYSICIAN ASSISTANT

## 2018-06-15 PROCEDURE — 73130 X-RAY EXAM OF HAND: CPT

## 2018-06-15 PROCEDURE — 73110 X-RAY EXAM OF WRIST: CPT

## 2018-06-15 RX ORDER — ESTRADIOL 0.05 MG/D
1 PATCH TRANSDERMAL WEEKLY
COMMUNITY
End: 2019-03-22 | Stop reason: ALTCHOICE

## 2018-06-15 NOTE — PROGRESS NOTES
"Subjective   Nicole Altman is a 37 y.o. female who presents to the office today for a preoperative consultation at the request of Dr. Crow Cárdenas at [facility] who plans on performing Electroconvulsive Therapy beginning next week, pending insurance approval.  The patient has the following known anesthesia issues: none. Patients bleeding risk: no recent abnormal bleeding, no remote history of abnormal bleeding and no use of Ca-channel blockers. Patient does not have objections to receiving blood products if needed.      The following portions of the patient's history were reviewed and updated as appropriate: allergies, current medications, past family history, past medical history, past social history, past surgical history and problem list.    Review of Systems  A comprehensive review of systems was negative except for: Musculoskeletal: positive for right 5th finger pain from hitting a wall today  Behavioral/Psych: positive for bipolar, depression and tobacco use    Objective   /100   Pulse 103   Temp 98.4 °F (36.9 °C)   Ht 170.2 cm (67.01\")   Wt 117 kg (259 lb)   SpO2 98%   BMI 40.56 kg/m²     General Appearance:    Alert, cooperative, no distress, appears stated age   Head:    Normocephalic, without obvious abnormality, atraumatic   Eyes:    PERRL, conjunctiva/corneas clear, EOM's intact, fundi     benign, both eyes   Ears:    Normal TM's and external ear canals, both ears   Nose:   Nares normal, septum midline, mucosa normal, no drainage    or sinus tenderness   Throat:   Lips, mucosa, and tongue normal; teeth and gums normal   Neck:   Supple, symmetrical, trachea midline, no adenopathy;     thyroid:  no enlargement/tenderness/nodules; no carotid    bruit or JVD   Back:     Symmetric, no curvature, ROM normal, no CVA tenderness   Lungs:     Clear to auscultation bilaterally, respirations unlabored   Chest Wall:    No tenderness or deformity    Heart:    Regular rate and rhythm, S1 and S2 normal, " no murmur, rub   or gallop   Breast Exam:    No tenderness, masses, or nipple abnormality   Abdomen:     Soft, non-tender, bowel sounds active all four quadrants,     no masses, no organomegaly   Extremities:   Right wrist edema over ulna and 5th MCP with bruising. no cyanosis. ROM of 5th finger limited. Wrist ROM normal.   Pulses:   2+ and symmetric all extremities   Skin:   2 mm laceration right hand near 5th MCP joint. Skin color,   texture, turgor normal, no rashes.   Lymph nodes:   Cervical, supraclavicular, and axillary nodes normal   Neurologic:   CNII-XII intact, normal strength, sensation and reflexes     throughout       Predictors of intubation difficulty:   Morbid obesity? BMI 40   Anatomically abnormal facies? no   Prominent incisors? no   Receding mandible? no   Short, thick neck? no   Neck range of motion: normal    Cardiographics  See EKG from 6/1/18 at [facility] ED.     Labs:   Lab Results   Component Value Date    GLUCOSE 136 (H) 06/01/2018    BUN 15 06/15/2018    CREATININE 0.80 06/15/2018    EGFRIFNONA 80 06/15/2018    EGFRIFAFRI 97 06/15/2018    BCR 18.8 06/15/2018    K 4.4 06/15/2018    CO2 22.0 (L) 06/15/2018    CALCIUM 9.4 06/15/2018    PROTENTOTREF 6.5 06/15/2018    ALBUMIN 3.60 06/15/2018    LABIL2 1.2 06/15/2018    AST 15 06/15/2018    ALT 20 06/15/2018     Lab Results   Component Value Date    WBC 16.53 (H) 06/01/2018    HGB 13.6 06/15/2018    HCT 41.5 06/15/2018    MCV 87.7 06/15/2018     06/15/2018     Lab Results   Component Value Date    TSH 0.513 06/15/2018         Assessment/Plan   37 y.o. female with planned surgery as above.    Known risk factors for perioperative complications: None    Difficulty with intubation is not anticipated.    Cardiac Risk Estimation: low    Current medications which may produce withdrawal symptoms if withheld perioperatively: none    Cleared for ECT from primary care standpoint.

## 2018-06-17 ENCOUNTER — HOSPITAL ENCOUNTER (EMERGENCY)
Facility: HOSPITAL | Age: 38
Discharge: HOME | End: 2018-06-17
Attending: STUDENT IN AN ORGANIZED HEALTH CARE EDUCATION/TRAINING PROGRAM | Admitting: STUDENT IN AN ORGANIZED HEALTH CARE EDUCATION/TRAINING PROGRAM

## 2018-06-17 VITALS
OXYGEN SATURATION: 97 % | TEMPERATURE: 98.8 F | HEART RATE: 88 BPM | RESPIRATION RATE: 17 BRPM | BODY MASS INDEX: 40.3 KG/M2 | HEIGHT: 67 IN | DIASTOLIC BLOOD PRESSURE: 100 MMHG | WEIGHT: 256.8 LBS | SYSTOLIC BLOOD PRESSURE: 143 MMHG

## 2018-06-17 DIAGNOSIS — S62.646A CLOSED NONDISPLACED FRACTURE OF PROXIMAL PHALANX OF RIGHT LITTLE FINGER, INITIAL ENCOUNTER: Primary | ICD-10-CM

## 2018-06-17 PROCEDURE — 99283 EMERGENCY DEPT VISIT LOW MDM: CPT
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
## 2018-07-07 ENCOUNTER — HOSPITAL ENCOUNTER (EMERGENCY)
Facility: HOSPITAL | Age: 38
Discharge: HOME | End: 2018-07-07
Attending: EMERGENCY MEDICINE | Admitting: EMERGENCY MEDICINE

## 2018-07-07 ENCOUNTER — APPOINTMENT (OUTPATIENT)
Dept: CT IMAGING | Facility: HOSPITAL | Age: 38
End: 2018-07-07

## 2018-07-07 VITALS
TEMPERATURE: 97.9 F | OXYGEN SATURATION: 99 % | SYSTOLIC BLOOD PRESSURE: 124 MMHG | DIASTOLIC BLOOD PRESSURE: 84 MMHG | HEART RATE: 82 BPM | BODY MASS INDEX: 39.49 KG/M2 | RESPIRATION RATE: 16 BRPM | WEIGHT: 251.6 LBS | HEIGHT: 67 IN

## 2018-07-07 DIAGNOSIS — K60.2 ANAL FISSURE: ICD-10-CM

## 2018-07-07 DIAGNOSIS — K52.9 COLITIS: Primary | ICD-10-CM

## 2018-07-07 LAB
ALBUMIN SERPL-MCNC: 4.2 G/DL (ref 3.5–5)
ALBUMIN/GLOB SERPL: 1.3 G/DL (ref 1–2)
ALP SERPL-CCNC: 106 U/L (ref 38–126)
ALT SERPL W P-5'-P-CCNC: 19 U/L (ref 13–69)
AMPHET+METHAMPHET UR QL: NEGATIVE
AMPHETAMINES UR QL: NEGATIVE
ANION GAP SERPL CALCULATED.3IONS-SCNC: 14.2 MMOL/L (ref 10–20)
AST SERPL-CCNC: 22 U/L (ref 15–46)
B-HCG UR QL: NEGATIVE
BACTERIA UR QL AUTO: ABNORMAL /HPF
BARBITURATES UR QL SCN: NEGATIVE
BASOPHILS # BLD AUTO: 0.07 10*3/MM3 (ref 0–0.2)
BASOPHILS NFR BLD AUTO: 0.4 % (ref 0–2.5)
BENZODIAZ UR QL SCN: NEGATIVE
BILIRUB SERPL-MCNC: 0.6 MG/DL (ref 0.2–1.3)
BILIRUB UR QL STRIP: NEGATIVE
BUN BLD-MCNC: 14 MG/DL (ref 7–20)
BUN/CREAT SERPL: 17.5 (ref 7.1–23.5)
BUPRENORPHINE SERPL-MCNC: NEGATIVE NG/ML
CALCIUM SPEC-SCNC: 10.1 MG/DL (ref 8.4–10.2)
CANNABINOIDS SERPL QL: NEGATIVE
CHLORIDE SERPL-SCNC: 105 MMOL/L (ref 98–107)
CLARITY UR: CLEAR
CO2 SERPL-SCNC: 23 MMOL/L (ref 26–30)
COCAINE UR QL: NEGATIVE
COLOR UR: YELLOW
CREAT BLD-MCNC: 0.8 MG/DL (ref 0.6–1.3)
DEPRECATED RDW RBC AUTO: 42.1 FL (ref 37–54)
EOSINOPHIL # BLD AUTO: 0.28 10*3/MM3 (ref 0–0.7)
EOSINOPHIL NFR BLD AUTO: 1.5 % (ref 0–7)
ERYTHROCYTE [DISTWIDTH] IN BLOOD BY AUTOMATED COUNT: 13.3 % (ref 11.5–14.5)
GFR SERPL CREATININE-BSD FRML MDRD: 80 ML/MIN/1.73
GLOBULIN UR ELPH-MCNC: 3.2 GM/DL
GLUCOSE BLD-MCNC: 139 MG/DL (ref 74–98)
GLUCOSE UR STRIP-MCNC: NEGATIVE MG/DL
HCT VFR BLD AUTO: 42.6 % (ref 37–47)
HGB BLD-MCNC: 14.3 G/DL (ref 12–16)
HGB UR QL STRIP.AUTO: ABNORMAL
HOLD SPECIMEN: NORMAL
HOLD SPECIMEN: NORMAL
HYALINE CASTS UR QL AUTO: ABNORMAL /LPF
IMM GRANULOCYTES # BLD: 0.07 10*3/MM3 (ref 0–0.06)
IMM GRANULOCYTES NFR BLD: 0.4 % (ref 0–0.6)
KETONES UR QL STRIP: ABNORMAL
LEUKOCYTE ESTERASE UR QL STRIP.AUTO: NEGATIVE
LIPASE SERPL-CCNC: 29 U/L (ref 23–300)
LYMPHOCYTES # BLD AUTO: 1.41 10*3/MM3 (ref 0.6–3.4)
LYMPHOCYTES NFR BLD AUTO: 7.7 % (ref 10–50)
MCH RBC QN AUTO: 29.1 PG (ref 27–31)
MCHC RBC AUTO-ENTMCNC: 33.6 G/DL (ref 30–37)
MCV RBC AUTO: 86.8 FL (ref 81–99)
METHADONE UR QL SCN: NEGATIVE
MONOCYTES # BLD AUTO: 1.05 10*3/MM3 (ref 0–0.9)
MONOCYTES NFR BLD AUTO: 5.7 % (ref 0–12)
MUCOUS THREADS URNS QL MICRO: ABNORMAL /HPF
NEUTROPHILS # BLD AUTO: 15.54 10*3/MM3 (ref 2–6.9)
NEUTROPHILS NFR BLD AUTO: 84.3 % (ref 37–80)
NITRITE UR QL STRIP: NEGATIVE
NRBC BLD MANUAL-RTO: 0 /100 WBC (ref 0–0)
OPIATES UR QL: NEGATIVE
OXYCODONE UR QL SCN: NEGATIVE
PCP UR QL SCN: NEGATIVE
PH UR STRIP.AUTO: 6 [PH] (ref 5–8)
PLATELET # BLD AUTO: 288 10*3/MM3 (ref 130–400)
PMV BLD AUTO: 10.4 FL (ref 6–12)
POTASSIUM BLD-SCNC: 4.2 MMOL/L (ref 3.5–5.1)
PROPOXYPH UR QL: NEGATIVE
PROT SERPL-MCNC: 7.4 G/DL (ref 6.3–8.2)
PROT UR QL STRIP: NEGATIVE
RBC # BLD AUTO: 4.91 10*6/MM3 (ref 4.2–5.4)
RBC # UR: ABNORMAL /HPF
REF LAB TEST METHOD: ABNORMAL
SODIUM BLD-SCNC: 138 MMOL/L (ref 137–145)
SP GR UR STRIP: >=1.03 (ref 1–1.03)
SQUAMOUS #/AREA URNS HPF: ABNORMAL /HPF
TRICYCLICS UR QL SCN: NEGATIVE
UROBILINOGEN UR QL STRIP: ABNORMAL
WBC NRBC COR # BLD: 18.42 10*3/MM3 (ref 4.8–10.8)
WBC UR QL AUTO: ABNORMAL /HPF
WHOLE BLOOD HOLD SPECIMEN: NORMAL
WHOLE BLOOD HOLD SPECIMEN: NORMAL

## 2018-07-07 PROCEDURE — 25010000002 IOPAMIDOL 61 % SOLUTION: Performed by: EMERGENCY MEDICINE

## 2018-07-07 PROCEDURE — 80306 DRUG TEST PRSMV INSTRMNT: CPT | Performed by: PHYSICIAN ASSISTANT

## 2018-07-07 PROCEDURE — 96361 HYDRATE IV INFUSION ADD-ON: CPT

## 2018-07-07 PROCEDURE — 96366 THER/PROPH/DIAG IV INF ADDON: CPT

## 2018-07-07 PROCEDURE — 96365 THER/PROPH/DIAG IV INF INIT: CPT

## 2018-07-07 PROCEDURE — 81025 URINE PREGNANCY TEST: CPT | Performed by: PHYSICIAN ASSISTANT

## 2018-07-07 PROCEDURE — 83690 ASSAY OF LIPASE: CPT | Performed by: EMERGENCY MEDICINE

## 2018-07-07 PROCEDURE — 81001 URINALYSIS AUTO W/SCOPE: CPT | Performed by: PHYSICIAN ASSISTANT

## 2018-07-07 PROCEDURE — 74177 CT ABD & PELVIS W/CONTRAST: CPT

## 2018-07-07 PROCEDURE — 96375 TX/PRO/DX INJ NEW DRUG ADDON: CPT

## 2018-07-07 PROCEDURE — 25010000002 LEVOFLOXACIN PER 250 MG: Performed by: PHYSICIAN ASSISTANT

## 2018-07-07 PROCEDURE — 25010000002 ONDANSETRON PER 1 MG: Performed by: PHYSICIAN ASSISTANT

## 2018-07-07 PROCEDURE — 99284 EMERGENCY DEPT VISIT MOD MDM: CPT

## 2018-07-07 PROCEDURE — 80053 COMPREHEN METABOLIC PANEL: CPT | Performed by: EMERGENCY MEDICINE

## 2018-07-07 PROCEDURE — 25010000002 KETOROLAC TROMETHAMINE PER 15 MG: Performed by: PHYSICIAN ASSISTANT

## 2018-07-07 PROCEDURE — 85025 COMPLETE CBC W/AUTO DIFF WBC: CPT | Performed by: EMERGENCY MEDICINE

## 2018-07-07 RX ORDER — ONDANSETRON 4 MG/1
4 TABLET, ORALLY DISINTEGRATING ORAL EVERY 8 HOURS PRN
Qty: 8 TABLET | Refills: 0 | Status: SHIPPED | OUTPATIENT
Start: 2018-07-07 | End: 2018-07-23

## 2018-07-07 RX ORDER — LEVOFLOXACIN 5 MG/ML
750 INJECTION, SOLUTION INTRAVENOUS ONCE
Status: COMPLETED | OUTPATIENT
Start: 2018-07-07 | End: 2018-07-07

## 2018-07-07 RX ORDER — KETOROLAC TROMETHAMINE 30 MG/ML
15 INJECTION, SOLUTION INTRAMUSCULAR; INTRAVENOUS ONCE
Status: COMPLETED | OUTPATIENT
Start: 2018-07-07 | End: 2018-07-07

## 2018-07-07 RX ORDER — SODIUM CHLORIDE 0.9 % (FLUSH) 0.9 %
10 SYRINGE (ML) INJECTION AS NEEDED
Status: DISCONTINUED | OUTPATIENT
Start: 2018-07-07 | End: 2018-07-07 | Stop reason: HOSPADM

## 2018-07-07 RX ORDER — METRONIDAZOLE 500 MG/1
500 TABLET ORAL 2 TIMES DAILY
Qty: 14 TABLET | Refills: 0 | Status: SHIPPED | OUTPATIENT
Start: 2018-07-07 | End: 2018-07-23

## 2018-07-07 RX ORDER — ACETAMINOPHEN 325 MG/1
650 TABLET ORAL ONCE
Status: COMPLETED | OUTPATIENT
Start: 2018-07-07 | End: 2018-07-07

## 2018-07-07 RX ORDER — ONDANSETRON 2 MG/ML
4 INJECTION INTRAMUSCULAR; INTRAVENOUS ONCE
Status: COMPLETED | OUTPATIENT
Start: 2018-07-07 | End: 2018-07-07

## 2018-07-07 RX ORDER — METOPROLOL TARTRATE 100 MG/1
100 TABLET ORAL DAILY
COMMUNITY
End: 2019-03-22

## 2018-07-07 RX ORDER — METRONIDAZOLE 500 MG/1
500 TABLET ORAL ONCE
Status: COMPLETED | OUTPATIENT
Start: 2018-07-07 | End: 2018-07-07

## 2018-07-07 RX ORDER — SENNOSIDES 8.6 MG
650 CAPSULE ORAL EVERY 8 HOURS PRN
Qty: 12 TABLET | Refills: 0 | Status: SHIPPED | OUTPATIENT
Start: 2018-07-07 | End: 2018-07-23

## 2018-07-07 RX ORDER — CIPROFLOXACIN 500 MG/1
500 TABLET, FILM COATED ORAL 2 TIMES DAILY
Qty: 14 TABLET | Refills: 0 | Status: SHIPPED | OUTPATIENT
Start: 2018-07-07 | End: 2018-07-23

## 2018-07-07 RX ADMIN — METRONIDAZOLE 500 MG: 500 TABLET ORAL at 11:55

## 2018-07-07 RX ADMIN — SODIUM CHLORIDE 1000 ML: 9 INJECTION, SOLUTION INTRAVENOUS at 10:28

## 2018-07-07 RX ADMIN — ONDANSETRON 4 MG: 2 INJECTION, SOLUTION INTRAMUSCULAR; INTRAVENOUS at 10:29

## 2018-07-07 RX ADMIN — SODIUM CHLORIDE 1000 ML: 9 INJECTION, SOLUTION INTRAVENOUS at 11:59

## 2018-07-07 RX ADMIN — ACETAMINOPHEN 650 MG: 325 TABLET, FILM COATED ORAL at 11:55

## 2018-07-07 RX ADMIN — KETOROLAC TROMETHAMINE 15 MG: 30 INJECTION, SOLUTION INTRAMUSCULAR at 12:29

## 2018-07-07 RX ADMIN — IOPAMIDOL 75 ML: 612 INJECTION, SOLUTION INTRAVENOUS at 11:14

## 2018-07-07 RX ADMIN — LEVOFLOXACIN 750 MG: 5 INJECTION, SOLUTION INTRAVENOUS at 11:56

## 2018-07-07 RX ADMIN — IOPAMIDOL 100 ML: 612 INJECTION, SOLUTION INTRAVENOUS at 10:58
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
## 2018-07-07 NOTE — ED NOTES
At bedside with PATRICIA Ayoub for rectal exam.      Shelia Zhang RN  07/07/18 1134       Shelia Zhang RN  07/07/18 1144

## 2018-07-09 ENCOUNTER — OFFICE VISIT (OUTPATIENT)
Dept: GASTROENTEROLOGY | Facility: CLINIC | Age: 38
End: 2018-07-09

## 2018-07-09 ENCOUNTER — PREP FOR SURGERY (OUTPATIENT)
Dept: OTHER | Facility: HOSPITAL | Age: 38
End: 2018-07-09

## 2018-07-09 VITALS
DIASTOLIC BLOOD PRESSURE: 87 MMHG | BODY MASS INDEX: 40.34 KG/M2 | WEIGHT: 257 LBS | TEMPERATURE: 98.2 F | SYSTOLIC BLOOD PRESSURE: 138 MMHG | RESPIRATION RATE: 16 BRPM | HEIGHT: 67 IN | HEART RATE: 83 BPM

## 2018-07-09 DIAGNOSIS — R93.3 ABNORMAL CT SCAN, COLON: ICD-10-CM

## 2018-07-09 DIAGNOSIS — R10.32 LEFT LOWER QUADRANT PAIN: Primary | ICD-10-CM

## 2018-07-09 DIAGNOSIS — K60.2 ANAL FISSURE: ICD-10-CM

## 2018-07-09 DIAGNOSIS — K62.5 BRIGHT RED BLOOD PER RECTUM: ICD-10-CM

## 2018-07-09 DIAGNOSIS — R19.7 DIARRHEA, UNSPECIFIED TYPE: ICD-10-CM

## 2018-07-09 DIAGNOSIS — K52.9 COLITIS: Primary | ICD-10-CM

## 2018-07-09 DIAGNOSIS — Z12.11 COLON CANCER SCREENING: ICD-10-CM

## 2018-07-09 DIAGNOSIS — R12 HEARTBURN: Chronic | ICD-10-CM

## 2018-07-09 PROCEDURE — 99244 OFF/OP CNSLTJ NEW/EST MOD 40: CPT | Performed by: NURSE PRACTITIONER

## 2018-07-09 RX ORDER — SODIUM CHLORIDE 9 MG/ML
70 INJECTION, SOLUTION INTRAVENOUS CONTINUOUS PRN
Status: CANCELLED | OUTPATIENT
Start: 2018-07-09
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
## 2018-07-10 PROBLEM — Z12.11 COLON CANCER SCREENING: Status: ACTIVE | Noted: 2018-07-10

## 2018-07-12 ENCOUNTER — TELEPHONE (OUTPATIENT)
Dept: GASTROENTEROLOGY | Facility: CLINIC | Age: 38
End: 2018-07-12

## 2018-07-12 DIAGNOSIS — R10.32 LEFT LOWER QUADRANT PAIN: Primary | ICD-10-CM

## 2018-07-12 RX ORDER — AMOXICILLIN AND CLAVULANATE POTASSIUM 875; 125 MG/1; MG/1
1 TABLET, FILM COATED ORAL EVERY 12 HOURS SCHEDULED
Qty: 14 TABLET | Refills: 0 | Status: SHIPPED | OUTPATIENT
Start: 2018-07-12 | End: 2018-07-23

## 2018-07-12 NOTE — TELEPHONE ENCOUNTER
"Called and spoke with patient. She had numbness and tingling with the Flagyl and she does not want to take the Cipro as it has a \"black box warning\". She wants to try something else. She is feeling better today, her pain is no longer constant and will come and go. Patient is able to take Augmentin without any problems. Will send in Augmentin po bid x 14 days. Patient will continue to take probiotics. She will call back if symptoms do not continue to improve or if they worsen.   "

## 2018-07-12 NOTE — TELEPHONE ENCOUNTER
----- Message from Agustina Her MA sent at 7/12/2018  1:36 PM EDT -----  FLAGYL MADE HER JAW LACK AND FACE TWITCHING AND SHE IS NOT ALLOWED TO HAVE CIPRO. SEE BELOW FOR CALL BACK NUMBER. THANKS!  ----- Message -----  From: Agustina Elias  Sent: 7/12/2018   1:13 PM  To: [facility]    PT LEFT VMAIL STATING SHE STOPPED TAKING HER ANTIBIOTIC DUE TO HAVING ALLERGIC REACTION. WANTS A CALL BACK TO KNOW WHAT TO DO NOW. [phone]

## 2018-07-17 RX ORDER — IRBESARTAN 300 MG/1
300 TABLET ORAL NIGHTLY
Qty: 30 TABLET | Refills: 2 | Status: SHIPPED | OUTPATIENT
Start: 2018-07-17 | End: 2018-09-24 | Stop reason: SDUPTHER

## 2018-07-23 ENCOUNTER — OFFICE VISIT (OUTPATIENT)
Dept: INTERNAL MEDICINE | Facility: CLINIC | Age: 38
End: 2018-07-23

## 2018-07-23 ENCOUNTER — TELEPHONE (OUTPATIENT)
Dept: NEUROLOGY | Facility: CLINIC | Age: 38
End: 2018-07-23

## 2018-07-23 ENCOUNTER — HOSPITAL ENCOUNTER (EMERGENCY)
Facility: HOSPITAL | Age: 38
Discharge: LEFT BEFORE DISCHARGE | End: 2018-07-23
Attending: EMERGENCY MEDICINE

## 2018-07-23 VITALS
HEART RATE: 77 BPM | SYSTOLIC BLOOD PRESSURE: 146 MMHG | WEIGHT: 250.4 LBS | BODY MASS INDEX: 39.3 KG/M2 | OXYGEN SATURATION: 100 % | TEMPERATURE: 98.8 F | DIASTOLIC BLOOD PRESSURE: 78 MMHG | HEIGHT: 67 IN | RESPIRATION RATE: 20 BRPM

## 2018-07-23 VITALS
HEART RATE: 82 BPM | DIASTOLIC BLOOD PRESSURE: 96 MMHG | WEIGHT: 257 LBS | HEIGHT: 67 IN | SYSTOLIC BLOOD PRESSURE: 130 MMHG | TEMPERATURE: 98.1 F | BODY MASS INDEX: 40.34 KG/M2 | OXYGEN SATURATION: 99 %

## 2018-07-23 DIAGNOSIS — F31.10 BIPOLAR I DISORDER WITH MANIA (HCC): Primary | ICD-10-CM

## 2018-07-23 DIAGNOSIS — F32.A DEPRESSION WITH SUICIDAL IDEATION: ICD-10-CM

## 2018-07-23 DIAGNOSIS — R45.851 DEPRESSION WITH SUICIDAL IDEATION: ICD-10-CM

## 2018-07-23 PROCEDURE — 99213 OFFICE O/P EST LOW 20 MIN: CPT | Performed by: PHYSICIAN ASSISTANT

## 2018-07-23 RX ORDER — LITHIUM CARBONATE 300 MG/1
CAPSULE ORAL
Qty: 90 CAPSULE | Refills: 5 | Status: SHIPPED | OUTPATIENT
Start: 2018-07-23 | End: 2019-06-27 | Stop reason: SDUPTHER

## 2018-07-23 RX ORDER — LITHIUM CARBONATE 300 MG/1
CAPSULE ORAL
COMMUNITY
Start: 2018-07-01 | End: 2018-07-23 | Stop reason: SDUPTHER

## 2018-07-23 NOTE — PROGRESS NOTES
Nicole Altman is a 37 y.o. female.     Subjective   History of Present Illness   Here today for follow up of bipolar disorder. She reports that she is currently feeling the worst she has in decades. She is having persistent suicidal ideation except for around 24 hours following ECT which she has 1-3 times per week. She resumed [place] last night at my direction and feels there has already been improvement in suicidal ideation today.  She continues to use the estrogen patch as prescribed from Gyn which previously eliminated suicidal ideation.   She is unwilling to see a psychiatrist if it means she will have to see their  since she has been with the same  for many years and feels she is a vital part of her treatment plan.       The following portions of the patient's history were reviewed and updated as appropriate: allergies, current medications, past family history, past medical history, past social history, past surgical history and problem list.    Review of Systems    Constitutional: Negative for appetite change, chills, fatigue, fever and unexpected weight change.   Respiratory: Negative for cough, chest tightness, shortness of breath and wheezing.    Cardiovascular: Negative for chest pain, palpitations and leg swelling.   Gastrointestinal: Negative for abdominal distention, abdominal pain, blood in stool, constipation, diarrhea, nausea and vomiting.   Endocrine: Negative for cold intolerance, heat intolerance, polydipsia, polyphagia and polyuria.   Musculoskeletal: Negative for arthralgias, back pain, joint swelling, myalgias, neck pain and neck stiffness.   Skin: Negative for color change, pallor, rash and wound.   Allergic/Immunologic: Negative for environmental allergies, food allergies and immunocompromised state.   Neurological: Negative for dizziness, tremors, seizures, weakness, numbness and headaches.   Hematological: Negative for adenopathy. Does not bruise/bleed easily.  "  Psychiatric/Behavioral: dysphoric mood, suicidal ideation, agitation.  Negative for sleep disturbances, behavioral problems, confusion, hallucinations.  The patient is not nervous/anxious.      Objective    Physical Exam  Constitutional:  Appears well-developed and well-nourished.   Cardiovascular: Normal rate, regular rhythm and normal heart sounds.    Pulmonary/Chest: Effort normal and breath sounds normal. No respiratory distress.  Has no wheezes or rales. Exhibits no chest wall tenderness.   Abdominal: Soft. Bowel sounds are normal. Exhibits no distension and no mass. There is no tenderness.   Musculoskeletal: Normal range of motion. Exhibits no tenderness.   Neurological: Alert and oriented to person, place, and time.   Skin: Skin is warm and dry.   Psychiatric: Has a dysphoric mood and affect. Behavior is normal. Judgment and thought content normal.       /96   Pulse 82   Temp 98.1 °F (36.7 °C)   Ht 170.2 cm (67.01\")   Wt 117 kg (257 lb)   SpO2 99%   BMI 40.24 kg/m²     Nursing note and vitals reviewed.        Assessment/Plan   Nicole was seen today for follow-up.    Diagnoses and all orders for this visit:    Bipolar I disorder with carlos manuel (CMS/[facility])  -     lithium carbonate 300 MG capsule; Take 1 capsule each morning and 2 capsules every evening.  -     Ambulatory Referral to Psychiatry    Depression with suicidal ideation  -     Ambulatory Referral to Psychiatry    Continue Lamictal as previously prescribed.     Begin by taking 300 mg bid for few days then may increase back to previously tolerated dose which managed suicidal ideation well.            "

## 2018-07-24 DIAGNOSIS — F41.9 ANXIETY: Primary | ICD-10-CM

## 2018-07-24 DIAGNOSIS — F32.9 REACTIVE DEPRESSION: ICD-10-CM

## 2018-07-27 RX ORDER — LAMOTRIGINE 200 MG/1
200 TABLET ORAL 2 TIMES DAILY
Qty: 60 TABLET | Refills: 5 | Status: SHIPPED | OUTPATIENT
Start: 2018-07-27 | End: 2018-09-24 | Stop reason: SDUPTHER

## 2018-07-31 ENCOUNTER — OFFICE VISIT (OUTPATIENT)
Dept: GASTROENTEROLOGY | Facility: CLINIC | Age: 38
End: 2018-07-31

## 2018-07-31 VITALS
BODY MASS INDEX: 38.92 KG/M2 | RESPIRATION RATE: 16 BRPM | SYSTOLIC BLOOD PRESSURE: 129 MMHG | HEART RATE: 64 BPM | TEMPERATURE: 97.6 F | DIASTOLIC BLOOD PRESSURE: 82 MMHG | HEIGHT: 67 IN | WEIGHT: 248 LBS

## 2018-07-31 DIAGNOSIS — K62.5 BRIGHT RED BLOOD PER RECTUM: ICD-10-CM

## 2018-07-31 DIAGNOSIS — Z12.11 COLON CANCER SCREENING: ICD-10-CM

## 2018-07-31 DIAGNOSIS — R12 HEARTBURN: Chronic | ICD-10-CM

## 2018-07-31 DIAGNOSIS — R19.7 DIARRHEA, UNSPECIFIED TYPE: ICD-10-CM

## 2018-07-31 DIAGNOSIS — R11.11 VOMITING WITHOUT NAUSEA, INTRACTABILITY OF VOMITING NOT SPECIFIED, UNSPECIFIED VOMITING TYPE: ICD-10-CM

## 2018-07-31 DIAGNOSIS — R63.0 DECREASED APPETITE: Primary | ICD-10-CM

## 2018-07-31 DIAGNOSIS — R10.32 LEFT LOWER QUADRANT PAIN: ICD-10-CM

## 2018-07-31 DIAGNOSIS — R93.3 ABNORMAL CT SCAN, COLON: ICD-10-CM

## 2018-07-31 PROCEDURE — 99214 OFFICE O/P EST MOD 30 MIN: CPT | Performed by: NURSE PRACTITIONER

## 2018-07-31 NOTE — PATIENT INSTRUCTIONS
1. Antireflux measures: Avoid fried, fatty foods, alcohol, chocolate, coffee, tea,  soft drinks, peppermint and spearmint, spicy foods, tomatoes and tomato based foods, onion based foods, and smoking. Other antireflux measures include weight reduction if overweight, avoiding tight clothing around the abdomen, elevating the head of the bed 6 inches with blocks under the head board, and don't drink or eat before going to bed and avoid lying down immediately after meals.  2. Ranitidine 150 mg 1 po twice a day.  3. Zofran 4 mg 1 po every 8 hours as needed for nausea.  4. High fiber diet with liberal water intake. Discussed in detail. Patient does not have dietary restrictions.  5. Colonoscopy: Description of the procedure, risks, benefits, alternatives and options, including nonoperative options, were discussed with the patient in detail. The patient understands and wishes to wait at this time. She will consider and call back.  6. Upper endoscopy-EGD: Description of the procedure, risks, benefits, alternatives and options, including nonoperative options, were discussed with the patient in detail. The patient understands and wishes to wait at this time. She will consider and call back.  7. Follow up: 4-6 weeks or sooner if needed    The patient was seen along with her friend, Marija Clifford. All questions answered.
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
## 2018-08-03 ENCOUNTER — TELEPHONE (OUTPATIENT)
Dept: INTERNAL MEDICINE | Facility: CLINIC | Age: 38
End: 2018-08-03

## 2018-08-03 DIAGNOSIS — Z79.899 LITHIUM USE: Primary | ICD-10-CM

## 2018-08-17 DIAGNOSIS — M77.50 BONE SPUR OF FOOT: Primary | ICD-10-CM

## 2018-09-24 RX ORDER — IRBESARTAN 300 MG/1
TABLET ORAL
Qty: 30 TABLET | Refills: 3 | Status: SHIPPED | OUTPATIENT
Start: 2018-09-24 | End: 2019-02-11 | Stop reason: SDUPTHER

## 2018-09-24 RX ORDER — LAMOTRIGINE 150 MG/1
150 TABLET ORAL 2 TIMES DAILY
Qty: 60 TABLET | Refills: 5 | Status: SHIPPED | OUTPATIENT
Start: 2018-09-24 | End: 2019-04-09 | Stop reason: SDUPTHER

## 2018-11-27 RX ORDER — POLYETHYLENE GLYCOL 3350 17 G/17G
17 POWDER, FOR SOLUTION ORAL DAILY PRN
Qty: 30 PACKET | Refills: 5 | Status: SHIPPED | OUTPATIENT
Start: 2018-11-27 | End: 2019-03-22

## 2018-11-27 RX ORDER — SENNA AND DOCUSATE SODIUM 50; 8.6 MG/1; MG/1
1 TABLET, FILM COATED ORAL DAILY
Qty: 30 TABLET | Refills: 5 | Status: SHIPPED | OUTPATIENT
Start: 2018-11-27 | End: 2019-03-22

## 2019-01-10 RX ORDER — SUMATRIPTAN 50 MG/1
TABLET, FILM COATED ORAL
Refills: 4 | COMMUNITY
Start: 2018-11-22 | End: 2019-01-10 | Stop reason: SDUPTHER

## 2019-01-10 RX ORDER — METOPROLOL SUCCINATE 100 MG/1
TABLET, EXTENDED RELEASE ORAL
Refills: 5 | COMMUNITY
Start: 2018-11-23 | End: 2019-03-08 | Stop reason: SDUPTHER

## 2019-01-10 RX ORDER — SUMATRIPTAN 50 MG/1
TABLET, FILM COATED ORAL
Qty: 9 TABLET | Refills: 4 | Status: SHIPPED | OUTPATIENT
Start: 2019-01-10 | End: 2020-03-05 | Stop reason: SDUPTHER

## 2019-02-11 RX ORDER — IRBESARTAN 300 MG/1
TABLET ORAL
Qty: 30 TABLET | Refills: 3 | Status: SHIPPED | OUTPATIENT
Start: 2019-02-11 | End: 2019-06-03 | Stop reason: SDUPTHER

## 2019-03-08 RX ORDER — METOPROLOL SUCCINATE 100 MG/1
TABLET, EXTENDED RELEASE ORAL
Qty: 45 TABLET | Refills: 5 | Status: SHIPPED | OUTPATIENT
Start: 2019-03-08 | End: 2019-09-20 | Stop reason: SDUPTHER

## 2019-03-22 ENCOUNTER — OFFICE VISIT (OUTPATIENT)
Dept: INTERNAL MEDICINE | Facility: CLINIC | Age: 39
End: 2019-03-22

## 2019-03-22 VITALS
HEART RATE: 72 BPM | DIASTOLIC BLOOD PRESSURE: 86 MMHG | BODY MASS INDEX: 38.45 KG/M2 | HEIGHT: 67 IN | WEIGHT: 245 LBS | OXYGEN SATURATION: 99 % | SYSTOLIC BLOOD PRESSURE: 130 MMHG | TEMPERATURE: 98.4 F

## 2019-03-22 DIAGNOSIS — F32.81 PREMENSTRUAL DYSPHORIC DISORDER: ICD-10-CM

## 2019-03-22 DIAGNOSIS — Z72.0 TOBACCO USE: ICD-10-CM

## 2019-03-22 DIAGNOSIS — F31.60 BIPOLAR DISORDER, MIXED (HCC): ICD-10-CM

## 2019-03-22 DIAGNOSIS — G43.009 MIGRAINE WITHOUT AURA AND WITHOUT STATUS MIGRAINOSUS, NOT INTRACTABLE: Primary | ICD-10-CM

## 2019-03-22 PROCEDURE — 99213 OFFICE O/P EST LOW 20 MIN: CPT | Performed by: PHYSICIAN ASSISTANT

## 2019-03-22 RX ORDER — ESTRADIOL 0.07 MG/D
1 FILM, EXTENDED RELEASE TRANSDERMAL 2 TIMES WEEKLY
Qty: 8 PATCH | Refills: 5 | Status: SHIPPED | OUTPATIENT
Start: 2019-03-25 | End: 2019-10-25 | Stop reason: ALTCHOICE

## 2019-03-22 NOTE — PROGRESS NOTES
Nicole Altman is a 38 y.o. female.     Subjective   History of Present Illness   Here today with concern of depression. She is known to have bipolar disorder which is currently stable with Lamictal and Lithium.  Her gynecologist has prescribed her Estradiol transdermal patches which were initially for treatment of menstrual migraines but had secondary benefit of improving her depression, however, the patch is supposed to be changed twice per week but by the 3rd day she feels very anxious and depressed again.  She reports that as soon as 20 minutes after applying a new patch she will feel much better.  Prior to Estradiol patches she tried oral estradiol which did not have a significant impact like the patches have had. She has also tried oral contraceptives in the past which did not help her headaches and had a negative effect on her bipolar disorder. She currently denies SI or HI.        The following portions of the patient's history were reviewed and updated as appropriate: allergies, current medications, past family history, past medical history, past social history, past surgical history and problem list.    Review of Systems   Constitutional: Negative for appetite change, chills, fatigue, fever and unexpected weight change.   Respiratory: Negative for cough, chest tightness, shortness of breath and wheezing.    Cardiovascular: Negative for chest pain, palpitations and leg swelling.   Genitourinary: Positive for menstrual problem (stable). Negative for decreased urine volume, difficulty urinating, enuresis, genital sores, vaginal discharge and vaginal pain.   Neurological: Positive for headaches (stable).   Psychiatric/Behavioral: Positive for agitation and dysphoric mood. Negative for behavioral problems, confusion, decreased concentration, hallucinations, self-injury, sleep disturbance and suicidal ideas. The patient is nervous/anxious. The patient is not hyperactive.          Objective    Physical Exam  "  Constitutional: She is oriented to person, place, and time. She appears well-developed and well-nourished. No distress.   Eyes: Conjunctivae and EOM are normal. Pupils are equal, round, and reactive to light.   Cardiovascular: Normal rate, regular rhythm and normal heart sounds. Exam reveals no gallop and no friction rub.   No murmur heard.  Pulmonary/Chest: Effort normal and breath sounds normal. No respiratory distress. She has no wheezes. She has no rales.   Neurological: She is alert and oriented to person, place, and time. She displays normal reflexes. No cranial nerve deficit or sensory deficit. She exhibits normal muscle tone. Coordination normal.   Skin: Skin is warm and dry. Capillary refill takes less than 2 seconds. No rash noted. She is not diaphoretic.   Psychiatric: Her behavior is normal. Judgment and thought content normal.   Dysphoric and anxious mood with congruent affect.    Nursing note and vitals reviewed.        /86   Pulse 72   Temp 98.4 °F (36.9 °C)   Ht 170.2 cm (67.01\")   Wt 111 kg (245 lb)   SpO2 99%   BMI 38.36 kg/m²     Nursing note and vitals reviewed.          Assessment/Plan   Nicole was seen today for depression.    Diagnoses and all orders for this visit:    Migraine without aura and without status migrainosus, not intractable  Premenstrual dysphoric disorder  Bipolar disorder, mixed (CMS/HCC)  -     Dose increase: estradiol (MINIVELLE, VIVELLE-DOT) 0.075 MG/24HR patch; Place 1 patch on the skin as directed by provider 2 (Two) Times a Week.    Tobacco use  She is a current smoker and voices understanding of the increased risk of cardiovascular related events including stroke with smoking in combination with hormone replacement therapy. She stated she believes the benefit of treatment outweighs the risks.  She was offered smoking cessation assistance which she declined today.       F/u 1 month or sooner if needed.        "

## 2019-03-24 PROBLEM — F31.10 BIPOLAR I DISORDER WITH MANIA: Status: RESOLVED | Noted: 2018-06-02 | Resolved: 2019-03-24

## 2019-03-25 ENCOUNTER — PRIOR AUTHORIZATION (OUTPATIENT)
Dept: INTERNAL MEDICINE | Facility: CLINIC | Age: 39
End: 2019-03-25

## 2019-03-28 ENCOUNTER — PRIOR AUTHORIZATION (OUTPATIENT)
Dept: INTERNAL MEDICINE | Facility: CLINIC | Age: 39
End: 2019-03-28

## 2019-03-29 NOTE — TELEPHONE ENCOUNTER
Pharmacy said cannot fill med until 5/22/19, said it was rejected because pt had filled it elsewhere and would not be due until then, pt said she has not had it recently, told her she may need to contact insurance co

## 2019-03-29 NOTE — TELEPHONE ENCOUNTER
Approved today  PA Case: [id number], Status: Approved, Coverage Starts on: 3/29/2019 12:00:00 AM, Coverage Ends on: 6/29/2019 12:00:00 AM.   pt and informed

## 2019-04-09 RX ORDER — LAMOTRIGINE 150 MG/1
TABLET ORAL
Qty: 60 TABLET | Refills: 5 | Status: SHIPPED | OUTPATIENT
Start: 2019-04-09 | End: 2019-09-27 | Stop reason: SDUPTHER

## 2019-05-02 RX ORDER — VARENICLINE TARTRATE 1 MG/1
1 TABLET, FILM COATED ORAL 2 TIMES DAILY
Qty: 60 TABLET | Refills: 2 | Status: SHIPPED | OUTPATIENT
Start: 2019-05-02 | End: 2019-06-20

## 2019-06-03 RX ORDER — IRBESARTAN 300 MG/1
TABLET ORAL
Qty: 30 TABLET | Refills: 3 | Status: SHIPPED | OUTPATIENT
Start: 2019-06-03 | End: 2019-11-23 | Stop reason: SDUPTHER

## 2019-06-20 ENCOUNTER — OFFICE VISIT (OUTPATIENT)
Dept: INTERNAL MEDICINE | Facility: CLINIC | Age: 39
End: 2019-06-20

## 2019-06-20 VITALS
TEMPERATURE: 98.5 F | BODY MASS INDEX: 40.49 KG/M2 | DIASTOLIC BLOOD PRESSURE: 74 MMHG | HEART RATE: 72 BPM | WEIGHT: 258 LBS | OXYGEN SATURATION: 99 % | HEIGHT: 67 IN | SYSTOLIC BLOOD PRESSURE: 124 MMHG

## 2019-06-20 DIAGNOSIS — M25.561 ACUTE PAIN OF RIGHT KNEE: Primary | ICD-10-CM

## 2019-06-20 DIAGNOSIS — Z79.890 HORMONE REPLACEMENT THERAPY (HRT): ICD-10-CM

## 2019-06-20 DIAGNOSIS — I83.811 VARICOSE VEINS OF RIGHT LOWER EXTREMITY WITH PAIN: ICD-10-CM

## 2019-06-20 DIAGNOSIS — F17.200 SMOKER: ICD-10-CM

## 2019-06-20 LAB — D DIMER PPP FEU-MCNC: 0.48 MCGFEU/ML (ref 0–0.57)

## 2019-06-20 PROCEDURE — 99213 OFFICE O/P EST LOW 20 MIN: CPT | Performed by: PHYSICIAN ASSISTANT

## 2019-06-20 NOTE — PROGRESS NOTES
Nicole Altman is a 38 y.o. female.     Subjective   History of Present Illness   Woke 2 days ago woke with sudden pain in the right medial knee area. No known injury.  She experiences pain with palpation and ambulation but experiences less pain at rest.  She feels there may have had mild edema and erythema the first day but now does not.  She does have varicose veins of bilateral legs.  No CP or SOA. She quit smoking around 2 months ago. She does use an estrogen hormone replacement patch.  No history of DVT or PE.         The following portions of the patient's history were reviewed and updated as appropriate: allergies, current medications, past family history, past medical history, past social history, past surgical history and problem list.    Review of Systems   Constitutional: Negative for appetite change, chills, fatigue, fever and unexpected weight change.   Respiratory: Negative for cough, chest tightness, shortness of breath and wheezing.    Cardiovascular: Positive for leg swelling. Negative for chest pain and palpitations.        Varicosities.   Musculoskeletal: Positive for arthralgias and joint swelling. Negative for back pain, gait problem, myalgias, neck pain and neck stiffness.   Skin: Positive for color change. Negative for rash and wound.   Neurological: Negative for dizziness, tremors, speech difficulty, weakness, numbness and headaches.   Hematological: Negative for adenopathy. Does not bruise/bleed easily.   Psychiatric/Behavioral: Negative for dysphoric mood, self-injury and suicidal ideas. The patient is not nervous/anxious.          Objective    Physical Exam   Constitutional: She is oriented to person, place, and time. She appears well-developed and well-nourished. No distress.   Eyes: Conjunctivae and EOM are normal. Pupils are equal, round, and reactive to light.   Neck: Normal range of motion. Neck supple.   Cardiovascular: Normal rate, regular rhythm and normal heart sounds. Exam  "reveals no gallop and no friction rub.   No murmur heard.  Pulmonary/Chest: Effort normal and breath sounds normal. No respiratory distress. She has no wheezes. She has no rales.   Abdominal: Soft. Bowel sounds are normal. She exhibits no mass. There is no tenderness. No hernia.   Musculoskeletal: Normal range of motion. She exhibits tenderness. She exhibits no edema or deformity.   Exquisite tenderness of right medial knee and calf in the area of varicosities.  Positive Homans sign on right.   Lymphadenopathy:     She has no cervical adenopathy.   Neurological: She is alert and oriented to person, place, and time. She displays normal reflexes. No cranial nerve deficit or sensory deficit. She exhibits normal muscle tone. Coordination normal.   Skin: Skin is warm and dry. Capillary refill takes less than 2 seconds. No rash noted. She is not diaphoretic.   Psychiatric: She has a normal mood and affect. Her behavior is normal. Judgment and thought content normal.   Nursing note and vitals reviewed.        /74   Pulse 72   Temp 98.5 °F (36.9 °C)   Ht 170.2 cm (67.01\")   Wt 117 kg (258 lb)   SpO2 99%   BMI 40.40 kg/m²     Nursing note and vitals reviewed.          Assessment/Plan   Nicole was seen today for leg pain.    Diagnoses and all orders for this visit:    Acute pain of right knee  -     D-dimer, Quantitative    Varicose veins of right lower extremity with pain  -     D-dimer, Quantitative    It was recommended that she be evaluated with right lower extremity venous duplex ultrasound which she declined today.  She was agreeable to having d-dimer checked and she may reconsider ultrasound after review.      ER with any worsening symptoms, chest pain or SOA.       "

## 2019-06-27 DIAGNOSIS — F31.10 BIPOLAR I DISORDER WITH MANIA (HCC): ICD-10-CM

## 2019-06-27 RX ORDER — LITHIUM CARBONATE 300 MG/1
CAPSULE ORAL
Qty: 90 CAPSULE | Refills: 5 | Status: SHIPPED | OUTPATIENT
Start: 2019-06-27 | End: 2019-12-05 | Stop reason: SDUPTHER

## 2019-07-02 ENCOUNTER — HOSPITAL ENCOUNTER (OUTPATIENT)
Dept: ULTRASOUND IMAGING | Facility: HOSPITAL | Age: 39
Discharge: HOME | End: 2019-07-02
Admitting: PHYSICIAN ASSISTANT

## 2019-07-02 PROCEDURE — 93971 EXTREMITY STUDY: CPT

## 2019-07-05 ENCOUNTER — PRIOR AUTHORIZATION (OUTPATIENT)
Dept: INTERNAL MEDICINE | Facility: CLINIC | Age: 39
End: 2019-07-05

## 2019-07-05 DIAGNOSIS — K21.9 GASTROESOPHAGEAL REFLUX DISEASE, ESOPHAGITIS PRESENCE NOT SPECIFIED: ICD-10-CM

## 2019-07-05 RX ORDER — RANITIDINE 150 MG/1
TABLET ORAL
Qty: 60 TABLET | Refills: 11 | Status: SHIPPED | OUTPATIENT
Start: 2019-07-05 | End: 2019-10-04 | Stop reason: ALTCHOICE

## 2019-07-09 DIAGNOSIS — F17.200 CURRENT SMOKER: Primary | ICD-10-CM

## 2019-07-09 RX ORDER — VARENICLINE TARTRATE 1 MG/1
1 TABLET, FILM COATED ORAL 2 TIMES DAILY
Qty: 60 TABLET | Refills: 5 | Status: SHIPPED | OUTPATIENT
Start: 2019-07-09 | End: 2019-11-04 | Stop reason: SDUPTHER

## 2019-07-09 RX ORDER — VARENICLINE TARTRATE 0.5 MG/1
TABLET, FILM COATED ORAL
Qty: 11 TABLET | Refills: 0 | Status: SHIPPED | OUTPATIENT
Start: 2019-07-09 | End: 2019-09-23

## 2019-09-13 DIAGNOSIS — R11.0 NAUSEA: ICD-10-CM

## 2019-09-13 RX ORDER — ONDANSETRON 8 MG/1
TABLET, ORALLY DISINTEGRATING ORAL
Qty: 12 TABLET | Refills: 5 | Status: SHIPPED | OUTPATIENT
Start: 2019-09-13 | End: 2020-01-30 | Stop reason: SDUPTHER

## 2019-09-19 ENCOUNTER — PRIOR AUTHORIZATION (OUTPATIENT)
Dept: INTERNAL MEDICINE | Facility: CLINIC | Age: 39
End: 2019-09-19

## 2019-09-19 DIAGNOSIS — F31.60 BIPOLAR DISORDER, MIXED (HCC): Primary | ICD-10-CM

## 2019-09-20 RX ORDER — METOPROLOL SUCCINATE 100 MG/1
TABLET, EXTENDED RELEASE ORAL
Qty: 45 TABLET | Refills: 5 | Status: SHIPPED | OUTPATIENT
Start: 2019-09-20 | End: 2020-03-05 | Stop reason: SDUPTHER

## 2019-09-23 ENCOUNTER — OFFICE VISIT (OUTPATIENT)
Dept: INTERNAL MEDICINE | Facility: CLINIC | Age: 39
End: 2019-09-23

## 2019-09-23 VITALS
SYSTOLIC BLOOD PRESSURE: 120 MMHG | OXYGEN SATURATION: 100 % | BODY MASS INDEX: 41.44 KG/M2 | HEIGHT: 67 IN | TEMPERATURE: 98.5 F | WEIGHT: 264 LBS | DIASTOLIC BLOOD PRESSURE: 74 MMHG | HEART RATE: 64 BPM

## 2019-09-23 DIAGNOSIS — F17.200 CURRENT SMOKER: ICD-10-CM

## 2019-09-23 DIAGNOSIS — F31.60 BIPOLAR DISORDER, MIXED (HCC): Primary | ICD-10-CM

## 2019-09-23 PROCEDURE — 99213 OFFICE O/P EST LOW 20 MIN: CPT | Performed by: PHYSICIAN ASSISTANT

## 2019-09-23 NOTE — PROGRESS NOTES
Nicole Altman is a 39 y.o. female.     Subjective   History of Present Illness   Here today for follow-up of bipolar disorder.  Around 10 days ago she contacted the office and requested to begin a mood stabilizer due to increased agitation while awaiting a follow-up appointment at which time an introductory dose of Vraylar 1.5 mg was provided.  After 1 week she contacted the office again requesting dose increase at which time she was instructed to increase to 3 mg daily.  She has felt as though Vraylar has had a positive impact.  People around her have noticed that she seems more relaxed and even happy since starting Vraylar.  She has continued to experience agitation but notes that it is now less frequent and less severe.  She is interested in trying to reduce lithium and Lamictal eventually.  Hypersomnia has significantly improved since starting Chantix.  She denies any recent SI or HI.    She has been using Chantix to aid in smoking cessation and reports that she has quit around 6 weeks ago with the exception of smoking less than 1 cigarette every few days.  She has had significant nausea and some fatigue from Chantix and would like to discontinue it.              The following portions of the patient's history were reviewed and updated as appropriate: allergies, current medications, past family history, past medical history, past social history, past surgical history and problem list.    Review of Systems   Constitutional: Positive for fatigue. Negative for activity change, appetite change, chills, diaphoresis, fever and unexpected weight change.   Eyes: Negative for visual disturbance.   Respiratory: Negative for cough, chest tightness, shortness of breath and wheezing.    Cardiovascular: Negative for chest pain, palpitations and leg swelling.   Gastrointestinal: Positive for nausea. Negative for abdominal distention, abdominal pain, anal bleeding, constipation, diarrhea and vomiting.   Allergic/Immunologic:  "Negative for immunocompromised state.   Neurological: Negative for dizziness, syncope, speech difficulty, light-headedness and numbness.   Hematological: Negative for adenopathy. Does not bruise/bleed easily.   Psychiatric/Behavioral: Positive for agitation, behavioral problems, decreased concentration and dysphoric mood. Negative for confusion, hallucinations, self-injury and suicidal ideas. The patient is nervous/anxious. The patient is not hyperactive.         Hypersomnia-improved.         Objective    Physical Exam   Constitutional: She is oriented to person, place, and time. She appears well-developed and well-nourished. No distress.   HENT:   Head: Normocephalic and atraumatic.   Eyes: Conjunctivae and EOM are normal. Pupils are equal, round, and reactive to light.   Neck: Normal range of motion. Neck supple.   Cardiovascular: Normal rate, regular rhythm and normal heart sounds. Exam reveals no gallop and no friction rub.   No murmur heard.  Pulmonary/Chest: Effort normal and breath sounds normal. No respiratory distress. She has no wheezes. She has no rales.   Abdominal: Soft. Bowel sounds are normal. She exhibits no mass. There is no tenderness. No hernia.   Musculoskeletal: Normal range of motion. She exhibits no edema, tenderness or deformity.   Lymphadenopathy:     She has no cervical adenopathy.   Neurological: She is alert and oriented to person, place, and time. She displays normal reflexes. No cranial nerve deficit or sensory deficit. She exhibits normal muscle tone. Coordination normal.   Skin: Skin is warm and dry. Capillary refill takes less than 2 seconds. No rash noted. She is not diaphoretic.   Psychiatric: She has a normal mood and affect. Her behavior is normal. Judgment and thought content normal.   Nursing note and vitals reviewed.        /74   Pulse 64   Temp 98.5 °F (36.9 °C)   Ht 170.2 cm (67.01\")   Wt 120 kg (264 lb)   SpO2 100%   BMI 41.34 kg/m²     Nursing note and vitals " reviewed.          Assessment/Plan   Nicole was seen today for follow-up.    Diagnoses and all orders for this visit:    Bipolar disorder, mixed (CMS/HCC)  Symptoms of aggression, agitation, dysphoric mood and hypersomnia have all shown to improve with use of Vraylar, she was encouraged to continue at 3 mg daily.  Will continue lithium and Lamictal as well but may consider tapering to discontinue Lamictal in the future.      Current smoker  Quit smoking 6 weeks ago with the exception of one cigarette every couple of days.  She was encouraged to continue Chantix for another 2 weeks and hopes for full smoking cessation.  She is experiencing nausea with Chantix and was advised that if nausea should become severe she should discontinue Chantix use.        She was encouraged to return in approximately 2 months for annual physical with follow-up.  If smoking cessation efforts have failed we may consider a trial without estradiol patch use due to increased risk of blood clots with smoking and hormone replacement therapy.  She is aware of this increased risk and wishes to continue estradiol patch use for the time being.

## 2019-09-27 RX ORDER — LAMOTRIGINE 150 MG/1
TABLET ORAL
Qty: 60 TABLET | Refills: 5 | Status: SHIPPED | OUTPATIENT
Start: 2019-09-27 | End: 2020-01-30 | Stop reason: SDUPTHER

## 2019-10-04 ENCOUNTER — OFFICE VISIT (OUTPATIENT)
Dept: INTERNAL MEDICINE | Facility: CLINIC | Age: 39
End: 2019-10-04

## 2019-10-04 VITALS
HEART RATE: 77 BPM | TEMPERATURE: 97.8 F | BODY MASS INDEX: 43 KG/M2 | DIASTOLIC BLOOD PRESSURE: 74 MMHG | HEIGHT: 67 IN | SYSTOLIC BLOOD PRESSURE: 120 MMHG | OXYGEN SATURATION: 99 % | WEIGHT: 274 LBS

## 2019-10-04 DIAGNOSIS — R10.11 RUQ PAIN: Primary | ICD-10-CM

## 2019-10-04 DIAGNOSIS — K21.9 GASTROESOPHAGEAL REFLUX DISEASE, ESOPHAGITIS PRESENCE NOT SPECIFIED: ICD-10-CM

## 2019-10-04 DIAGNOSIS — M54.41 ACUTE RIGHT-SIDED LOW BACK PAIN WITH RIGHT-SIDED SCIATICA: ICD-10-CM

## 2019-10-04 DIAGNOSIS — F17.210 CIGARETTE SMOKER: ICD-10-CM

## 2019-10-04 DIAGNOSIS — E89.6 HISTORY OF TOTAL ADRENALECTOMY (HCC): ICD-10-CM

## 2019-10-04 PROCEDURE — 99214 OFFICE O/P EST MOD 30 MIN: CPT | Performed by: PHYSICIAN ASSISTANT

## 2019-10-04 RX ORDER — FAMOTIDINE 40 MG/1
40 TABLET, FILM COATED ORAL DAILY
Qty: 30 TABLET | Refills: 5 | Status: SHIPPED | OUTPATIENT
Start: 2019-10-04 | End: 2019-10-21 | Stop reason: ALTCHOICE

## 2019-10-04 NOTE — PATIENT INSTRUCTIONS
Sciatica Rehab  Ask your health care provider which exercises are safe for you. Do exercises exactly as told by your health care provider and adjust them as directed. It is normal to feel mild stretching, pulling, tightness, or discomfort as you do these exercises, but you should stop right away if you feel sudden pain or your pain gets worse. Do not begin these exercises until told by your health care provider.  Stretching and range of motion exercises  These exercises warm up your muscles and joints and improve the movement and flexibility of your hips and your back. These exercises also help to relieve pain, numbness, and tingling.  Exercise A: Sciatic nerve glide  1. Sit in a chair with your head facing down toward your chest. Place your hands behind your back. Let your shoulders slump forward.  2. Slowly straighten one of your knees while you tilt your head back as if you are looking toward the ceiling. Only straighten your leg as far as you can without making your symptoms worse.  3. Hold for __________ seconds.  4. Slowly return to the starting position.  5. Repeat with your other leg.  Repeat __________ times. Complete this exercise __________ times a day.  Exercise B: Knee to chest with hip adduction and internal rotation    1. Lie on your back on a firm surface with both legs straight.  2. Bend one of your knees and move it up toward your chest until you feel a gentle stretch in your lower back and buttock. Then, move your knee toward the shoulder that is on the opposite side from your leg.  ? Hold your leg in this position by holding onto the front of your knee.  3. Hold for __________ seconds.  4. Slowly return to the starting position.  5. Repeat with your other leg.  Repeat __________ times. Complete this exercise __________ times a day.  Exercise C: Prone extension on elbows    1. Lie on your abdomen on a firm surface. A bed may be too soft for this exercise.  2. Prop yourself up on your  elbows.  3. Use your arms to help lift your chest up until you feel a gentle stretch in your abdomen and your lower back.  ? This will place some of your body weight on your elbows. If this is uncomfortable, try stacking pillows under your chest.  ? Your hips should stay down, against the surface that you are lying on. Keep your hip and back muscles relaxed.  4. Hold for __________ seconds.  5. Slowly relax your upper body and return to the starting position.  Repeat __________ times. Complete this exercise __________ times a day.  Strengthening exercises  These exercises build strength and endurance in your back. Endurance is the ability to use your muscles for a long time, even after they get tired.  Exercise D: Pelvic tilt  1. Lie on your back on a firm surface. Bend your knees and keep your feet flat.  2. Tense your abdominal muscles. Tip your pelvis up toward the ceiling and flatten your lower back into the floor.  ? To help with this exercise, you may place a small towel under your lower back and try to push your back into the towel.  3. Hold for __________ seconds.  4. Let your muscles relax completely before you repeat this exercise.  Repeat __________ times. Complete this exercise __________ times a day.  Exercise E: Alternating arm and leg raises    1. Get on your hands and knees on a firm surface. If you are on a hard floor, you may want to use padding to cushion your knees, such as an exercise mat.  2. Line up your arms and legs. Your hands should be below your shoulders, and your knees should be below your hips.  3. Lift your left leg behind you. At the same time, raise your right arm and straighten it in front of you.  ? Do not lift your leg higher than your hip.  ? Do not lift your arm higher than your shoulder.  ? Keep your abdominal and back muscles tight.  ? Keep your hips facing the ground.  ? Do not arch your back.  ? Keep your balance carefully, and do not hold your breath.  4. Hold for  __________ seconds.  5. Slowly return to the starting position and repeat with your right leg and your left arm.  Repeat __________ times. Complete this exercise __________ times a day.  Posture and body mechanics    Body mechanics refers to the movements and positions of your body while you do your daily activities. Posture is part of body mechanics. Good posture and healthy body mechanics can help to relieve stress in your body's tissues and joints. Good posture means that your spine is in its natural S-curve position (your spine is neutral), your shoulders are pulled back slightly, and your head is not tipped forward. The following are general guidelines for applying improved posture and body mechanics to your everyday activities.  Standing    · When standing, keep your spine neutral and your feet about hip-width apart. Keep a slight bend in your knees. Your ears, shoulders, and hips should line up.  · When you do a task in which you  one place for a long time, place one foot up on a stable object that is 2-4 inches (5-10 cm) high, such as a footstool. This helps keep your spine neutral.  Sitting    · When sitting, keep your spine neutral and keep your feet flat on the floor. Use a footrest, if necessary, and keep your thighs parallel to the floor. Avoid rounding your shoulders, and avoid tilting your head forward.  · When working at a desk or a computer, keep your desk at a height where your hands are slightly lower than your elbows. Slide your chair under your desk so you are close enough to maintain good posture.  · When working at a computer, place your monitor at a height where you are looking straight ahead and you do not have to tilt your head forward or downward to look at the screen.  Resting    · When lying down and resting, avoid positions that are most painful for you.  · If you have pain with activities such as sitting, bending, stooping, or squatting (flexion-based activities), lie in a  position in which your body does not bend very much. For example, avoid curling up on your side with your arms and knees near your chest (fetal position).  · If you have pain with activities such as standing for a long time or reaching with your arms (extension-based activities), lie with your spine in a neutral position and bend your knees slightly. Try the following positions:  ? Lying on your side with a pillow between your knees.  ? Lying on your back with a pillow under your knees.  Lifting    · When lifting objects, keep your feet at least shoulder-width apart and tighten your abdominal muscles.  · Bend your knees and hips and keep your spine neutral. It is important to lift using the strength of your legs, not your back. Do not lock your knees straight out.  · Always ask for help to lift heavy or awkward objects.  This information is not intended to replace advice given to you by your health care provider. Make sure you discuss any questions you have with your health care provider.  Document Released: 12/18/2006 Document Revised: 08/24/2017 Document Reviewed: 09/02/2016  [employer] Interactive Patient Education © 2019 [employer]

## 2019-10-04 NOTE — PROGRESS NOTES
Nicole Altman is a 39 y.o. female.     Subjective   History of Present Illness   Here today with concern of occasional RUQ abdominal pain for the last couple of months which seems to be triggered by certain movements such as twisting or reaching for her steering wheel. Stretching/elongating the abdomen and taking a deep breath before reaching helps alleviate the pain when it occurs but it always stays somewhat tender.  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  She reports a somewhat similar type of onset of pain which led to the diagnosis of adrenal mass in the past.  She does experience constipation predominantly and has previously been prescribed MiraLAX which she chooses not to use.  She denies diarrhea, nausea, vomiting, fever or chills.  No appreciable bulge in the area of pain.  Eating does not help or trigger symptoms.    On her last week of chantix and continues to smoke a few puffs per day.  She has also been using Nicorette lozenges periodically which helps with cravings.    Bipolar disorder: She feels as though Vraylar has been helpful with alleviating constant dysphoric mood.  Last week she tried to stop estradiol patches which adversely effected her mood so she resumed them and has been again feeling better.  She continues to take lithium and Lamictal as directed.  She denies SI or HI.    Today she also reports that her right hip and knee have been aching at night for the last 2 weeks or so.  She also endorses increased right sided low back pain in the same amount of time.  She endorses mild numbness and tingling intermittently of the right leg.  No weakness or loss of bowel/bladder control.  She does have a history of lumbar vertebral fracture from a fall.      The following portions of the patient's history were reviewed and updated as appropriate: allergies, current medications, past family history, past medical history, past  social history, past surgical history and problem list.    Review of Systems   Constitutional: Negative for activity change, appetite change, chills, diaphoresis, fatigue, fever and unexpected weight change.   Eyes: Negative for visual disturbance.   Respiratory: Negative for cough, chest tightness, shortness of breath, wheezing and stridor.    Cardiovascular: Negative for chest pain, palpitations and leg swelling.   Gastrointestinal: Positive for abdominal pain and constipation. Negative for abdominal distention, anal bleeding, blood in stool, diarrhea, nausea, rectal pain and vomiting.   Endocrine: Negative for cold intolerance, heat intolerance, polydipsia, polyphagia and polyuria.   Genitourinary: Negative.    Musculoskeletal: Positive for arthralgias and back pain. Negative for gait problem, joint swelling, myalgias and neck stiffness.   Skin: Negative for rash.   Allergic/Immunologic: Negative for immunocompromised state.   Neurological: Positive for numbness. Negative for dizziness, tremors, facial asymmetry, speech difficulty, weakness, light-headedness and headaches.   Hematological: Negative for adenopathy. Does not bruise/bleed easily.   Psychiatric/Behavioral: Positive for agitation and dysphoric mood. Negative for confusion, decreased concentration, self-injury and suicidal ideas. The patient is nervous/anxious. The patient is not hyperactive.          Objective    Physical Exam   Constitutional: She is oriented to person, place, and time. She appears well-developed and well-nourished. No distress.   Obese   HENT:   Head: Normocephalic and atraumatic.   Mouth/Throat: Oropharynx is clear and moist.   Eyes: Conjunctivae and EOM are normal. Pupils are equal, round, and reactive to light.   Neck: Normal range of motion. Neck supple.   Cardiovascular: Normal rate, regular rhythm and normal heart sounds. Exam reveals no gallop and no friction rub.   No murmur heard.  Pulmonary/Chest: Effort normal and breath  "sounds normal. No stridor. No respiratory distress. She has no wheezes. She has no rales. She exhibits no tenderness.   Abdominal: Soft. Bowel sounds are normal. She exhibits no distension and no mass. There is tenderness (RUQ only). There is no rebound and no guarding. No hernia.   Musculoskeletal: Normal range of motion. She exhibits no edema, tenderness or deformity.   Lymphadenopathy:     She has no cervical adenopathy.   Neurological: She is alert and oriented to person, place, and time. She displays normal reflexes. No cranial nerve deficit or sensory deficit. She exhibits normal muscle tone. Coordination normal.   Skin: Skin is warm and dry. Capillary refill takes less than 2 seconds. No rash noted. She is not diaphoretic.   Psychiatric: She has a normal mood and affect. Her behavior is normal. Judgment and thought content normal.   Nursing note and vitals reviewed.        /74   Pulse 77   Temp 97.8 °F (36.6 °C) (Temporal)   Ht 170.2 cm (67.01\")   Wt 124 kg (274 lb)   SpO2 99%   BMI 42.90 kg/m²     Nursing note and vitals reviewed.        Assessment/Plan   Nicole was seen today for hernia.    Diagnoses and all orders for this visit:    RUQ pain  -     US Gallbladder  Will evaluate initially with ultrasound before consideration of more advanced imaging.    History of total adrenalectomy (CMS/HCC)  -     US Gallbladder    Gastroesophageal reflux disease, esophagitis presence not specified  -     famotidine (PEPCID) 40 MG tablet; Take 1 tablet by mouth Daily.  Changed from ranitidine due to lack of efficacy.  If Pepcid also fails she will be tried on a PPI.    Acute right-sided low back pain with right-sided sciatica  Discussed exercises and stretches to perform at home.  She declined a physical therapy referral today.    Cigarette smoker  -     nicotine polacrilex (NICORETTE STARTER KIT) 2 MG gum; Chew 1 each Every 2 (Two) Hours As Needed for Smoking Cessation.  She was encouraged to continue " Chantix for 1 additional month before considering discontinuation.        ER with any acutely worsened abdominal pain.

## 2019-10-09 ENCOUNTER — DOCUMENTATION (OUTPATIENT)
Dept: INTERNAL MEDICINE | Facility: CLINIC | Age: 39
End: 2019-10-09

## 2019-10-09 RX ORDER — BENZTROPINE MESYLATE 1 MG/1
1 TABLET ORAL 2 TIMES DAILY
Qty: 28 TABLET | Refills: 0 | Status: SHIPPED | OUTPATIENT
Start: 2019-10-09 | End: 2019-10-23

## 2019-10-15 ENCOUNTER — APPOINTMENT (OUTPATIENT)
Dept: ULTRASOUND IMAGING | Facility: HOSPITAL | Age: 39
End: 2019-10-15

## 2019-10-21 DIAGNOSIS — R12 HEARTBURN: ICD-10-CM

## 2019-10-21 DIAGNOSIS — R93.3 ABNORMAL CT SCAN, COLON: Primary | ICD-10-CM

## 2019-10-21 RX ORDER — BENZTROPINE MESYLATE 1 MG/1
1 TABLET ORAL 2 TIMES DAILY
Qty: 28 TABLET | Refills: 0 | OUTPATIENT
Start: 2019-10-21 | End: 2019-11-04

## 2019-10-21 RX ORDER — PANTOPRAZOLE SODIUM 40 MG/1
40 TABLET, DELAYED RELEASE ORAL DAILY
Qty: 30 TABLET | Refills: 5 | Status: SHIPPED | OUTPATIENT
Start: 2019-10-21 | End: 2020-01-30 | Stop reason: SDUPTHER

## 2019-10-25 DIAGNOSIS — F31.60 BIPOLAR DISORDER, MIXED (HCC): ICD-10-CM

## 2019-10-25 RX ORDER — ESTRADIOL 0.05 MG/D
FILM, EXTENDED RELEASE TRANSDERMAL
Refills: 11 | COMMUNITY
Start: 2019-10-12 | End: 2020-02-07 | Stop reason: SDUPTHER

## 2019-10-29 ENCOUNTER — HOSPITAL ENCOUNTER (OUTPATIENT)
Dept: GENERAL RADIOLOGY | Facility: HOSPITAL | Age: 39
Discharge: HOME | End: 2019-10-29

## 2019-10-29 ENCOUNTER — HOSPITAL ENCOUNTER (OUTPATIENT)
Dept: ULTRASOUND IMAGING | Facility: HOSPITAL | Age: 39
Discharge: HOME | End: 2019-10-29
Admitting: PHYSICIAN ASSISTANT

## 2019-10-29 DIAGNOSIS — R07.81 RIB PAIN ON RIGHT SIDE: Primary | ICD-10-CM

## 2019-10-29 PROCEDURE — 76705 ECHO EXAM OF ABDOMEN: CPT

## 2019-10-29 PROCEDURE — 71101 X-RAY EXAM UNILAT RIBS/CHEST: CPT

## 2019-10-31 ENCOUNTER — DOCUMENTATION (OUTPATIENT)
Dept: INTERNAL MEDICINE | Facility: CLINIC | Age: 39
End: 2019-10-31

## 2019-10-31 RX ORDER — BENZTROPINE MESYLATE 0.5 MG/1
0.5 TABLET ORAL 2 TIMES DAILY
Qty: 28 TABLET | Refills: 0 | Status: SHIPPED | OUTPATIENT
Start: 2019-10-31 | End: 2019-11-11 | Stop reason: SDUPTHER

## 2019-11-01 DIAGNOSIS — E66.09 OBESITY DUE TO EXCESS CALORIES WITH SERIOUS COMORBIDITY, UNSPECIFIED CLASSIFICATION: Primary | ICD-10-CM

## 2019-11-01 DIAGNOSIS — E66.01 CLASS 3 SEVERE OBESITY DUE TO EXCESS CALORIES WITH SERIOUS COMORBIDITY AND BODY MASS INDEX (BMI) OF 40.0 TO 44.9 IN ADULT (HCC): ICD-10-CM

## 2019-11-04 DIAGNOSIS — F17.200 CURRENT SMOKER: ICD-10-CM

## 2019-11-04 RX ORDER — VARENICLINE TARTRATE 1 MG/1
1 TABLET, FILM COATED ORAL 2 TIMES DAILY
Qty: 60 TABLET | Refills: 5 | Status: SHIPPED | OUTPATIENT
Start: 2019-11-04 | End: 2020-03-05

## 2019-11-11 ENCOUNTER — OFFICE VISIT (OUTPATIENT)
Dept: INTERNAL MEDICINE | Facility: CLINIC | Age: 39
End: 2019-11-11

## 2019-11-11 VITALS
TEMPERATURE: 98.7 F | HEART RATE: 92 BPM | WEIGHT: 275 LBS | BODY MASS INDEX: 43.16 KG/M2 | SYSTOLIC BLOOD PRESSURE: 118 MMHG | OXYGEN SATURATION: 98 % | DIASTOLIC BLOOD PRESSURE: 80 MMHG | HEIGHT: 67 IN

## 2019-11-11 DIAGNOSIS — F31.60 BIPOLAR DISORDER, MIXED (HCC): ICD-10-CM

## 2019-11-11 DIAGNOSIS — R29.818 EXTRAPYRAMIDAL SYMPTOM: ICD-10-CM

## 2019-11-11 DIAGNOSIS — F31.60 BIPOLAR DISORDER, MIXED (HCC): Primary | ICD-10-CM

## 2019-11-11 PROCEDURE — 99213 OFFICE O/P EST LOW 20 MIN: CPT | Performed by: PHYSICIAN ASSISTANT

## 2019-11-11 RX ORDER — BENZTROPINE MESYLATE 1 MG/1
1 TABLET ORAL 2 TIMES DAILY
Qty: 60 TABLET | Refills: 0 | Status: CANCELLED | OUTPATIENT
Start: 2019-11-11

## 2019-11-11 RX ORDER — BENZTROPINE MESYLATE 1 MG/1
1 TABLET ORAL 2 TIMES DAILY
Qty: 60 TABLET | Refills: 0 | Status: SHIPPED | OUTPATIENT
Start: 2019-11-11 | End: 2019-12-09 | Stop reason: SDUPTHER

## 2019-11-11 NOTE — PROGRESS NOTES
Nicole Altman is a 39 y.o. female.     Subjective   History of Present Illness   Here today for follow-up of depression.  She is currently taking Vraylar 1.5 mg daily which initially seemed helpful but there was notable room for improvement.  Increasing Vraylar to 3 mg was attempted but produced unfavorable side effects of restlessness and insomnia despite Cogentin use.  She did have improved depression symptoms when Vraylar was increased to 3 mg and feels as though the 1.5 mg dose does not sufficiently control depression.  Since starting Vraylar she has felt that her mind is very clear which is a positive change however people around her have told her that she seems to have some short-term memory impairment since starting Vraylar as she often repeats herself unnecessarily.  In the last few days she has had increased episodes of crying and dysphoric mood.  She changed her estradiol patch with no improvement in symptoms.  She denies SI or HI.         The following portions of the patient's history were reviewed and updated as appropriate: allergies, current medications, past family history, past medical history, past social history, past surgical history and problem list.    Review of Systems   Constitutional: Positive for fatigue (due to insomnia). Negative for activity change, appetite change, chills, diaphoresis, fever and unexpected weight change.   Eyes: Negative for visual disturbance.   Respiratory: Negative for cough, chest tightness, shortness of breath and wheezing.    Cardiovascular: Negative for chest pain, palpitations and leg swelling.   Endocrine: Negative for cold intolerance, heat intolerance, polydipsia, polyphagia and polyuria.   Skin: Negative.    Allergic/Immunologic: Negative for immunocompromised state.   Neurological: Negative for dizziness, tremors, seizures, syncope, weakness, numbness and headaches.   Hematological: Negative for adenopathy. Does not bruise/bleed easily.    Psychiatric/Behavioral: Positive for agitation, behavioral problems, dysphoric mood and sleep disturbance. Negative for confusion, decreased concentration, hallucinations, self-injury and suicidal ideas. The patient is nervous/anxious and is hyperactive (restlessness).          Objective    Physical Exam   Constitutional: She is oriented to person, place, and time. She appears well-developed and well-nourished. No distress.   Obese.    HENT:   Head: Normocephalic and atraumatic.   Eyes: Conjunctivae and EOM are normal. Pupils are equal, round, and reactive to light.   Neck: Normal range of motion. Neck supple.   Cardiovascular: Normal rate, regular rhythm and normal heart sounds. Exam reveals no gallop and no friction rub.   No murmur heard.  Pulmonary/Chest: Effort normal and breath sounds normal. No respiratory distress. She has no wheezes. She has no rales.   Abdominal: Soft. Bowel sounds are normal. There is no tenderness.   Musculoskeletal: Normal range of motion. She exhibits no edema, tenderness or deformity.   Neurological: She is alert and oriented to person, place, and time. She displays normal reflexes. No cranial nerve deficit or sensory deficit. She exhibits normal muscle tone. Coordination normal.   Skin: Skin is warm and dry. Capillary refill takes less than 2 seconds. No rash noted. She is not diaphoretic.   Psychiatric: Her speech is normal. Judgment and thought content normal. Her mood appears anxious. Her affect is not angry, not blunt, not labile and not inappropriate. She is hyperactive (restless). She is not agitated, not aggressive, not slowed, not withdrawn, not actively hallucinating and not combative. Thought content is not paranoid and not delusional. Cognition and memory are impaired. She does not express impulsivity or inappropriate judgment. She exhibits a depressed mood. She expresses no homicidal and no suicidal ideation. She expresses no suicidal plans and no homicidal plans. She  "exhibits abnormal recent memory (likely related to recent ECT). She exhibits normal remote memory. She is attentive.   Nursing note and vitals reviewed.        /80   Pulse 92   Temp 98.7 °F (37.1 °C)   Ht 170.2 cm (67.01\")   Wt 125 kg (275 lb)   SpO2 98%   BMI 43.06 kg/m²     Nursing note and vitals reviewed.        Assessment/Plan   Nicole was seen today for depression.    Diagnoses and all orders for this visit:    Bipolar disorder, mixed (CMS/[facility])  Continue Vraylar 1.5 mg daily. If cogentin dose increase helps restlessness we may attempt to increase Vraylar dose by alternating 1.5 mg and 3 mg every other day.         -      ambulatory referral to Psychiatry    Extrapyramidal symptom  Increase Cogentin to 1 mg twice daily.         If she experiences any worsened symptoms she will seek treatment at [facility] in [place]. She is not in favor of going to the ER with worsened symptoms due to past unfavorable experiences, but was encouraged to proceed to the ER with any uncontrollable symptoms or with development of SI or HI.              "

## 2019-11-25 RX ORDER — IRBESARTAN 300 MG/1
TABLET ORAL
Qty: 30 TABLET | Refills: 3 | Status: SHIPPED | OUTPATIENT
Start: 2019-11-25 | End: 2020-02-07 | Stop reason: SDUPTHER

## 2019-12-05 DIAGNOSIS — F31.10 BIPOLAR I DISORDER WITH MANIA (HCC): ICD-10-CM

## 2019-12-06 RX ORDER — LITHIUM CARBONATE 300 MG/1
CAPSULE ORAL
Qty: 90 CAPSULE | Refills: 5 | Status: SHIPPED | OUTPATIENT
Start: 2019-12-06 | End: 2020-01-15 | Stop reason: SDUPTHER

## 2019-12-09 RX ORDER — BENZTROPINE MESYLATE 1 MG/1
1 TABLET ORAL 2 TIMES DAILY
Qty: 60 TABLET | Refills: 2 | Status: SHIPPED | OUTPATIENT
Start: 2019-12-09 | End: 2020-01-15 | Stop reason: SDUPTHER

## 2020-01-15 DIAGNOSIS — F31.10 BIPOLAR I DISORDER WITH MANIA (HCC): ICD-10-CM

## 2020-01-15 RX ORDER — LITHIUM CARBONATE 300 MG/1
CAPSULE ORAL
Qty: 90 CAPSULE | Refills: 5 | Status: SHIPPED | OUTPATIENT
Start: 2020-01-15 | End: 2020-03-05 | Stop reason: SDUPTHER

## 2020-01-15 RX ORDER — BENZTROPINE MESYLATE 1 MG/1
1 TABLET ORAL 2 TIMES DAILY
Qty: 60 TABLET | Refills: 2 | Status: SHIPPED | OUTPATIENT
Start: 2020-01-15 | End: 2020-03-05 | Stop reason: SDUPTHER

## 2020-01-30 DIAGNOSIS — R12 HEARTBURN: ICD-10-CM

## 2020-01-30 DIAGNOSIS — R11.0 NAUSEA: ICD-10-CM

## 2020-01-30 RX ORDER — PANTOPRAZOLE SODIUM 40 MG/1
40 TABLET, DELAYED RELEASE ORAL DAILY
Qty: 30 TABLET | Refills: 5 | Status: SHIPPED | OUTPATIENT
Start: 2020-01-30 | End: 2020-03-02 | Stop reason: SDUPTHER

## 2020-01-30 RX ORDER — LAMOTRIGINE 150 MG/1
150 TABLET ORAL 2 TIMES DAILY
Qty: 60 TABLET | Refills: 5 | Status: SHIPPED | OUTPATIENT
Start: 2020-01-30 | End: 2020-03-02 | Stop reason: SDUPTHER

## 2020-01-30 RX ORDER — ONDANSETRON 8 MG/1
TABLET, ORALLY DISINTEGRATING ORAL
Qty: 12 TABLET | Refills: 5 | Status: SHIPPED | OUTPATIENT
Start: 2020-01-30 | End: 2020-03-05 | Stop reason: SDUPTHER

## 2020-02-07 RX ORDER — ESTRADIOL 0.05 MG/D
1 FILM, EXTENDED RELEASE TRANSDERMAL 2 TIMES WEEKLY
Qty: 8 PATCH | Refills: 5 | Status: SHIPPED | OUTPATIENT
Start: 2020-02-10 | End: 2020-02-11 | Stop reason: ALTCHOICE

## 2020-02-07 RX ORDER — IRBESARTAN 300 MG/1
300 TABLET ORAL NIGHTLY
Qty: 30 TABLET | Refills: 11 | Status: SHIPPED | OUTPATIENT
Start: 2020-02-07 | End: 2020-02-11 | Stop reason: SDUPTHER

## 2020-02-11 RX ORDER — ESTRADIOL 0.5 MG/1
TABLET ORAL
Qty: 21 TABLET | Refills: 11 | Status: SHIPPED | OUTPATIENT
Start: 2020-02-11 | End: 2020-02-24 | Stop reason: SDUPTHER

## 2020-02-11 RX ORDER — IRBESARTAN 300 MG/1
300 TABLET ORAL NIGHTLY
Qty: 30 TABLET | Refills: 11 | Status: SHIPPED | OUTPATIENT
Start: 2020-02-11 | End: 2021-01-06

## 2020-02-24 RX ORDER — ESTRADIOL 0.5 MG/1
0.5 TABLET ORAL DAILY
Qty: 30 TABLET | Refills: 11 | Status: SHIPPED | OUTPATIENT
Start: 2020-02-24 | End: 2020-12-14

## 2020-03-02 DIAGNOSIS — R12 HEARTBURN: ICD-10-CM

## 2020-03-02 RX ORDER — LAMOTRIGINE 150 MG/1
150 TABLET ORAL 2 TIMES DAILY
Qty: 60 TABLET | Refills: 5 | Status: SHIPPED | OUTPATIENT
Start: 2020-03-02 | End: 2020-09-06 | Stop reason: SDUPTHER

## 2020-03-02 RX ORDER — PANTOPRAZOLE SODIUM 40 MG/1
40 TABLET, DELAYED RELEASE ORAL DAILY
Qty: 30 TABLET | Refills: 5 | Status: SHIPPED | OUTPATIENT
Start: 2020-03-02 | End: 2020-09-11

## 2020-03-05 ENCOUNTER — OFFICE VISIT (OUTPATIENT)
Dept: INTERNAL MEDICINE | Facility: CLINIC | Age: 40
End: 2020-03-05

## 2020-03-05 VITALS
TEMPERATURE: 98.2 F | WEIGHT: 269 LBS | DIASTOLIC BLOOD PRESSURE: 84 MMHG | BODY MASS INDEX: 42.22 KG/M2 | OXYGEN SATURATION: 99 % | HEART RATE: 96 BPM | SYSTOLIC BLOOD PRESSURE: 118 MMHG | HEIGHT: 67 IN

## 2020-03-05 DIAGNOSIS — E16.2 HYPOGLYCEMIA: ICD-10-CM

## 2020-03-05 DIAGNOSIS — M54.9 UPPER BACK PAIN: Primary | ICD-10-CM

## 2020-03-05 DIAGNOSIS — M79.604 LEG PAIN, BILATERAL: ICD-10-CM

## 2020-03-05 DIAGNOSIS — I10 ESSENTIAL HYPERTENSION: ICD-10-CM

## 2020-03-05 DIAGNOSIS — F31.60 BIPOLAR DISORDER, MIXED (HCC): ICD-10-CM

## 2020-03-05 DIAGNOSIS — F17.210 CIGARETTE SMOKER: ICD-10-CM

## 2020-03-05 DIAGNOSIS — M79.605 LEG PAIN, BILATERAL: ICD-10-CM

## 2020-03-05 DIAGNOSIS — G43.009 MIGRAINE WITHOUT AURA AND WITHOUT STATUS MIGRAINOSUS, NOT INTRACTABLE: ICD-10-CM

## 2020-03-05 PROCEDURE — 99214 OFFICE O/P EST MOD 30 MIN: CPT | Performed by: PHYSICIAN ASSISTANT

## 2020-03-05 RX ORDER — LITHIUM CARBONATE 300 MG/1
CAPSULE ORAL
Qty: 90 CAPSULE | Refills: 5 | Status: SHIPPED | OUTPATIENT
Start: 2020-03-05 | End: 2020-12-02

## 2020-03-05 RX ORDER — METOPROLOL SUCCINATE 100 MG/1
100 TABLET, EXTENDED RELEASE ORAL DAILY
Qty: 90 TABLET | Refills: 5 | Status: ON HOLD | OUTPATIENT
Start: 2020-03-05 | End: 2021-01-10

## 2020-03-05 RX ORDER — ALBUTEROL SULFATE 90 UG/1
2 AEROSOL, METERED RESPIRATORY (INHALATION) EVERY 4 HOURS PRN
Qty: 1 INHALER | Refills: 11 | Status: SHIPPED | OUTPATIENT
Start: 2020-03-05 | End: 2021-03-08 | Stop reason: SDUPTHER

## 2020-03-05 RX ORDER — BENZTROPINE MESYLATE 1 MG/1
1 TABLET ORAL 2 TIMES DAILY
Qty: 60 TABLET | Refills: 5 | Status: SHIPPED | OUTPATIENT
Start: 2020-03-05 | End: 2020-09-01 | Stop reason: SDUPTHER

## 2020-03-05 RX ORDER — SUMATRIPTAN 50 MG/1
TABLET, FILM COATED ORAL
Qty: 9 TABLET | Refills: 4 | Status: SHIPPED | OUTPATIENT
Start: 2020-03-05 | End: 2022-04-22 | Stop reason: SDUPTHER

## 2020-03-05 RX ORDER — ONDANSETRON 8 MG/1
TABLET, ORALLY DISINTEGRATING ORAL
Qty: 12 TABLET | Refills: 5 | Status: SHIPPED | OUTPATIENT
Start: 2020-03-05 | End: 2020-09-24 | Stop reason: SDUPTHER

## 2020-03-05 NOTE — PROGRESS NOTES
Nicole Altman is a 39 y.o. female.     Subjective   History of Present Illness   Here today with concern of upper back pain.  She describes a dull, intense pressure-like pain in the upper thoracic area which feels like it starts midline then spreads to both upper arms and up around the throat. This has been happening several times per week and occasionally several times per day for the 2 months.  Laying down flat on her back and applying pressure to the upper back helps alleviate the pain which lasts just a few minutes. No numbness, tingling or weakness. She does get SOA from the pain and also sometimes gets a little SOA with exertion which has been ongoing for the last year or more and is unchanged.  She endorses some increased dysphagia in recent months.  No leg swelling but she has been experiencing mild aches in the legs consistently for the last month or so which are more bothersome at night.  Blood pressure has been well controlled with metoprolol and irbesartan.  She denies orthopnea, palpitations, headaches, weakness, visual disturbances or confusion. She plans to begin exercising today to help lose weight. She failed chantix and is again smoking 1 ppd as of 12/30/19. She has been treated with estradiol patches for the last year or more for management of PMDD symptoms, however around 1 month ago she was transitioned to estradiol 0.5 mg tablet daily due to cost. Acid reflux is well controlled with pantoprazole. She has experienced occasional sharp left-sided chest pains lasting around 1 second for many years which have been unchanged.     Bipolar disorder seems well controlled with Vraylar, Lamictal and Lithium. Cogentin helps restlessness secondary to Vraylar use. No recent SI or HI. She endorses some sleep disturbances due to pain in legs and restlessness.     She has been experiencing occasional episodes of feeling shaky, lightheaded and nauseous which resolves with eating. No vomiting, diarrhea,  syncope, polyuria, polydipsia, polyphagia, visual disturbances or weakness.     Most recent menses has lasted 3 weeks. She feels the bleeding has ceased as of yesterday. PMDD symptoms are currently better controlled with estradiol PO than previously with estradiol patches.             The following portions of the patient's history were reviewed and updated as appropriate: allergies, current medications, past family history, past medical history, past social history, past surgical history and problem list.    Review of Systems   Constitutional: Negative for activity change, appetite change, chills, diaphoresis, fatigue, fever and unexpected weight change.   HENT: Positive for trouble swallowing. Negative for congestion, drooling, ear pain, facial swelling, hearing loss, mouth sores, nosebleeds, postnasal drip, rhinorrhea, sinus pain, sneezing, sore throat and tinnitus.    Eyes: Negative for photophobia, discharge and visual disturbance.   Respiratory: Negative for cough, chest tightness, shortness of breath and wheezing.    Cardiovascular: Positive for chest pain. Negative for palpitations and leg swelling.   Gastrointestinal: Positive for nausea. Negative for abdominal distention, abdominal pain, blood in stool, constipation, diarrhea and vomiting.   Endocrine: Negative for cold intolerance, heat intolerance, polydipsia, polyphagia and polyuria.   Genitourinary: Positive for menstrual problem (3 week menses). Negative for decreased urine volume, difficulty urinating, dysuria, enuresis, genital sores, vaginal discharge and vaginal pain.   Musculoskeletal: Positive for arthralgias, back pain and myalgias. Negative for joint swelling, neck pain and neck stiffness.   Skin: Negative for color change, pallor, rash and wound.   Allergic/Immunologic: Negative for food allergies and immunocompromised state.   Neurological: Positive for tremors (shaky episodes) and light-headedness. Negative for dizziness, seizures,  weakness, numbness and headaches.   Hematological: Negative for adenopathy. Does not bruise/bleed easily.   Psychiatric/Behavioral: Positive for agitation (stable), dysphoric mood (stable) and sleep disturbance. Negative for behavioral problems, confusion, hallucinations, self-injury and suicidal ideas. The patient is nervous/anxious (stable).          Objective    Physical Exam   Constitutional: She is oriented to person, place, and time. She appears well-developed and well-nourished. No distress.   HENT:   Head: Normocephalic and atraumatic.   Mouth/Throat: Oropharynx is clear and moist. No oropharyngeal exudate.   Eyes: Pupils are equal, round, and reactive to light. Conjunctivae are normal. No scleral icterus.   Neck: Normal range of motion. Neck supple. No thyromegaly present.   Cardiovascular: Normal rate, regular rhythm and normal heart sounds. Exam reveals no gallop and no friction rub.   No murmur heard.  Pulmonary/Chest: Effort normal and breath sounds normal. No respiratory distress. She has no wheezes. She has no rales. She exhibits no tenderness.   Abdominal: Soft. Bowel sounds are normal. She exhibits no distension and no mass. There is no tenderness. There is no rebound.   Musculoskeletal: Normal range of motion. She exhibits edema (trace edema bilateral lower legs) and tenderness. She exhibits no deformity.        Thoracic back: She exhibits bony tenderness (moderate spinous process tenderness of C4-T2 region. ) and pain. She exhibits normal range of motion, no tenderness ( no paraspinal muscle tenderness), no swelling, no edema, no deformity, no laceration, no spasm and normal pulse.        Back:         Left lower leg: She exhibits tenderness and swelling. She exhibits no bony tenderness, no edema, no deformity and no laceration.        Legs:  Lymphadenopathy:     She has no cervical adenopathy.   Neurological: She is alert and oriented to person, place, and time. She has normal strength. She is  "not disoriented. She displays normal reflexes. No cranial nerve deficit or sensory deficit. She exhibits normal muscle tone. She displays no seizure activity. Coordination normal.   Skin: Skin is warm and dry. Capillary refill takes less than 2 seconds. No rash noted. She is not diaphoretic. No pallor.   Psychiatric: Her speech is normal. Judgment and thought content normal. Her mood appears anxious. Her affect is not angry, not blunt, not labile and not inappropriate. She is hyperactive. She is not actively hallucinating. Thought content is not paranoid and not delusional. Cognition and memory are normal. She does not express impulsivity or inappropriate judgment. She does not exhibit a depressed mood. She expresses no homicidal and no suicidal ideation. She expresses no suicidal plans and no homicidal plans. She is attentive.   Nursing note and vitals reviewed.        /84   Pulse 96   Temp 98.2 °F (36.8 °C)   Ht 170.2 cm (67.01\")   Wt 122 kg (269 lb)   SpO2 99%   BMI 42.12 kg/m²     Nursing note and vitals reviewed.          Assessment/Plan   Nicole was seen today for back pain.    Diagnoses and all orders for this visit:    Upper back pain  Leg pain, bilateral  Cigarette smoker  -     D-dimer, Quantitative    Concern for DVT and/or PE given increased risk secondary to smoking and estradiol use.   Minimally positive Homans sign in the left leg. Due to significant anxiety she was not in favor of having a CT to rule out PE or bilateral leg ultrasound to rule out DVT at this time.      *She is agreeable to proceeding to the ER should the d-dimer returned positive.    She was again strongly encouraged to quit smoking as it greatly increases her risk for blood clots particularly in the presence of estradiol use.  She voiced understanding of this concern however she declined further smoking cessation assistance as she has previously tried and failed Chantix, Wellbutrin, nicotine patches and nicotine gum.  At " this time she feels that the benefits of continued use of estradiol outweighs its potential risks.    Bipolar disorder, mixed (CMS/HCC)  -     lithium carbonate 300 MG capsule; Take 1 capsule every morning and 2 capsules every evening.  -     Cariprazine HCl (VRAYLAR) 1.5 MG capsule capsule; Take 1 capsule by mouth Daily.  -     benztropine (COGENTIN) 1 MG tablet; Take 1 tablet by mouth 2 (Two) Times a Day.  -     CBC & Differential  -     Comprehensive Metabolic Panel  -     TSH  -     T4, Free  Bipolar disorder currently well controlled, continue Vraylar, lithium and Lamictal.  Continue Cogentin twice daily as needed for restlessness secondary to Vraylar use.    She declined referral to behavioral health for medication management at this time.    Essential hypertension  -     metoprolol succinate XL (TOPROL-XL) 100 MG 24 hr tablet; Take 1 tablet by mouth Daily.  -     Comprehensive Metabolic Panel  -     TSH  -     T4, Free  Well controlled with irbesartan and metoprolol, continue both.    Follow heart healthy/low salt diet.  Avoid processed foods. Monitor blood pressure as discussed.  Exercise as tolerated up to 30 minutes 5 days per week.  Take medications as prescribed.    Migraine without aura and without status migrainosus, not intractable  -     Continue PRN: SUMAtriptan (IMITREX) 50 MG tablet; Take one tablet at onset of headache. May repeat dose one time in 2 hours if headache not relieved.    Hypoglycemia, suspected (episodes of shakiness, lightheadedness and nausea which resolve with eating)  -     Comprehensive Metabolic Panel  -     Hemoglobin A1c    Other orders (refills)  -     ondansetron ODT (ZOFRAN ODT) 8 MG disintegrating tablet; Dissolve 1/2 to 1 tablet SL every 8 hours as needed for nausea.  Indications: nausea  -     albuterol sulfate  (90 Base) MCG/ACT inhaler; Inhale 2 puffs Every 4 (Four) Hours As Needed for Wheezing or Shortness of Air. Indications: Asthma        Return in around 3  months for Welcome to Medicare visit or sooner if needed.

## 2020-03-06 DIAGNOSIS — M54.9 UPPER BACK PAIN: Primary | ICD-10-CM

## 2020-03-06 PROBLEM — R73.03 PREDIABETES: Status: ACTIVE | Noted: 2020-03-06

## 2020-03-06 LAB
ALBUMIN SERPL-MCNC: 4 G/DL (ref 3.5–5.2)
ALBUMIN/GLOB SERPL: 1.4 G/DL
ALP SERPL-CCNC: 90 U/L (ref 39–117)
ALT SERPL-CCNC: 8 U/L (ref 1–33)
AST SERPL-CCNC: 11 U/L (ref 1–32)
BASOPHILS # BLD AUTO: 0.06 10*3/MM3 (ref 0–0.2)
BASOPHILS NFR BLD AUTO: 0.6 % (ref 0–1.5)
BILIRUB SERPL-MCNC: 0.3 MG/DL (ref 0.2–1.2)
BUN SERPL-MCNC: 6 MG/DL (ref 6–20)
BUN/CREAT SERPL: 6.1 (ref 7–25)
CALCIUM SERPL-MCNC: 9.4 MG/DL (ref 8.6–10.5)
CHLORIDE SERPL-SCNC: 103 MMOL/L (ref 98–107)
CO2 SERPL-SCNC: 23.9 MMOL/L (ref 22–29)
CREAT SERPL-MCNC: 0.99 MG/DL (ref 0.57–1)
D DIMER PPP FEU-MCNC: 0.34 MCGFEU/ML (ref 0–0.57)
EOSINOPHIL # BLD AUTO: 0.42 10*3/MM3 (ref 0–0.4)
EOSINOPHIL NFR BLD AUTO: 4.2 % (ref 0.3–6.2)
ERYTHROCYTE [DISTWIDTH] IN BLOOD BY AUTOMATED COUNT: 13.6 % (ref 12.3–15.4)
GLOBULIN SER CALC-MCNC: 2.8 GM/DL
GLUCOSE SERPL-MCNC: 97 MG/DL (ref 65–99)
HBA1C MFR BLD: 5.9 % (ref 4.8–5.6)
HCT VFR BLD AUTO: 36.7 % (ref 34–46.6)
HGB BLD-MCNC: 11.9 G/DL (ref 12–15.9)
IMM GRANULOCYTES # BLD AUTO: 0.04 10*3/MM3 (ref 0–0.05)
IMM GRANULOCYTES NFR BLD AUTO: 0.4 % (ref 0–0.5)
LYMPHOCYTES # BLD AUTO: 2.55 10*3/MM3 (ref 0.7–3.1)
LYMPHOCYTES NFR BLD AUTO: 25.7 % (ref 19.6–45.3)
MCH RBC QN AUTO: 27.3 PG (ref 26.6–33)
MCHC RBC AUTO-ENTMCNC: 32.4 G/DL (ref 31.5–35.7)
MCV RBC AUTO: 84.2 FL (ref 79–97)
MONOCYTES # BLD AUTO: 0.59 10*3/MM3 (ref 0.1–0.9)
MONOCYTES NFR BLD AUTO: 5.9 % (ref 5–12)
NEUTROPHILS # BLD AUTO: 6.28 10*3/MM3 (ref 1.7–7)
NEUTROPHILS NFR BLD AUTO: 63.2 % (ref 42.7–76)
NRBC BLD AUTO-RTO: 0.1 /100 WBC (ref 0–0.2)
PLATELET # BLD AUTO: 349 10*3/MM3 (ref 140–450)
POTASSIUM SERPL-SCNC: 4.5 MMOL/L (ref 3.5–5.2)
PROT SERPL-MCNC: 6.8 G/DL (ref 6–8.5)
RBC # BLD AUTO: 4.36 10*6/MM3 (ref 3.77–5.28)
SODIUM SERPL-SCNC: 139 MMOL/L (ref 136–145)
T4 FREE SERPL-MCNC: 1.15 NG/DL (ref 0.93–1.7)
TSH SERPL DL<=0.005 MIU/L-ACNC: 2.25 UIU/ML (ref 0.27–4.2)
WBC # BLD AUTO: 9.94 10*3/MM3 (ref 3.4–10.8)

## 2020-03-21 ENCOUNTER — HOSPITAL ENCOUNTER (EMERGENCY)
Facility: HOSPITAL | Age: 40
Discharge: HOME | End: 2020-03-21
Attending: EMERGENCY MEDICINE | Admitting: EMERGENCY MEDICINE

## 2020-03-21 VITALS
TEMPERATURE: 98.7 F | RESPIRATION RATE: 18 BRPM | HEART RATE: 87 BPM | DIASTOLIC BLOOD PRESSURE: 92 MMHG | OXYGEN SATURATION: 100 % | SYSTOLIC BLOOD PRESSURE: 108 MMHG

## 2020-03-21 DIAGNOSIS — R42 VERTIGO: Primary | ICD-10-CM

## 2020-03-21 LAB
ALBUMIN SERPL-MCNC: 4.4 G/DL (ref 3.5–5.2)
ALBUMIN/GLOB SERPL: 1.1 G/DL
ALP SERPL-CCNC: 100 U/L (ref 39–117)
ALT SERPL W P-5'-P-CCNC: 8 U/L (ref 1–33)
ANION GAP SERPL CALCULATED.3IONS-SCNC: 14.1 MMOL/L (ref 5–15)
AST SERPL-CCNC: 14 U/L (ref 1–32)
BASOPHILS # BLD AUTO: 0.07 10*3/MM3 (ref 0–0.2)
BASOPHILS NFR BLD AUTO: 0.6 % (ref 0–1.5)
BILIRUB SERPL-MCNC: 0.2 MG/DL (ref 0.2–1.2)
BUN BLD-MCNC: 10 MG/DL (ref 6–20)
BUN/CREAT SERPL: 12.2 (ref 7–25)
CALCIUM SPEC-SCNC: 10 MG/DL (ref 8.6–10.5)
CHLORIDE SERPL-SCNC: 100 MMOL/L (ref 98–107)
CO2 SERPL-SCNC: 23.9 MMOL/L (ref 22–29)
CREAT BLD-MCNC: 0.82 MG/DL (ref 0.57–1)
DEPRECATED RDW RBC AUTO: 46.8 FL (ref 37–54)
EOSINOPHIL # BLD AUTO: 0.59 10*3/MM3 (ref 0–0.4)
EOSINOPHIL NFR BLD AUTO: 4.7 % (ref 0.3–6.2)
ERYTHROCYTE [DISTWIDTH] IN BLOOD BY AUTOMATED COUNT: 14.5 % (ref 12.3–15.4)
GFR SERPL CREATININE-BSD FRML MDRD: 77 ML/MIN/1.73
GLOBULIN UR ELPH-MCNC: 3.9 GM/DL
GLUCOSE BLD-MCNC: 114 MG/DL (ref 65–99)
HCT VFR BLD AUTO: 40.7 % (ref 34–46.6)
HGB BLD-MCNC: 12.9 G/DL (ref 12–15.9)
IMM GRANULOCYTES # BLD AUTO: 0.07 10*3/MM3 (ref 0–0.05)
IMM GRANULOCYTES NFR BLD AUTO: 0.6 % (ref 0–0.5)
LYMPHOCYTES # BLD AUTO: 2.57 10*3/MM3 (ref 0.7–3.1)
LYMPHOCYTES NFR BLD AUTO: 20.4 % (ref 19.6–45.3)
MCH RBC QN AUTO: 27.9 PG (ref 26.6–33)
MCHC RBC AUTO-ENTMCNC: 31.7 G/DL (ref 31.5–35.7)
MCV RBC AUTO: 88.1 FL (ref 79–97)
MONOCYTES # BLD AUTO: 0.61 10*3/MM3 (ref 0.1–0.9)
MONOCYTES NFR BLD AUTO: 4.8 % (ref 5–12)
NEUTROPHILS # BLD AUTO: 8.7 10*3/MM3 (ref 1.7–7)
NEUTROPHILS NFR BLD AUTO: 68.9 % (ref 42.7–76)
NRBC BLD AUTO-RTO: 0 /100 WBC (ref 0–0.2)
PLATELET # BLD AUTO: 344 10*3/MM3 (ref 140–450)
PMV BLD AUTO: 9.9 FL (ref 6–12)
POTASSIUM BLD-SCNC: 3.8 MMOL/L (ref 3.5–5.2)
PROT SERPL-MCNC: 8.3 G/DL (ref 6–8.5)
RBC # BLD AUTO: 4.62 10*6/MM3 (ref 3.77–5.28)
SODIUM BLD-SCNC: 138 MMOL/L (ref 136–145)
WBC NRBC COR # BLD: 12.61 10*3/MM3 (ref 3.4–10.8)

## 2020-03-21 PROCEDURE — 99283 EMERGENCY DEPT VISIT LOW MDM: CPT

## 2020-03-21 PROCEDURE — 80053 COMPREHEN METABOLIC PANEL: CPT | Performed by: PHYSICIAN ASSISTANT

## 2020-03-21 PROCEDURE — 85025 COMPLETE CBC W/AUTO DIFF WBC: CPT | Performed by: PHYSICIAN ASSISTANT

## 2020-03-21 RX ORDER — MECLIZINE HYDROCHLORIDE 25 MG/1
25 TABLET ORAL 3 TIMES DAILY PRN
Qty: 12 TABLET | Refills: 0 | Status: ON HOLD | OUTPATIENT
Start: 2020-03-21 | End: 2021-01-10

## 2020-03-21 RX ORDER — MECLIZINE HYDROCHLORIDE 25 MG/1
25 TABLET ORAL ONCE
Status: COMPLETED | OUTPATIENT
Start: 2020-03-21 | End: 2020-03-21

## 2020-03-21 RX ADMIN — MECLIZINE HYDROCHLORIDE 25 MG: 25 TABLET ORAL at 18:47

## 2020-03-31 ENCOUNTER — APPOINTMENT (OUTPATIENT)
Dept: GENERAL RADIOLOGY | Facility: HOSPITAL | Age: 40
End: 2020-03-31

## 2020-03-31 ENCOUNTER — HOSPITAL ENCOUNTER (EMERGENCY)
Facility: HOSPITAL | Age: 40
Discharge: HOME | End: 2020-03-31
Attending: EMERGENCY MEDICINE | Admitting: EMERGENCY MEDICINE

## 2020-03-31 ENCOUNTER — HOSPITAL ENCOUNTER (EMERGENCY)
Facility: HOSPITAL | Age: 40
Discharge: LEFT BEFORE DISCHARGE | End: 2020-03-31

## 2020-03-31 ENCOUNTER — TELEMEDICINE (OUTPATIENT)
Dept: INTERNAL MEDICINE | Facility: CLINIC | Age: 40
End: 2020-03-31

## 2020-03-31 VITALS
SYSTOLIC BLOOD PRESSURE: 155 MMHG | RESPIRATION RATE: 18 BRPM | HEIGHT: 67 IN | DIASTOLIC BLOOD PRESSURE: 103 MMHG | HEART RATE: 90 BPM | OXYGEN SATURATION: 97 % | WEIGHT: 260 LBS | BODY MASS INDEX: 40.81 KG/M2 | TEMPERATURE: 99.2 F

## 2020-03-31 VITALS
HEIGHT: 66 IN | DIASTOLIC BLOOD PRESSURE: 117 MMHG | RESPIRATION RATE: 22 BRPM | HEART RATE: 103 BPM | BODY MASS INDEX: 41.78 KG/M2 | WEIGHT: 260 LBS | SYSTOLIC BLOOD PRESSURE: 180 MMHG | TEMPERATURE: 98.9 F | OXYGEN SATURATION: 99 %

## 2020-03-31 DIAGNOSIS — M25.512 LEFT SHOULDER PAIN, UNSPECIFIED CHRONICITY: Primary | ICD-10-CM

## 2020-03-31 DIAGNOSIS — R12 HEARTBURN: ICD-10-CM

## 2020-03-31 DIAGNOSIS — M54.9 UPPER BACK PAIN: Primary | ICD-10-CM

## 2020-03-31 PROCEDURE — 73030 X-RAY EXAM OF SHOULDER: CPT

## 2020-03-31 PROCEDURE — 99283 EMERGENCY DEPT VISIT LOW MDM: CPT

## 2020-03-31 PROCEDURE — 99211 OFF/OP EST MAY X REQ PHY/QHP: CPT

## 2020-03-31 PROCEDURE — 99214 OFFICE O/P EST MOD 30 MIN: CPT | Performed by: PHYSICIAN ASSISTANT

## 2020-03-31 RX ORDER — MELOXICAM 7.5 MG/1
7.5 TABLET ORAL DAILY
Status: DISCONTINUED | OUTPATIENT
Start: 2020-03-31 | End: 2020-03-31 | Stop reason: HOSPADM

## 2020-03-31 RX ORDER — METHYLPREDNISOLONE 4 MG/1
TABLET ORAL
Qty: 1 EACH | Refills: 0 | OUTPATIENT
Start: 2020-03-31 | End: 2020-04-07

## 2020-03-31 RX ORDER — METHOCARBAMOL 500 MG/1
1500 TABLET, FILM COATED ORAL ONCE
Status: COMPLETED | OUTPATIENT
Start: 2020-03-31 | End: 2020-03-31

## 2020-03-31 RX ORDER — MELOXICAM 7.5 MG/1
7.5 TABLET ORAL DAILY
Qty: 14 TABLET | Refills: 0 | Status: SHIPPED | OUTPATIENT
Start: 2020-03-31 | End: 2020-07-16

## 2020-03-31 RX ORDER — METHOCARBAMOL 750 MG/1
750 TABLET, FILM COATED ORAL 3 TIMES DAILY PRN
Qty: 21 TABLET | Refills: 0 | Status: SHIPPED | OUTPATIENT
Start: 2020-03-31 | End: 2020-04-02 | Stop reason: ALTCHOICE

## 2020-03-31 RX ADMIN — METHOCARBAMOL TABLETS 1500 MG: 500 TABLET, COATED ORAL at 14:56

## 2020-03-31 RX ADMIN — MELOXICAM 7.5 MG: 7.5 TABLET ORAL at 14:56

## 2020-03-31 NOTE — PROGRESS NOTES
Nicole Altman is a 39 y.o. female.     Subjective   History of Present Illness   Patient with history significant for hypertension, uncomplicated asthma, migraine headaches, GERD, prediabetes, tobacco abuse and bipolar disorder presents today via telehealth visit with continued concern of a couple of months of upper-back pain which begins midline and radiates and out to both shoulders.  These episodes occur 4-5 times per day at random and also are prone to waking her from sleep at night.  Laying on the floor flat on her back helps alleviate the pain within a couple of minutes.  She has also been using a tennis ball to apply additional pressure when laying on her back which seems to help even more. The pain is described as sharp and breath-taking.  She notes pain and heaviness in her arms and around her throat when the episodes occur. No weakness or tingling.  Pain does not seem to be related to eating, drinking or taking medications. No known injury.  The pain is sometimes triggered by reaching a little too far, but not always.     She also notes that acid reflux/heartburn have been much worse in the last few months despite taking Protonix 40 mg daily.  She admits that she drinks around 6 cans of soda per day as well as citric juices. She also smokes and has been unable to quit despite multiple attempts.           The following portions of the patient's history were reviewed and updated as appropriate: allergies, current medications, past family history, past medical history, past social history, past surgical history and problem list.    Review of Systems   Constitutional: Negative for activity change, appetite change, chills, fatigue, fever and unexpected weight change.   HENT: Positive for sore throat (pain radiates to throat). Negative for congestion, drooling, ear discharge, facial swelling, postnasal drip, sinus pain and voice change.    Eyes: Negative for redness and visual disturbance.   Respiratory:  Positive for shortness of breath (with back pain]). Negative for cough, chest tightness, wheezing and stridor.    Cardiovascular: Negative for chest pain, palpitations and leg swelling.   Gastrointestinal: Negative for abdominal distention, anal bleeding, blood in stool, constipation, diarrhea, nausea and vomiting.        Acid reflux     Endocrine: Negative for cold intolerance and heat intolerance.   Genitourinary: Negative for decreased urine volume, difficulty urinating, dysuria, flank pain, genital sores, pelvic pain, vaginal discharge and vaginal pain.   Musculoskeletal: Positive for arthralgias, back pain and myalgias. Negative for gait problem and neck stiffness.   Skin: Negative for color change, pallor and rash.   Allergic/Immunologic: Negative for immunocompromised state.   Neurological: Positive for tremors (medication side effect). Negative for facial asymmetry, speech difficulty, weakness, light-headedness and numbness.   Hematological: Negative for adenopathy. Does not bruise/bleed easily.   Psychiatric/Behavioral: Positive for agitation (atable), dysphoric mood (stable) and sleep disturbance. Negative for confusion, self-injury and suicidal ideas. The patient is nervous/anxious (stable). The patient is not hyperactive.          Objective    Physical Exam   Constitutional: She is oriented to person, place, and time. She appears well-developed and well-nourished. No distress.   Obese.    HENT:   Head: Normocephalic and atraumatic.   Pulmonary/Chest: Effort normal.   Neurological: She is alert and oriented to person, place, and time. Coordination normal.   Skin: She is not diaphoretic. No pallor.   Psychiatric: She has a normal mood and affect. Her behavior is normal. Judgment and thought content normal.   Nursing note and vitals reviewed.        There were no vitals taken for this visit.    Nursing note and vitals reviewed.          Assessment/Plan   Diagnoses and all orders for this visit:    Upper  back pain  -     XR Spine Cervical 2 or 3 View; Future  -     XR spine thoracic 2 vw; Future    Heartburn      Back pain symptoms concerning for nerve impingement. Will first evaluate with x-rays. GI cause of symptoms cannot be excluded. She was strongly encouraged to eliminate soda and acidic juice consumption and to continue Protonix daily. She will give me an update regarding GERD symptoms in 1 week.            PATRICIA Mark  03/31/2020  8:26 AM

## 2020-03-31 NOTE — PATIENT INSTRUCTIONS
Eliminate soda and acidic juice consumption and continue Protonix daily. Give me an update regarding GERD symptoms in 1 week.

## 2020-04-02 DIAGNOSIS — M54.9 UPPER BACK PAIN: Primary | ICD-10-CM

## 2020-04-02 RX ORDER — DICLOFENAC SODIUM 75 MG/1
75 TABLET, DELAYED RELEASE ORAL 2 TIMES DAILY PRN
Qty: 60 TABLET | Refills: 0 | Status: SHIPPED | OUTPATIENT
Start: 2020-04-02 | End: 2020-06-25 | Stop reason: SDUPTHER

## 2020-04-02 RX ORDER — CYCLOBENZAPRINE HCL 10 MG
10 TABLET ORAL 3 TIMES DAILY PRN
Qty: 90 TABLET | Refills: 0 | Status: SHIPPED | OUTPATIENT
Start: 2020-04-02 | End: 2020-05-04

## 2020-04-03 DIAGNOSIS — M54.9 UPPER BACK PAIN: Primary | ICD-10-CM

## 2020-04-03 DIAGNOSIS — M25.512 ACUTE PAIN OF LEFT SHOULDER: ICD-10-CM

## 2020-04-03 DIAGNOSIS — R29.898 WEAKNESS OF LEFT ARM: ICD-10-CM

## 2020-04-08 ENCOUNTER — HOSPITAL ENCOUNTER (OUTPATIENT)
Dept: MRI IMAGING | Facility: HOSPITAL | Age: 40
Discharge: HOME | End: 2020-04-08
Admitting: PHYSICIAN ASSISTANT

## 2020-04-08 DIAGNOSIS — M25.512 ACUTE PAIN OF LEFT SHOULDER: ICD-10-CM

## 2020-04-08 DIAGNOSIS — M54.9 UPPER BACK PAIN: ICD-10-CM

## 2020-04-08 DIAGNOSIS — R29.898 WEAKNESS OF LEFT ARM: ICD-10-CM

## 2020-04-08 PROCEDURE — 72141 MRI NECK SPINE W/O DYE: CPT

## 2020-04-14 DIAGNOSIS — J40 BRONCHITIS: Primary | ICD-10-CM

## 2020-04-14 RX ORDER — DEXTROMETHORPHAN HYDROBROMIDE AND PROMETHAZINE HYDROCHLORIDE 15; 6.25 MG/5ML; MG/5ML
5 SYRUP ORAL 4 TIMES DAILY PRN
Qty: 120 ML | Refills: 0 | Status: SHIPPED | OUTPATIENT
Start: 2020-04-14 | End: 2020-04-20 | Stop reason: SDUPTHER

## 2020-04-14 RX ORDER — METHYLPREDNISOLONE 4 MG/1
TABLET ORAL
Qty: 1 EACH | Refills: 0 | Status: SHIPPED | OUTPATIENT
Start: 2020-04-14 | End: 2020-05-04

## 2020-04-20 DIAGNOSIS — J40 BRONCHITIS: ICD-10-CM

## 2020-04-20 RX ORDER — DEXTROMETHORPHAN HYDROBROMIDE AND PROMETHAZINE HYDROCHLORIDE 15; 6.25 MG/5ML; MG/5ML
5 SYRUP ORAL 4 TIMES DAILY PRN
Qty: 120 ML | Refills: 0 | Status: SHIPPED | OUTPATIENT
Start: 2020-04-20 | End: 2020-04-25

## 2020-05-04 ENCOUNTER — TELEMEDICINE (OUTPATIENT)
Dept: INTERNAL MEDICINE | Facility: CLINIC | Age: 40
End: 2020-05-04

## 2020-05-04 VITALS — SYSTOLIC BLOOD PRESSURE: 106 MMHG | TEMPERATURE: 99 F | DIASTOLIC BLOOD PRESSURE: 73 MMHG | HEART RATE: 87 BPM

## 2020-05-04 DIAGNOSIS — J20.9 ACUTE BRONCHITIS, UNSPECIFIED ORGANISM: Primary | ICD-10-CM

## 2020-05-04 DIAGNOSIS — R09.82 POSTNASAL DRIP: ICD-10-CM

## 2020-05-04 PROCEDURE — 99213 OFFICE O/P EST LOW 20 MIN: CPT | Performed by: PHYSICIAN ASSISTANT

## 2020-05-04 RX ORDER — GUAIFENESIN/DEXTROMETHORPHAN 100-10MG/5
10 SYRUP ORAL 4 TIMES DAILY PRN
Qty: 354 ML | Refills: 0 | Status: SHIPPED | OUTPATIENT
Start: 2020-05-04 | End: 2020-07-16

## 2020-05-04 RX ORDER — DOXYCYCLINE HYCLATE 100 MG/1
100 CAPSULE ORAL 2 TIMES DAILY
Qty: 14 CAPSULE | Refills: 0 | Status: SHIPPED | OUTPATIENT
Start: 2020-05-04 | End: 2020-05-11

## 2020-05-04 RX ORDER — LORATADINE 10 MG/1
10 TABLET ORAL DAILY
Qty: 30 TABLET | Refills: 5 | Status: SHIPPED | OUTPATIENT
Start: 2020-05-04 | End: 2020-12-22

## 2020-05-04 NOTE — PROGRESS NOTES
"You have chosen to receive care through a telehealth visit.  Do you consent to use a video/audio connection for your medical care today? Yes  Active Parties: Nicole Santana (CMA), Amy Connelly (PCP), and (Pt) Nicole Altman.         Nicole Altman is a 39 y.o. female.     Subjective   History of Present Illness   Patient with history significant for asthma and current cigarette smoker presents today via video visit with concern of continued cough.  She was initially seen at [facility] on 4/7/2020 with complaint of pleuritic and exertional left shoulder pain across the upper chest.  She denied fever at home but had temperature of 100.3F in clinic that day.  She endorsed chest tightness and \"smoker's cough\" which was no worse than normal.  She declined COVID-19 and influenza testing that day. 1 week after the urgent care visit she developed worsened cough at which time she was prescribed Promethazine DM cough syrup.  3 days later she reported doing better regarding SOA low-grade fever but was still coughing and losing her voice some.  Cough was worse when lying down.  Most recently, 1 week ago, she reported return of low-grade fever (.9) as well as fatigue, malaise and continued cough.  Nebulizer treatments do not help with SOA with exertion or cough. She reports that the cough feels like it is stuck in her throat. She denies sinus pressure, sneezing, itchy eyes, rhinorrhea or nasal congestions. She feels she may have some postnasal drip. She does have trouble with fall allergies but not typically in the spring. She is not taking antihistamines. She has been taking an OTC cough syrup which is not helping much.         The following portions of the patient's history were reviewed and updated as appropriate: allergies, current medications, past family history, past medical history, past social history, past surgical history and problem list.    Review of Systems   Constitutional: Positive for fatigue " and fever. Negative for activity change, appetite change, chills, diaphoresis and unexpected weight change.   HENT: Positive for postnasal drip. Negative for congestion, drooling, ear pain, mouth sores, rhinorrhea, sinus pressure, sinus pain, sore throat and trouble swallowing.    Eyes: Negative for pain, itching and visual disturbance.   Respiratory: Positive for cough, chest tightness and shortness of breath. Negative for wheezing.    Cardiovascular: Negative for chest pain, palpitations and leg swelling.   Gastrointestinal: Negative for abdominal pain, blood in stool, diarrhea and rectal pain.   Genitourinary: Negative.    Musculoskeletal: Negative for arthralgias, back pain, joint swelling and neck pain.   Skin: Negative for color change and rash.   Allergic/Immunologic: Positive for environmental allergies. Negative for immunocompromised state.   Neurological: Negative for dizziness, tremors, speech difficulty, weakness, light-headedness and headaches.   Hematological: Negative for adenopathy. Does not bruise/bleed easily.   Psychiatric/Behavioral: Positive for sleep disturbance (cough). Negative for agitation, confusion, dysphoric mood, hallucinations, self-injury and suicidal ideas. The patient is not nervous/anxious.          Objective    Physical Exam   Constitutional: She is oriented to person, place, and time. She appears well-developed and well-nourished. No distress.   obese   HENT:   Head: Normocephalic and atraumatic.   Pulmonary/Chest: Effort normal. No respiratory distress.   Coarse cough.    Neurological: She is alert and oriented to person, place, and time. Coordination normal.   Skin: She is not diaphoretic.   Psychiatric: She has a normal mood and affect. Her behavior is normal. Judgment and thought content normal.   Nursing note and vitals reviewed.        /73   Pulse 87   Temp 99 °F (37.2 °C)         Assessment/Plan   Nicole was seen today for abstract.    Diagnoses and all orders for  this visit:    Acute bronchitis, unspecified organism  -     doxycycline (VIBRAMYCIN) 100 MG capsule; Take 1 capsule by mouth 2 (Two) Times a Day for 7 days.  -     guaiFENesin-dextromethorphan (ROBITUSSIN DM) 100-10 MG/5ML syrup; Take 10 mL by mouth 4 (Four) Times a Day As Needed for Cough or Congestion.    Postnasal drip  -     loratadine (Claritin) 10 MG tablet; Take 1 tablet by mouth Daily.    Begin Claritin daily. Will treat with an additional round of doxycycline due to persistent low-grade fever. Continue nebulizer treatments prn.     ER with any acutely worsened symptoms.        PATRICIA Mark  05/04/2020  10:22 AM

## 2020-06-01 DIAGNOSIS — F17.210 CIGARETTE SMOKER: Primary | ICD-10-CM

## 2020-06-01 DIAGNOSIS — R05.3 PERSISTENT COUGH FOR 3 WEEKS OR LONGER: ICD-10-CM

## 2020-06-01 DIAGNOSIS — J45.41 MODERATE PERSISTENT ASTHMA WITH ACUTE EXACERBATION: ICD-10-CM

## 2020-06-01 DIAGNOSIS — J40 BRONCHITIS: ICD-10-CM

## 2020-06-25 DIAGNOSIS — M54.9 UPPER BACK PAIN: ICD-10-CM

## 2020-06-25 RX ORDER — DICLOFENAC SODIUM 75 MG/1
75 TABLET, DELAYED RELEASE ORAL 2 TIMES DAILY PRN
Qty: 60 TABLET | Refills: 0 | Status: SHIPPED | OUTPATIENT
Start: 2020-06-25 | End: 2020-12-03 | Stop reason: SINTOL

## 2020-06-25 RX ORDER — CYCLOBENZAPRINE HCL 10 MG
10 TABLET ORAL 3 TIMES DAILY PRN
Qty: 90 TABLET | Refills: 0 | Status: ON HOLD | OUTPATIENT
Start: 2020-06-25 | End: 2021-01-10

## 2020-06-25 NOTE — PLAN OF CARE
----- Message from Marilou Nuno sent at 6/25/2020 12:29 PM CDT -----  Regarding: reschedule 07/15 or 07/22  Contact: patient  Please call if appointment is available in the morning on those dates @ [phone]. thanks     Problem: Patient Care Overview  Goal: Plan of Care Review  Outcome: Ongoing (interventions implemented as appropriate)   06/02/18 3384   Coping/Psychosocial   Patient Agreement with Plan of Care agrees   Plan of Care Review   Progress no change   Coping/Psychosocial   Plan of Care Reviewed With patient

## 2020-06-29 RX ORDER — NICOTINE 21 MG/24HR
1 PATCH, TRANSDERMAL 24 HOURS TRANSDERMAL EVERY 24 HOURS
Qty: 28 EACH | Refills: 2 | Status: SHIPPED | OUTPATIENT
Start: 2020-06-29 | End: 2020-12-22

## 2020-07-06 ENCOUNTER — PRIOR AUTHORIZATION (OUTPATIENT)
Dept: INTERNAL MEDICINE | Facility: CLINIC | Age: 40
End: 2020-07-06

## 2020-07-06 NOTE — TELEPHONE ENCOUNTER
Nicotine patch needed PA    Denied today  PLEASE READ. Medicare has excluded over-the-counter (OTC) drug products from Part D coverage. For more information, see your Evidence of Coverage booklet under- What types of drugs are not covered by the plan.    Pharmacy informed

## 2020-07-15 ENCOUNTER — TELEPHONE (OUTPATIENT)
Dept: INTERNAL MEDICINE | Facility: CLINIC | Age: 40
End: 2020-07-15

## 2020-07-15 NOTE — TELEPHONE ENCOUNTER
Caller: Nicole Altman    Relationship to patient: Self    Best call back number: [phone]    Concerns or Questions if Applicable:PT MADE APPT FOR 07/16/20 BUT ANSWERED YES TO COUGH IN TRAVEL SCREEN PT STATES SHE HAS THE COUGH DUE TO BEING ASTHMATIC  AND A SMOKER DO YOU WANT TO KEEP IN OFFICE APPT WITH THIS PT PLEASE ADVISE    Travel screen questions:

## 2020-07-15 NOTE — TELEPHONE ENCOUNTER
Patient returned clinic's call and I asked all of the screening questions regarding COVID symptoms and patient answered no to all. Patient states that she has had cough for a couple of months but is a current smoker and has diagnosis of asthma.   I advised patient that she was able to keep her scheduled appointment since symptoms have been over 2 weeks and are related to another diagnosis.

## 2020-07-16 ENCOUNTER — TELEPHONE (OUTPATIENT)
Dept: INTERNAL MEDICINE | Facility: CLINIC | Age: 40
End: 2020-07-16

## 2020-07-16 ENCOUNTER — APPOINTMENT (OUTPATIENT)
Dept: ULTRASOUND IMAGING | Facility: HOSPITAL | Age: 40
End: 2020-07-16

## 2020-07-16 ENCOUNTER — OFFICE VISIT (OUTPATIENT)
Dept: INTERNAL MEDICINE | Facility: CLINIC | Age: 40
End: 2020-07-16

## 2020-07-16 VITALS
WEIGHT: 249 LBS | HEIGHT: 66 IN | HEART RATE: 77 BPM | TEMPERATURE: 97.8 F | BODY MASS INDEX: 40.02 KG/M2 | DIASTOLIC BLOOD PRESSURE: 80 MMHG | SYSTOLIC BLOOD PRESSURE: 110 MMHG | OXYGEN SATURATION: 100 %

## 2020-07-16 DIAGNOSIS — R00.2 PALPITATIONS: ICD-10-CM

## 2020-07-16 DIAGNOSIS — M79.605 LEFT LEG PAIN: Primary | ICD-10-CM

## 2020-07-16 DIAGNOSIS — F31.60 BIPOLAR DISORDER, MIXED (HCC): ICD-10-CM

## 2020-07-16 DIAGNOSIS — Z79.890 HORMONE REPLACEMENT THERAPY: ICD-10-CM

## 2020-07-16 DIAGNOSIS — F17.210 CIGARETTE SMOKER: ICD-10-CM

## 2020-07-16 DIAGNOSIS — I10 ESSENTIAL HYPERTENSION: ICD-10-CM

## 2020-07-16 LAB
ALBUMIN SERPL-MCNC: 4.2 G/DL (ref 3.5–5.2)
ALBUMIN/GLOB SERPL: 1.9 G/DL
ALP SERPL-CCNC: 76 U/L (ref 39–117)
ALT SERPL-CCNC: 6 U/L (ref 1–33)
AST SERPL-CCNC: 7 U/L (ref 1–32)
BASOPHILS # BLD AUTO: 0.07 10*3/MM3 (ref 0–0.2)
BASOPHILS NFR BLD AUTO: 0.7 % (ref 0–1.5)
BILIRUB SERPL-MCNC: 0.4 MG/DL (ref 0–1.2)
BUN SERPL-MCNC: 10 MG/DL (ref 6–20)
BUN/CREAT SERPL: 9.3 (ref 7–25)
CALCIUM SERPL-MCNC: 9.6 MG/DL (ref 8.6–10.5)
CHLORIDE SERPL-SCNC: 106 MMOL/L (ref 98–107)
CO2 SERPL-SCNC: 21.4 MMOL/L (ref 22–29)
CREAT SERPL-MCNC: 1.07 MG/DL (ref 0.57–1)
D DIMER PPP FEU-MCNC: 0.62 MCGFEU/ML (ref 0–0.57)
EOSINOPHIL # BLD AUTO: 0.77 10*3/MM3 (ref 0–0.4)
EOSINOPHIL NFR BLD AUTO: 7.4 % (ref 0.3–6.2)
ERYTHROCYTE [DISTWIDTH] IN BLOOD BY AUTOMATED COUNT: 14.6 % (ref 12.3–15.4)
GLOBULIN SER CALC-MCNC: 2.2 GM/DL
GLUCOSE SERPL-MCNC: 104 MG/DL (ref 65–99)
HCT VFR BLD AUTO: 34.1 % (ref 34–46.6)
HGB BLD-MCNC: 11 G/DL (ref 12–15.9)
IMM GRANULOCYTES # BLD AUTO: 0.03 10*3/MM3 (ref 0–0.05)
IMM GRANULOCYTES NFR BLD AUTO: 0.3 % (ref 0–0.5)
LYMPHOCYTES # BLD AUTO: 1.94 10*3/MM3 (ref 0.7–3.1)
LYMPHOCYTES NFR BLD AUTO: 18.7 % (ref 19.6–45.3)
MCH RBC QN AUTO: 25.9 PG (ref 26.6–33)
MCHC RBC AUTO-ENTMCNC: 32.3 G/DL (ref 31.5–35.7)
MCV RBC AUTO: 80.2 FL (ref 79–97)
MONOCYTES # BLD AUTO: 0.58 10*3/MM3 (ref 0.1–0.9)
MONOCYTES NFR BLD AUTO: 5.6 % (ref 5–12)
NEUTROPHILS # BLD AUTO: 6.98 10*3/MM3 (ref 1.7–7)
NEUTROPHILS NFR BLD AUTO: 67.3 % (ref 42.7–76)
NRBC BLD AUTO-RTO: 0 /100 WBC (ref 0–0.2)
PLATELET # BLD AUTO: 316 10*3/MM3 (ref 140–450)
POTASSIUM SERPL-SCNC: 4.5 MMOL/L (ref 3.5–5.2)
PROT SERPL-MCNC: 6.4 G/DL (ref 6–8.5)
RBC # BLD AUTO: 4.25 10*6/MM3 (ref 3.77–5.28)
SODIUM SERPL-SCNC: 136 MMOL/L (ref 136–145)
TSH SERPL DL<=0.005 MIU/L-ACNC: 2.09 UIU/ML (ref 0.27–4.2)
WBC # BLD AUTO: 10.37 10*3/MM3 (ref 3.4–10.8)

## 2020-07-16 PROCEDURE — 99214 OFFICE O/P EST MOD 30 MIN: CPT | Performed by: PHYSICIAN ASSISTANT

## 2020-07-16 RX ORDER — ALBUTEROL SULFATE 2.5 MG/3ML
2.5 SOLUTION RESPIRATORY (INHALATION) EVERY 4 HOURS PRN
Qty: 30 VIAL | Refills: 12 | Status: ON HOLD | OUTPATIENT
Start: 2020-07-16 | End: 2021-01-10

## 2020-07-16 RX ORDER — CLONIDINE HYDROCHLORIDE 0.1 MG/1
TABLET ORAL
Qty: 2 TABLET | Refills: 0 | Status: SHIPPED | OUTPATIENT
Start: 2020-07-16 | End: 2020-12-14

## 2020-07-16 NOTE — PROGRESS NOTES
Nicole Altman is a 39 y.o. female.     Subjective   History of Present Illness   Here today with concern of aching in the left flank for the last week or so.  She describes intermittent ache proximal to the left ankle laterally as well as in the medial distal thigh.  Massage seems to make the aching feel better and it is not very bothersome today.  Symptoms occur intermittently throughout the day but are more bothersome at night and have contributed to sleep disturbances.  She denies any edema, erythema or warmth.  She does have varicose veins which are no worse than baseline.  She does have chronic low back pain which does not seem any worse than normal.  No urinary symptoms.  She denies any new injury or increased physical activity which could have contributed to symptoms.  No chest pain and no new SOA.  She has noticed some palpitations while trying to fall asleep for the last week or so which seemed to continue intermittently throughout the night.  She also notes increased tremors and shakiness for the last couple of weeks but denies any medication changes which would contribute.  She continues to smoke and use estradiol hormone replacement therapy despite her frequent conversations regarding increased risk for DVT/PE and stroke.    She does plan to quit smoking on 7/23 and is awaiting patches and lozenges from [facility]now.       The following portions of the patient's history were reviewed and updated as appropriate: allergies, current medications, past family history, past medical history, past social history, past surgical history and problem list.    Review of Systems   Constitutional: Negative for activity change, chills, fatigue and fever.   HENT: Negative.    Eyes: Negative.  Negative for visual disturbance.   Respiratory: Positive for cough (chronic, unchanged) and shortness of breath (chronic, unchanged). Negative for apnea, chest tightness and wheezing.    Cardiovascular: Positive for  palpitations. Negative for chest pain and leg swelling.   Gastrointestinal: Negative for abdominal pain, constipation, diarrhea, nausea and vomiting.   Endocrine: Negative for cold intolerance, heat intolerance, polydipsia, polyphagia and polyuria.   Genitourinary: Negative.    Musculoskeletal: Positive for arthralgias, back pain and myalgias. Negative for gait problem, joint swelling, neck pain and neck stiffness.   Skin: Negative.    Allergic/Immunologic: Negative for immunocompromised state.   Neurological: Negative for dizziness, speech difficulty, weakness, headache, memory problem and confusion.   Hematological: Negative for adenopathy. Does not bruise/bleed easily.   Psychiatric/Behavioral: Positive for agitation and sleep disturbance. Negative for decreased concentration, hallucinations, suicidal ideas and depressed mood. The patient is nervous/anxious.          Objective    Physical Exam   Constitutional: She is oriented to person, place, and time. She appears well-developed and well-nourished. No distress.   Obese.    HENT:   Head: Normocephalic and atraumatic.   Eyes: Pupils are equal, round, and reactive to light. Conjunctivae and EOM are normal.   Neck: Normal range of motion. Neck supple.   Cardiovascular: Normal rate, regular rhythm and normal heart sounds. Exam reveals no gallop and no friction rub.   No murmur heard.  Varicosities of bilateral legs without bulging but with tenderness.    Pulmonary/Chest: Effort normal and breath sounds normal. No respiratory distress. She has no wheezes. She has no rales.   Abdominal: Soft. Bowel sounds are normal. She exhibits no mass. There is no tenderness. No hernia.   Musculoskeletal: Normal range of motion. She exhibits edema. She exhibits no deformity.        Left lower leg: She exhibits tenderness ( Positive mylene's sign left calf. ) and swelling (trace lower leg swelling compared to right). She exhibits no deformity and no laceration.   Positive mylene's  "sign left calf.    Lymphadenopathy:     She has no cervical adenopathy.   Neurological: She is alert and oriented to person, place, and time. She is not disoriented. She displays tremor (extrapyramidal symptom). She displays normal reflexes. No cranial nerve deficit or sensory deficit. She exhibits normal muscle tone. Coordination normal.   Skin: Skin is warm and dry. Capillary refill takes less than 2 seconds. No rash noted. She is not diaphoretic.   Psychiatric: Her speech is normal and behavior is normal. Judgment and thought content normal. Her mood appears anxious. Her affect is not angry and not inappropriate. She is not actively hallucinating. Cognition and memory are normal. She is attentive.   Nursing note and vitals reviewed.        /80   Pulse 77   Temp 97.8 °F (36.6 °C)   Ht 167.6 cm (65.98\")   Wt 113 kg (249 lb)   SpO2 100%   BMI 40.21 kg/m²     Nursing note and vitals reviewed.          Assessment/Plan   Nicole was seen today for leg pain.    Diagnoses and all orders for this visit:    Left leg pain  -     D-dimer, Quantitative  -     US venous doppler lower extremity left (duplex)    Cigarette smoker  -     D-dimer, Quantitative  -     US venous doppler lower extremity left (duplex)    Bipolar disorder, mixed (CMS/HCC)  -     CBC & Differential  -     Comprehensive Metabolic Panel  -     TSH Rfx On Abnormal To Free T4  -     D-dimer, Quantitative    Hormone replacement therapy  -     D-dimer, Quantitative  -     US venous doppler lower extremity left (duplex)    Palpitations  -     TSH Rfx On Abnormal To Free T4  -     D-dimer, Quantitative  -     US venous doppler lower extremity left (duplex)    Essential hypertension  -     cloNIDine (Catapres) 0.1 MG tablet; Take 1 tablet by mouth every 12 hours as needed for blood pressure over 160/90.    Other orders  -     albuterol (PROVENTIL) (2.5 MG/3ML) 0.083% nebulizer solution; Take 2.5 mg by nebulization Every 4 (Four) Hours As Needed for " Wheezing. Indications: t      D-dimer critical result of 0.62 received at 3:20 pm at which time patient was notified and doppler ultrasound was ordered.     ER with any acutely worsened symptoms or development of any chest pain or acute SOA.  As always, smoking cessation was strongly advised.           PATRICIA Mark  07/16/2020  9:46 AM

## 2020-07-16 NOTE — TELEPHONE ENCOUNTER
Lab called about critical lab results on patient. D-Dimer was 0.6, Amy Connelly PA-C was informed.

## 2020-07-17 ENCOUNTER — HOSPITAL ENCOUNTER (OUTPATIENT)
Dept: ULTRASOUND IMAGING | Facility: HOSPITAL | Age: 40
Discharge: HOME | End: 2020-07-17
Admitting: PHYSICIAN ASSISTANT

## 2020-07-17 DIAGNOSIS — D64.9 NORMOCYTIC ANEMIA: Primary | ICD-10-CM

## 2020-07-17 PROCEDURE — 93971 EXTREMITY STUDY: CPT

## 2020-07-17 NOTE — PROGRESS NOTES
Mild anemia. Will further evaluate with B12, ferritin, iron profile and peripheral smear. D-dimer elevated, ultrasound left lower extremity was normal.

## 2020-07-24 DIAGNOSIS — E53.8 B12 DEFICIENCY: Primary | ICD-10-CM

## 2020-07-24 LAB
BASOPHILS # BLD AUTO: 0.1 X10E3/UL (ref 0–0.2)
BASOPHILS NFR BLD AUTO: 1 %
EOSINOPHIL # BLD AUTO: 0.8 X10E3/UL (ref 0–0.4)
EOSINOPHIL NFR BLD AUTO: 7 %
ERYTHROCYTE [DISTWIDTH] IN BLOOD BY AUTOMATED COUNT: 15.6 % (ref 11.7–15.4)
FERRITIN SERPL-MCNC: 47.1 NG/ML (ref 13–150)
HCT VFR BLD AUTO: 36.2 % (ref 34–46.6)
HGB BLD-MCNC: 11.2 G/DL (ref 11.1–15.9)
IMM GRANULOCYTES # BLD AUTO: 0 X10E3/UL (ref 0–0.1)
IMM GRANULOCYTES NFR BLD AUTO: 0 %
IRON SATN MFR SERPL: 10 % (ref 20–50)
IRON SERPL-MCNC: 40 MCG/DL (ref 37–145)
LYMPHOCYTES # BLD AUTO: 2.5 X10E3/UL (ref 0.7–3.1)
LYMPHOCYTES NFR BLD AUTO: 22 %
MCH RBC QN AUTO: 25.6 PG (ref 26.6–33)
MCHC RBC AUTO-ENTMCNC: 30.9 G/DL (ref 31.5–35.7)
MCV RBC AUTO: 83 FL (ref 79–97)
MONOCYTES # BLD AUTO: 0.6 X10E3/UL (ref 0.1–0.9)
MONOCYTES NFR BLD AUTO: 5 %
NEUTROPHILS # BLD AUTO: 7.3 X10E3/UL (ref 1.4–7)
NEUTROPHILS NFR BLD AUTO: 65 %
PATH INTERP BLD-IMP: ABNORMAL
PATH REV BLD -IMP: ABNORMAL
PATHOLOGIST NAME: ABNORMAL
PLATELET # BLD AUTO: 352 X10E3/UL (ref 150–450)
RBC # BLD AUTO: 4.38 X10E6/UL (ref 3.77–5.28)
TIBC SERPL-MCNC: 396 MCG/DL
UIBC SERPL-MCNC: 356 MCG/DL (ref 112–346)
VIT B12 SERPL-MCNC: 244 PG/ML (ref 211–946)
WBC # BLD AUTO: 11.3 X10E3/UL (ref 3.4–10.8)

## 2020-07-24 RX ORDER — CYANOCOBALAMIN 1000 UG/ML
1000 INJECTION, SOLUTION INTRAMUSCULAR; SUBCUTANEOUS
Status: DISCONTINUED | OUTPATIENT
Start: 2020-07-27 | End: 2021-01-10

## 2020-08-05 ENCOUNTER — CLINICAL SUPPORT (OUTPATIENT)
Dept: INTERNAL MEDICINE | Facility: CLINIC | Age: 40
End: 2020-08-05

## 2020-08-05 DIAGNOSIS — E53.8 B12 DEFICIENCY: ICD-10-CM

## 2020-08-05 PROCEDURE — 96372 THER/PROPH/DIAG INJ SC/IM: CPT | Performed by: PHYSICIAN ASSISTANT

## 2020-08-05 RX ADMIN — CYANOCOBALAMIN 1000 MCG: 1000 INJECTION, SOLUTION INTRAMUSCULAR; SUBCUTANEOUS at 11:41

## 2020-08-13 DIAGNOSIS — F41.0 ANXIETY ATTACK: Primary | ICD-10-CM

## 2020-08-13 RX ORDER — LORAZEPAM 0.5 MG/1
TABLET ORAL
Qty: 3 TABLET | Refills: 0 | Status: SHIPPED | OUTPATIENT
Start: 2020-08-13 | End: 2020-12-22

## 2020-08-13 NOTE — TELEPHONE ENCOUNTER
Patient has significant anxiety which triggers uncontrolled hypertension disqualifying her from dental procedures. Rob reviewed and compliant.

## 2020-09-01 ENCOUNTER — TELEMEDICINE (OUTPATIENT)
Dept: INTERNAL MEDICINE | Facility: CLINIC | Age: 40
End: 2020-09-01

## 2020-09-01 VITALS
BODY MASS INDEX: 40.18 KG/M2 | DIASTOLIC BLOOD PRESSURE: 78 MMHG | SYSTOLIC BLOOD PRESSURE: 118 MMHG | HEART RATE: 71 BPM | WEIGHT: 250 LBS | HEIGHT: 66 IN

## 2020-09-01 DIAGNOSIS — T50.905A EXTRAPYRAMIDAL MOVEMENT DISORDER, DRUG-INDUCED: Primary | ICD-10-CM

## 2020-09-01 DIAGNOSIS — F10.21 HISTORY OF ALCOHOL DEPENDENCE (HCC): ICD-10-CM

## 2020-09-01 DIAGNOSIS — G25.89 EXTRAPYRAMIDAL MOVEMENT DISORDER, DRUG-INDUCED: Primary | ICD-10-CM

## 2020-09-01 DIAGNOSIS — F31.60 BIPOLAR DISORDER, MIXED (HCC): ICD-10-CM

## 2020-09-01 PROCEDURE — 99213 OFFICE O/P EST LOW 20 MIN: CPT | Performed by: PHYSICIAN ASSISTANT

## 2020-09-01 RX ORDER — BENZTROPINE MESYLATE 2 MG/1
2 TABLET ORAL 2 TIMES DAILY
Qty: 60 TABLET | Refills: 5 | Status: SHIPPED | OUTPATIENT
Start: 2020-09-01 | End: 2020-12-22

## 2020-09-01 NOTE — PROGRESS NOTES
You have chosen to receive care through a telehealth visit.  Do you consent to use a video/audio connection for your medical care today? Yes  Active parties: Amy Connelly PA-C, Mervat Akbar CMA,   Nicole Altman          Nicoledelmar Altman is a 40 y.o. female.     Subjective   History of Present Illness   Patient presents today via video visit with concern of worsened depression and suicidal ideation. She is known to have bipolar disorder, mixed, which is currently treated with Lamictal, Lithium and Vraylar.  She is also known to have premenstrual dysphoric disorder which is managed with estradiol 0.5 mg daily. In the last couple of months she has had markedly increased dysphoric mood and suicidal ideation for approximately 2 weeks per month prior to onset of menses. She has had similar symptoms in the past which estrogen therapy seemed to help. Vraylar was also added when estrogen therapy did not seem like enough, and she did see benefit from Vraylar for a long while until recently. We have previously tried an increased dose of Vraylar which she was unable to tolerate due to causing increased restlessness and movement disorder. Cogentin 1 mg twice daily has managed those symptoms well at the 1.5 mg daily dose but did not control symptoms well at the 3 mg dose when attempted previously. An outside provider tried changing Cogentin to Ingrezza which was ineffective as she developed extrapyramidal symptoms within the first few hour of taking the first dose. She has been on lithium and lamictal for decades and reports that she is unable to tolerate doses any higher than currently prescribed.       In the last couple of months she has had increased stress due to the eviction of some of her close neighbors who are part of her support system.         The following portions of the patient's history were reviewed and updated as appropriate: allergies, current medications, past family history, past medical history, past social  history, past surgical history and problem list.    Review of Systems   Constitutional: Negative for activity change, chills, fatigue and fever.   HENT: Negative.    Eyes: Negative.  Negative for visual disturbance.   Respiratory: Negative for cough, chest tightness, shortness of breath and wheezing.    Cardiovascular: Negative for chest pain, palpitations and leg swelling.   Gastrointestinal: Negative for abdominal pain, constipation, diarrhea, nausea and vomiting.   Endocrine: Negative for polydipsia, polyphagia and polyuria.   Genitourinary: Positive for menstrual problem (PMDD). Negative for difficulty urinating, flank pain, genital sores, urgency and vaginal bleeding.   Musculoskeletal: Negative.    Skin: Negative.    Allergic/Immunologic: Negative for immunocompromised state.   Neurological: Negative for dizziness, speech difficulty, weakness, headache and confusion.        Movement disorder, drug induced.   Hematological: Negative for adenopathy. Does not bruise/bleed easily.   Psychiatric/Behavioral: Positive for agitation, dysphoric mood, suicidal ideas ( worse last 2-3 weeks, better in last 2 days) and depressed mood ( worse last 2-3 weeks, better in last 2 days). Negative for sleep disturbance. The patient is nervous/anxious (worse last 2-3 weeks, better in last 2 days).          Objective    Physical Exam   Constitutional: She is oriented to person, place, and time. She appears well-developed and well-nourished. No distress.   HENT:   Head: Normocephalic and atraumatic.   Pulmonary/Chest: Effort normal. No respiratory distress.   Neurological: She is alert and oriented to person, place, and time. Coordination normal.   Skin: She is not diaphoretic.   Psychiatric: Her speech is normal and behavior is normal. Judgment normal. Her mood appears anxious (baseline). She is not actively hallucinating. Thought content is not paranoid and not delusional. Cognition and memory are normal. She exhibits a depressed  "mood (baseline, improved compared to last week per patient report). She expresses suicidal ideation. She expresses no homicidal ideation. She expresses no suicidal plans and no homicidal plans. She is attentive.   Nursing note reviewed.        PHQ-9 Depression Screening  Little interest or pleasure in doing things? 1(AROUND MENSES, SHE HAS REGULAR MENSES)   Feeling down, depressed, or hopeless? 1(DURING MENSES)   Trouble falling or staying asleep, or sleeping too much? 0   Feeling tired or having little energy? 1(DURING MENSES)   Poor appetite or overeating? 3   Feeling bad about yourself - or that you are a failure or have let yourself or your family down? 3   Trouble concentrating on things, such as reading the newspaper or watching television? 0(DURING MENSES)   Moving or speaking so slowly that other people could have noticed? Or the opposite - being so fidgety or restless that you have been moving around a lot more than usual? 3   Thoughts that you would be better off dead, or of hurting yourself in some way? 1   PHQ-9 Total Score 13   If you checked off any problems, how difficult have these problems made it for you to do your work, take care of things at home, or get along with other people? Not difficult at all         /78   Pulse 71   Ht 167.6 cm (65.98\")   Wt 113 kg (250 lb)   BMI 40.37 kg/m²     Nursing note and vitals reviewed.          Assessment/Plan   Nicole was seen today for depression.    Diagnoses and all orders for this visit:    Extrapyramidal movement disorder, drug-induced  -     benztropine (COGENTIN) 2 MG tablet; Take 1 tablet by mouth 2 (Two) Times a Day.    Bipolar disorder, mixed (CMS/HCC)  -     Dose increased due to history of acutely worsened extrapyramidal symptoms with increase in Vraylar dosing: benztropine (COGENTIN) 2 MG tablet; Take 1 tablet by mouth 2 (Two) Times a Day.  -     Dose increase: Cariprazine HCl (VRAYLAR) 3 MG capsule capsule; Take 1 capsule by mouth " Daily.    Patient screened positive for depression based on a PHQ-9 score of 13 on 9/1/2020. Follow-up recommendations include: Prescribed antidepressant medication treatment.    History of alcohol dependence (CMS/HCC)  In recovery for many years with continued meetings and support.      Check in by phone or [facility] [employer] [facility] message next week with update on symptoms and side effects.  Medicare wellness visit in 3 months.  Call or RTC sooner with any concerns.    In the future we may consider changing Lamictal to Trileptal due to better Genes[employer] category.               PATRICIA Mark  09/01/2020  11:31 AM

## 2020-09-06 ENCOUNTER — DOCUMENTATION (OUTPATIENT)
Dept: INTERNAL MEDICINE | Facility: CLINIC | Age: 40
End: 2020-09-06

## 2020-09-06 RX ORDER — LAMOTRIGINE 150 MG/1
150 TABLET ORAL 2 TIMES DAILY
Qty: 60 TABLET | Refills: 5 | Status: SHIPPED | OUTPATIENT
Start: 2020-09-06 | End: 2021-03-08

## 2020-09-10 DIAGNOSIS — R12 HEARTBURN: ICD-10-CM

## 2020-09-11 RX ORDER — PANTOPRAZOLE SODIUM 40 MG/1
TABLET, DELAYED RELEASE ORAL
Qty: 30 TABLET | Refills: 0 | Status: SHIPPED | OUTPATIENT
Start: 2020-09-11 | End: 2020-10-13

## 2020-09-24 RX ORDER — ONDANSETRON 8 MG/1
TABLET, ORALLY DISINTEGRATING ORAL
Qty: 12 TABLET | Refills: 5 | Status: SHIPPED | OUTPATIENT
Start: 2020-09-24 | End: 2021-04-13

## 2020-10-06 ENCOUNTER — DOCUMENTATION (OUTPATIENT)
Dept: INTERNAL MEDICINE | Facility: CLINIC | Age: 40
End: 2020-10-06

## 2020-10-06 RX ORDER — ASENAPINE MALEATE 5 MG/1
5 TABLET SUBLINGUAL 2 TIMES DAILY
Qty: 60 TABLET | Refills: 3 | Status: SHIPPED | OUTPATIENT
Start: 2020-10-06 | End: 2020-11-05

## 2020-10-08 ENCOUNTER — PRIOR AUTHORIZATION (OUTPATIENT)
Dept: INTERNAL MEDICINE | Facility: CLINIC | Age: 40
End: 2020-10-08

## 2020-10-08 PROBLEM — F41.9 ANXIETY: Status: ACTIVE | Noted: 2020-10-08

## 2020-10-12 DIAGNOSIS — R12 HEARTBURN: ICD-10-CM

## 2020-10-13 RX ORDER — PANTOPRAZOLE SODIUM 40 MG/1
TABLET, DELAYED RELEASE ORAL
Qty: 30 TABLET | Refills: 11 | Status: ON HOLD | OUTPATIENT
Start: 2020-10-13 | End: 2021-01-10

## 2020-10-30 ENCOUNTER — TELEMEDICINE (OUTPATIENT)
Dept: INTERNAL MEDICINE | Facility: CLINIC | Age: 40
End: 2020-10-30

## 2020-10-30 ENCOUNTER — OFFICE VISIT (OUTPATIENT)
Dept: OBSTETRICS AND GYNECOLOGY | Facility: CLINIC | Age: 40
End: 2020-10-30

## 2020-10-30 DIAGNOSIS — F31.60 BIPOLAR DISORDER, MIXED (HCC): ICD-10-CM

## 2020-10-30 DIAGNOSIS — F32.81 PMDD (PREMENSTRUAL DYSPHORIC DISORDER): Primary | ICD-10-CM

## 2020-10-30 DIAGNOSIS — F32.81 PREMENSTRUAL DYSPHORIC DISORDER: Primary | ICD-10-CM

## 2020-10-30 DIAGNOSIS — F33.9 EPISODE OF RECURRENT MAJOR DEPRESSIVE DISORDER, UNSPECIFIED DEPRESSION EPISODE SEVERITY (HCC): ICD-10-CM

## 2020-10-30 PROCEDURE — 99213 OFFICE O/P EST LOW 20 MIN: CPT | Performed by: PHYSICIAN ASSISTANT

## 2020-10-30 PROCEDURE — 99443 PR PHYS/QHP TELEPHONE EVALUATION 21-30 MIN: CPT | Performed by: OBSTETRICS & GYNECOLOGY

## 2020-10-30 RX ORDER — FLUOXETINE 10 MG/1
CAPSULE ORAL
Qty: 7 CAPSULE | Refills: 5 | Status: SHIPPED | OUTPATIENT
Start: 2020-10-30 | End: 2020-12-03 | Stop reason: SINTOL

## 2020-10-30 RX ORDER — CARIPRAZINE 3 MG/1
CAPSULE, GELATIN COATED ORAL
COMMUNITY
Start: 2020-10-26 | End: 2020-12-14 | Stop reason: ALTCHOICE

## 2020-10-30 NOTE — PROGRESS NOTES
You have chosen to receive care through a telehealth visit.  Do you consent to use a video/audio connection for your medical care today? Yes  Active parties: Amy Connelly PA-C, Mervat Akbar CMA, Nicole Altman        Nicoledelmar Altman is a 40 y.o. female.     Subjective   History of Present Illness   Patient presents today via video visit with concern of premenstrual dysphoric disorder.  She had a telephone visit with her gynecologist earlier today where it was decided that daily estrogen use would be discontinued as it has not seemed to significantly improve PMDD symptoms.  Labs will be completed around 10 days from now at which time new treatment option will be considered, however hormone replacement will not likely be reconsidered per patient's report.  She reports continued monthly episodes of severe depression and suicidal ideation prior to onset of menses with abrupt improvement in symptoms as soon as menses begins.  There is been no change in PMDD symptoms since changing from Vraylar to Saphris 3 weeks ago, however she notes that depression improved significantly with the change outside of the 1 week leading up to menses.  She is also tolerating Saphris much better than the previously prescribed Vraylar as tremors and restlessness improved significantly enough that she was able to discontinue Cogentin entirely.  LMP began 10/26/2020.  Today she denies any suicidal or homicidal ideation and reports that her mood is very stable without bothersome depression.       The following portions of the patient's history were reviewed and updated as appropriate: allergies, current medications, past family history, past medical history, past social history, past surgical history and problem list.    Review of Systems   Constitutional: Negative for activity change, chills, fatigue and fever.   HENT: Negative.    Eyes: Negative.  Negative for visual disturbance.   Respiratory: Negative for cough, chest tightness, shortness  of breath and wheezing.    Cardiovascular: Negative for chest pain, palpitations and leg swelling.   Gastrointestinal: Negative for abdominal pain, constipation, diarrhea, nausea and vomiting.   Endocrine: Negative for polydipsia, polyphagia and polyuria.   Genitourinary: Negative.    Musculoskeletal: Negative.    Skin: Negative.    Allergic/Immunologic: Negative for immunocompromised state.   Neurological: Positive for tremors (much improved). Negative for dizziness, speech difficulty, weakness, headache and confusion.   Hematological: Negative for adenopathy. Does not bruise/bleed easily.   Psychiatric/Behavioral: Positive for agitation (much improved), suicidal ideas (1 week per month, PMDD) and depressed mood ( much improved). Negative for behavioral problems, decreased concentration, hallucinations, sleep disturbance, negative for hyperactivity and stress. The patient is nervous/anxious (much improved).          Objective    Physical Exam  Nursing note reviewed.   Constitutional:       General: She is not in acute distress.     Appearance: Normal appearance. She is well-developed. She is obese. She is not ill-appearing, toxic-appearing or diaphoretic.   Pulmonary:      Effort: Pulmonary effort is normal. No respiratory distress.   Neurological:      Mental Status: She is alert and oriented to person, place, and time.      Coordination: Coordination normal.   Psychiatric:         Mood and Affect: Mood normal.         Behavior: Behavior normal.         Thought Content: Thought content normal.         Judgment: Judgment normal.           There were no vitals taken for this visit.        Assessment/Plan   Diagnoses and all orders for this visit:    1. PMDD (premenstrual dysphoric disorder) (Primary)  -     FLUoxetine (PROzac) 10 MG capsule; Take 1 capsule daily for 7 days out of each month beginning 1 week prior on onset of menstruation.  Dispense: 7 capsule; Refill: 5  First dose to be taken on or around  11/15/2020.  She will notify me of symptoms around time menses begins.     2. Bipolar disorder, mixed (CMS/[facility])  Much improved since changing from Vraylar to Saphris, continue 5 mg twice daily.      ER at anytime there is acutely worsening suicidal ideation or with any suicidal intent or plan.         PATRICIA Mark  10/30/2020  14:33 EDT
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
## 2020-11-09 DIAGNOSIS — D64.9 NORMOCYTIC ANEMIA: Primary | ICD-10-CM

## 2020-11-09 DIAGNOSIS — G25.9 MOVEMENT DISORDER: ICD-10-CM

## 2020-12-02 ENCOUNTER — E-VISIT (OUTPATIENT)
Dept: INTERNAL MEDICINE | Facility: CLINIC | Age: 40
End: 2020-12-02

## 2020-12-02 DIAGNOSIS — F31.60 BIPOLAR DISORDER, MIXED (HCC): ICD-10-CM

## 2020-12-03 ENCOUNTER — TELEMEDICINE (OUTPATIENT)
Dept: INTERNAL MEDICINE | Facility: CLINIC | Age: 40
End: 2020-12-03

## 2020-12-03 DIAGNOSIS — D64.9 NORMOCYTIC ANEMIA: ICD-10-CM

## 2020-12-03 DIAGNOSIS — F32.81 PMDD (PREMENSTRUAL DYSPHORIC DISORDER): Primary | ICD-10-CM

## 2020-12-03 DIAGNOSIS — F31.60 BIPOLAR DISORDER, MIXED (HCC): ICD-10-CM

## 2020-12-03 PROCEDURE — 99213 OFFICE O/P EST LOW 20 MIN: CPT | Performed by: PHYSICIAN ASSISTANT

## 2020-12-03 RX ORDER — SERTRALINE HYDROCHLORIDE 25 MG/1
25 TABLET, FILM COATED ORAL DAILY
Qty: 14 TABLET | Refills: 0 | Status: SHIPPED | OUTPATIENT
Start: 2020-12-03 | End: 2020-12-25 | Stop reason: SDUPTHER

## 2020-12-03 RX ORDER — LITHIUM CARBONATE 300 MG/1
CAPSULE ORAL
Qty: 90 CAPSULE | Refills: 3 | Status: SHIPPED | OUTPATIENT
Start: 2020-12-03 | End: 2020-12-14 | Stop reason: SDUPTHER

## 2020-12-03 NOTE — PROGRESS NOTES
You have chosen to receive care through a telehealth visit.  Do you consent to use a video/audio connection for your medical care today? Yes  Active parties: Amy Connelly PA-C, Mervat Akbar CMA, Nicole Altman        Nicoledelmar Altman is a 40 y.o. female.     Subjective   History of Present Illness   Patient presents today via video visit for follow-up of premenstrual dysphoric disorder.  1 month ago she was prescribed a 7-day course of Prozac 10 mg to be initiated 1 week before anticipated onset of menses which she took as directed, however, menses started 3 days later than expected.  She reports that while taking the Prozac her premenstrual dysphoric disorder symptoms were not as severe as previously, as suicidal ideation was not nearly as prominent.  She reports that she did not feel great while taking Prozac as it seemed to make her feel frantic and angry inside which she vaguely remembers occurring in the past when she has tried Prozac.  She denies any symptoms of carlos manuel during Prozac use or symptoms thereafter.  LMP began on 11/25/2020.  Bipolar disorder continues to be well controlled with Saphris and she reports that overall she is feeling calmer, more levelheaded and is now sleeping well.    Her gynecologist discontinued estradiol use a little over 1 month ago and she was supposed to return for labs around 10 days later which she has not yet completed due to not wanting to go into the hospital during this COVID-19 pandemic.  She reports that she is willing to have labs done if they can be drawn in my office.        The following portions of the patient's history were reviewed and updated as appropriate: allergies, current medications, past family history, past medical history, past social history, past surgical history and problem list.    Review of Systems   Constitutional: Negative for activity change, chills, fatigue and fever.   HENT: Negative.    Eyes: Negative.  Negative for visual disturbance.    Respiratory: Negative for cough, chest tightness, shortness of breath and wheezing.    Cardiovascular: Negative for chest pain, palpitations and leg swelling.   Gastrointestinal: Negative for abdominal pain, constipation, diarrhea, nausea and vomiting.   Endocrine: Negative for polydipsia, polyphagia and polyuria.   Genitourinary: Positive for menstrual problem. Negative for decreased urine volume, difficulty urinating, dyspareunia, genital sores, urinary incontinence and vaginal bleeding.   Musculoskeletal: Negative.    Skin: Negative.    Allergic/Immunologic: Negative for immunocompromised state.   Neurological: Negative for dizziness, speech difficulty, weakness, headache and confusion.   Hematological: Negative for adenopathy. Does not bruise/bleed easily.   Psychiatric/Behavioral: Positive for agitation, dysphoric mood, suicidal ideas (improved) and depressed mood (PMDD- improved). Negative for decreased concentration, sleep disturbance (resolved), negative for hyperactivity and stress. The patient is nervous/anxious (stable).          Objective    Physical Exam  Vitals signs and nursing note reviewed.   Constitutional:       General: She is not in acute distress.     Appearance: She is well-developed. She is obese. She is not ill-appearing, toxic-appearing or diaphoretic.   HENT:      Head: Normocephalic and atraumatic.   Pulmonary:      Effort: Pulmonary effort is normal. No respiratory distress.   Skin:     Coloration: Skin is not jaundiced or pale.      Findings: No erythema or rash.   Neurological:      Mental Status: She is alert and oriented to person, place, and time.      Coordination: Coordination normal.   Psychiatric:         Mood and Affect: Mood normal.         Behavior: Behavior normal.         Thought Content: Thought content normal.         Judgment: Judgment normal.           LMP 11/25/2020 (Exact Date)           Assessment/Plan   Diagnoses and all orders for this visit:    1. PMDD  (premenstrual dysphoric disorder) (Primary)  -     Estradiol  -     Testosterone, Free, Total  -     Follicle Stimulating Hormone  -     sertraline (Zoloft) 25 MG tablet; Take 1 tablet by mouth Daily. Begin 11 days prior to onset of period and continue for 14 days total. First dose 12/14/2020.  Dispense: 14 tablet; Refill: 0    2. Normocytic anemia  -     Folate  -     MTHFR Mutation    3. Bipolar disorder, mixed (CMS/HCC)  -     Folate  -     MTHFR Mutation    Labs previously ordered by gynecologist have been reordered in my name today so that they may be drawn in our lab since she does not want to visit the hospital lab during the COVID-19 pandemic.  Lab results will be forwarded to her gynecologist when available.    She did have some improvement in PMDD symptoms with Prozac use during the luteal phase, however she did not like how Prozac made her feel.  Will change Prozac to Zoloft and extend treatment time to begin around 11 days prior to menses onset and continue until 2 to 3 days after onset for a total of 14 days/month.   We will revisit after 1 month with video visit to assess efficacy.    Risks, benefits and potential side effects of medication reviewed and patient agrees to use.          PATRICIA Mark  12/03/2020  13:05 EST

## 2020-12-14 DIAGNOSIS — F31.60 BIPOLAR DISORDER, MIXED (HCC): ICD-10-CM

## 2020-12-14 RX ORDER — LITHIUM CARBONATE 300 MG/1
300 CAPSULE ORAL 2 TIMES DAILY WITH MEALS
Qty: 60 CAPSULE | Refills: 3 | Status: ON HOLD | OUTPATIENT
Start: 2020-12-14 | End: 2021-01-10

## 2020-12-22 ENCOUNTER — OFFICE VISIT (OUTPATIENT)
Dept: INTERNAL MEDICINE | Facility: CLINIC | Age: 40
End: 2020-12-22

## 2020-12-22 VITALS
DIASTOLIC BLOOD PRESSURE: 76 MMHG | WEIGHT: 256 LBS | SYSTOLIC BLOOD PRESSURE: 118 MMHG | HEIGHT: 66 IN | OXYGEN SATURATION: 98 % | HEART RATE: 79 BPM | TEMPERATURE: 98.6 F | BODY MASS INDEX: 41.14 KG/M2

## 2020-12-22 DIAGNOSIS — R10.2 ACUTE SUPRAPUBIC PAIN: Primary | ICD-10-CM

## 2020-12-22 DIAGNOSIS — F32.81 PMDD (PREMENSTRUAL DYSPHORIC DISORDER): ICD-10-CM

## 2020-12-22 LAB
BILIRUB BLD-MCNC: NEGATIVE MG/DL
CLARITY, POC: CLEAR
COLOR UR: YELLOW
GLUCOSE UR STRIP-MCNC: NEGATIVE MG/DL
KETONES UR QL: NEGATIVE
LEUKOCYTE EST, POC: NEGATIVE
NITRITE UR-MCNC: NEGATIVE MG/ML
PH UR: 6 [PH] (ref 5–8)
PROT UR STRIP-MCNC: NEGATIVE MG/DL
RBC # UR STRIP: ABNORMAL /UL
SP GR UR: 1.02 (ref 1–1.03)
UROBILINOGEN UR QL: NORMAL

## 2020-12-22 PROCEDURE — 81003 URINALYSIS AUTO W/O SCOPE: CPT | Performed by: PHYSICIAN ASSISTANT

## 2020-12-22 PROCEDURE — 99213 OFFICE O/P EST LOW 20 MIN: CPT | Performed by: PHYSICIAN ASSISTANT

## 2020-12-22 RX ORDER — ASENAPINE MALEATE 5 MG/1
TABLET SUBLINGUAL
COMMUNITY
Start: 2020-12-03 | End: 2021-01-07

## 2020-12-22 NOTE — PROGRESS NOTES
Nicole Altman is a 40 y.o. female.     Subjective   History of Present Illness   Here today with concern of lower abdominal pain.  2 nights ago she developed waxing and waning suprapubic pain which can spread to both sides of the lower abdomen.  The pain reportedly feels like gas or pelvic pressure. Urinating and defecating help ease pain a little.  She denies any constipation, vaginal bleeding, abnormal vaginal discharge, fever or chills.  The pain was worse this morning but eased up after having a bowel movement this morning. Eating does not worsen symptoms. No low back pain, flank pain, dysuria, urgency, hematuria or increased frequency.  LMP occurred around 1 month ago and is due to start within the next few days.      The following portions of the patient's history were reviewed and updated as appropriate: allergies, current medications, past family history, past medical history, past social history, past surgical history and problem list.    Review of Systems   Constitutional: Negative for activity change, chills, fatigue and fever.   HENT: Negative.    Eyes: Negative.  Negative for visual disturbance.   Respiratory: Negative for cough, chest tightness, shortness of breath and wheezing.    Cardiovascular: Negative for chest pain, palpitations and leg swelling.   Gastrointestinal: Positive for abdominal pain. Negative for abdominal distention, anal bleeding, blood in stool, constipation, diarrhea, nausea, vomiting and indigestion.   Endocrine: Negative for polydipsia, polyphagia and polyuria.   Genitourinary: Negative.  Positive for pelvic pressure. Negative for decreased urine volume, difficulty urinating, dysuria, frequency, hematuria, pelvic pain, urgency, urinary incontinence, vaginal bleeding, vaginal discharge and vaginal pain.   Musculoskeletal: Negative.    Skin: Negative.    Allergic/Immunologic: Negative for immunocompromised state.   Neurological: Negative for dizziness, speech difficulty,  weakness, headache and confusion.   Hematological: Negative for adenopathy. Does not bruise/bleed easily.         Objective    Physical Exam  Vitals signs and nursing note reviewed.   Constitutional:       General: She is not in acute distress.     Appearance: She is well-developed. She is obese. She is not ill-appearing, toxic-appearing or diaphoretic.   HENT:      Head: Normocephalic and atraumatic.   Eyes:      General: No scleral icterus.     Extraocular Movements: Extraocular movements intact.      Conjunctiva/sclera: Conjunctivae normal.      Pupils: Pupils are equal, round, and reactive to light.   Neck:      Musculoskeletal: Normal range of motion and neck supple. No muscular tenderness.   Cardiovascular:      Rate and Rhythm: Normal rate and regular rhythm.      Heart sounds: Normal heart sounds. No murmur. No friction rub. No gallop.    Pulmonary:      Effort: Pulmonary effort is normal. No respiratory distress.      Breath sounds: Normal breath sounds. No stridor. No wheezing, rhonchi or rales.   Chest:      Chest wall: No tenderness.   Abdominal:      General: Bowel sounds are normal. There is no distension.      Palpations: Abdomen is soft. There is no mass.      Tenderness: There is abdominal tenderness in the right lower quadrant, suprapubic area and left lower quadrant. There is no right CVA tenderness, left CVA tenderness, guarding or rebound. Negative signs include Elias's sign, Rovsing's sign, McBurney's sign, psoas sign and obturator sign.      Hernia: No hernia is present.   Skin:     General: Skin is warm and dry.      Capillary Refill: Capillary refill takes less than 2 seconds.      Findings: No erythema, lesion or rash.   Neurological:      General: No focal deficit present.      Mental Status: She is alert and oriented to person, place, and time.      Cranial Nerves: No cranial nerve deficit.      Sensory: No sensory deficit.      Motor: No abnormal muscle tone.      Coordination:  "Coordination normal.      Gait: Gait normal.      Deep Tendon Reflexes: Reflexes normal.   Psychiatric:         Mood and Affect: Mood normal.         Thought Content: Thought content normal.         Judgment: Judgment normal.           /76   Pulse 79   Temp 98.6 °F (37 °C)   Ht 167.6 cm (65.98\")   Wt 116 kg (256 lb)   LMP 11/25/2020 (Exact Date)   SpO2 98%   BMI 41.34 kg/m²     Nursing note and vitals reviewed.          Assessment/Plan   Diagnoses and all orders for this visit:    1. Acute suprapubic pain (Primary)  -     POC Urinalysis Dipstick, Automated  Brief Urine Lab Results  (Last result in the past 365 days)      Color   Clarity   Blood   Leuk Est   Nitrite   Protein   CREAT   Urine HCG        12/22/20 1018 Yellow Clear 1+ Negative Negative Negative               Suprapubic pain most likely due to imminent onset of menses.  Will have her monitor symptoms at home and notify me or proceed to a local emergency department with any acutely worsened symptoms.                 Return in about 1 month (around 1/22/2021) for medicare wellness.      PATRICIA Mark  12/22/2020  09:54 EST  "

## 2020-12-25 RX ORDER — SERTRALINE HYDROCHLORIDE 25 MG/1
25 TABLET, FILM COATED ORAL DAILY
Qty: 14 TABLET | Refills: 5 | Status: SHIPPED | OUTPATIENT
Start: 2020-12-25 | End: 2021-01-05 | Stop reason: SDUPTHER

## 2021-01-05 DIAGNOSIS — F32.81 PMDD (PREMENSTRUAL DYSPHORIC DISORDER): ICD-10-CM

## 2021-01-05 RX ORDER — SERTRALINE HYDROCHLORIDE 25 MG/1
25 TABLET, FILM COATED ORAL DAILY
Qty: 30 TABLET | Refills: 3 | Status: SHIPPED | OUTPATIENT
Start: 2021-01-05 | End: 2021-01-14 | Stop reason: SDUPTHER

## 2021-01-06 RX ORDER — IRBESARTAN 300 MG/1
TABLET ORAL
Qty: 30 TABLET | Refills: 0 | Status: SHIPPED | OUTPATIENT
Start: 2021-01-06 | End: 2021-02-15

## 2021-01-07 RX ORDER — ASENAPINE MALEATE 5 MG/1
TABLET SUBLINGUAL
Qty: 60 TABLET | Refills: 0 | Status: ON HOLD | OUTPATIENT
Start: 2021-01-07 | End: 2021-01-10

## 2021-01-07 NOTE — TELEPHONE ENCOUNTER
I contacted patient to clarify medication and she states that Amy had previously prescribed for her and she had been on it for several months.  She states that she has not had any side effects and does know how to take it appropriately.  Rx was sent in.

## 2021-01-10 ENCOUNTER — HOSPITAL ENCOUNTER (INPATIENT)
Facility: HOSPITAL | Age: 41
LOS: 2 days | Discharge: HOME | End: 2021-01-12
Attending: PSYCHIATRY & NEUROLOGY | Admitting: PSYCHIATRY & NEUROLOGY

## 2021-01-10 ENCOUNTER — HOSPITAL ENCOUNTER (EMERGENCY)
Facility: HOSPITAL | Age: 41
Discharge: ANOTHER HOSPITAL | End: 2021-01-10
Attending: EMERGENCY MEDICINE | Admitting: EMERGENCY MEDICINE

## 2021-01-10 VITALS
TEMPERATURE: 98.9 F | SYSTOLIC BLOOD PRESSURE: 137 MMHG | BODY MASS INDEX: 42.36 KG/M2 | HEART RATE: 86 BPM | HEIGHT: 66 IN | DIASTOLIC BLOOD PRESSURE: 90 MMHG | RESPIRATION RATE: 18 BRPM | OXYGEN SATURATION: 98 % | WEIGHT: 263.6 LBS

## 2021-01-10 DIAGNOSIS — F31.9 BIPOLAR AFFECTIVE DISORDER, REMISSION STATUS UNSPECIFIED (HCC): Primary | ICD-10-CM

## 2021-01-10 PROBLEM — R45.851 SUICIDAL IDEATIONS: Status: ACTIVE | Noted: 2021-01-10

## 2021-01-10 LAB
ALBUMIN SERPL-MCNC: 4.2 G/DL (ref 3.5–5.2)
ALBUMIN/GLOB SERPL: 1.3 G/DL
ALP SERPL-CCNC: 106 U/L (ref 39–117)
ALT SERPL W P-5'-P-CCNC: 9 U/L (ref 1–33)
AMPHET+METHAMPHET UR QL: NEGATIVE
AMPHETAMINES UR QL: NEGATIVE
ANION GAP SERPL CALCULATED.3IONS-SCNC: 12.3 MMOL/L (ref 5–15)
APAP SERPL-MCNC: <5 MCG/ML (ref 0–30)
AST SERPL-CCNC: 13 U/L (ref 1–32)
B-HCG UR QL: NEGATIVE
BACTERIA UR QL AUTO: ABNORMAL /HPF
BARBITURATES UR QL SCN: NEGATIVE
BASOPHILS # BLD AUTO: 0.1 10*3/MM3 (ref 0–0.2)
BASOPHILS NFR BLD AUTO: 0.9 % (ref 0–1.5)
BENZODIAZ UR QL SCN: NEGATIVE
BILIRUB SERPL-MCNC: 0.4 MG/DL (ref 0–1.2)
BILIRUB UR QL STRIP: NEGATIVE
BUN SERPL-MCNC: 13 MG/DL (ref 6–20)
BUN/CREAT SERPL: 14.4 (ref 7–25)
BUPRENORPHINE SERPL-MCNC: NEGATIVE NG/ML
CALCIUM SPEC-SCNC: 9.6 MG/DL (ref 8.6–10.5)
CANNABINOIDS SERPL QL: NEGATIVE
CHLORIDE SERPL-SCNC: 103 MMOL/L (ref 98–107)
CLARITY UR: CLEAR
CO2 SERPL-SCNC: 21.7 MMOL/L (ref 22–29)
COCAINE UR QL: NEGATIVE
COLOR UR: YELLOW
CREAT SERPL-MCNC: 0.9 MG/DL (ref 0.57–1)
DEPRECATED RDW RBC AUTO: 44.5 FL (ref 37–54)
EOSINOPHIL # BLD AUTO: 0.45 10*3/MM3 (ref 0–0.4)
EOSINOPHIL NFR BLD AUTO: 4.2 % (ref 0.3–6.2)
ERYTHROCYTE [DISTWIDTH] IN BLOOD BY AUTOMATED COUNT: 14.6 % (ref 12.3–15.4)
ETHANOL BLD-MCNC: 18 MG/DL (ref 0–10)
ETHANOL UR QL: 0.02 %
GFR SERPL CREATININE-BSD FRML MDRD: 69 ML/MIN/1.73
GLOBULIN UR ELPH-MCNC: 3.2 GM/DL
GLUCOSE SERPL-MCNC: 105 MG/DL (ref 65–99)
GLUCOSE UR STRIP-MCNC: NEGATIVE MG/DL
HCT VFR BLD AUTO: 42.6 % (ref 34–46.6)
HGB BLD-MCNC: 13.3 G/DL (ref 12–15.9)
HGB UR QL STRIP.AUTO: ABNORMAL
HYALINE CASTS UR QL AUTO: ABNORMAL /LPF
IMM GRANULOCYTES # BLD AUTO: 0.03 10*3/MM3 (ref 0–0.05)
IMM GRANULOCYTES NFR BLD AUTO: 0.3 % (ref 0–0.5)
KETONES UR QL STRIP: NEGATIVE
LEUKOCYTE ESTERASE UR QL STRIP.AUTO: NEGATIVE
LITHIUM SERPL-SCNC: 0.3 MMOL/L (ref 0.6–1.2)
LYMPHOCYTES # BLD AUTO: 2.17 10*3/MM3 (ref 0.7–3.1)
LYMPHOCYTES NFR BLD AUTO: 20.1 % (ref 19.6–45.3)
MCH RBC QN AUTO: 26.1 PG (ref 26.6–33)
MCHC RBC AUTO-ENTMCNC: 31.2 G/DL (ref 31.5–35.7)
MCV RBC AUTO: 83.7 FL (ref 79–97)
METHADONE UR QL SCN: NEGATIVE
MONOCYTES # BLD AUTO: 0.56 10*3/MM3 (ref 0.1–0.9)
MONOCYTES NFR BLD AUTO: 5.2 % (ref 5–12)
NEUTROPHILS NFR BLD AUTO: 69.3 % (ref 42.7–76)
NEUTROPHILS NFR BLD AUTO: 7.46 10*3/MM3 (ref 1.7–7)
NITRITE UR QL STRIP: NEGATIVE
NRBC BLD AUTO-RTO: 0 /100 WBC (ref 0–0.2)
OPIATES UR QL: NEGATIVE
OXYCODONE UR QL SCN: NEGATIVE
PCP UR QL SCN: NEGATIVE
PH UR STRIP.AUTO: 6 [PH] (ref 5–8)
PLATELET # BLD AUTO: 297 10*3/MM3 (ref 140–450)
PMV BLD AUTO: 10.4 FL (ref 6–12)
POTASSIUM SERPL-SCNC: 4.6 MMOL/L (ref 3.5–5.2)
PROPOXYPH UR QL: NEGATIVE
PROT SERPL-MCNC: 7.4 G/DL (ref 6–8.5)
PROT UR QL STRIP: NEGATIVE
RBC # BLD AUTO: 5.09 10*6/MM3 (ref 3.77–5.28)
RBC # UR: ABNORMAL /HPF
REF LAB TEST METHOD: ABNORMAL
SALICYLATES SERPL-MCNC: <0.3 MG/DL
SARS-COV-2 RDRP RESP QL NAA+PROBE: NORMAL
SARS-COV-2 RNA PNL SPEC NAA+PROBE: NOT DETECTED
SODIUM SERPL-SCNC: 137 MMOL/L (ref 136–145)
SP GR UR STRIP: 1.01 (ref 1–1.03)
SQUAMOUS #/AREA URNS HPF: ABNORMAL /HPF
TRICYCLICS UR QL SCN: NEGATIVE
UROBILINOGEN UR QL STRIP: ABNORMAL
WBC # BLD AUTO: 10.77 10*3/MM3 (ref 3.4–10.8)
WBC UR QL AUTO: ABNORMAL /HPF

## 2021-01-10 PROCEDURE — 82077 ASSAY SPEC XCP UR&BREATH IA: CPT | Performed by: PHYSICIAN ASSISTANT

## 2021-01-10 PROCEDURE — 80307 DRUG TEST PRSMV CHEM ANLYZR: CPT | Performed by: PHYSICIAN ASSISTANT

## 2021-01-10 PROCEDURE — 87635 SARS-COV-2 COVID-19 AMP PRB: CPT | Performed by: PSYCHIATRY & NEUROLOGY

## 2021-01-10 PROCEDURE — 87635 SARS-COV-2 COVID-19 AMP PRB: CPT | Performed by: PHYSICIAN ASSISTANT

## 2021-01-10 PROCEDURE — 80143 DRUG ASSAY ACETAMINOPHEN: CPT | Performed by: PHYSICIAN ASSISTANT

## 2021-01-10 PROCEDURE — 80179 DRUG ASSAY SALICYLATE: CPT | Performed by: PHYSICIAN ASSISTANT

## 2021-01-10 PROCEDURE — 80306 DRUG TEST PRSMV INSTRMNT: CPT | Performed by: PHYSICIAN ASSISTANT

## 2021-01-10 PROCEDURE — 93005 ELECTROCARDIOGRAM TRACING: CPT | Performed by: EMERGENCY MEDICINE

## 2021-01-10 PROCEDURE — 85025 COMPLETE CBC W/AUTO DIFF WBC: CPT | Performed by: PHYSICIAN ASSISTANT

## 2021-01-10 PROCEDURE — 81001 URINALYSIS AUTO W/SCOPE: CPT | Performed by: PHYSICIAN ASSISTANT

## 2021-01-10 PROCEDURE — C9803 HOPD COVID-19 SPEC COLLECT: HCPCS

## 2021-01-10 PROCEDURE — 99283 EMERGENCY DEPT VISIT LOW MDM: CPT

## 2021-01-10 PROCEDURE — 80053 COMPREHEN METABOLIC PANEL: CPT | Performed by: PHYSICIAN ASSISTANT

## 2021-01-10 PROCEDURE — 80178 ASSAY OF LITHIUM: CPT | Performed by: PHYSICIAN ASSISTANT

## 2021-01-10 PROCEDURE — 81025 URINE PREGNANCY TEST: CPT | Performed by: PHYSICIAN ASSISTANT

## 2021-01-10 RX ORDER — LITHIUM CARBONATE 300 MG/1
300 CAPSULE ORAL 2 TIMES DAILY
COMMUNITY
End: 2021-02-05 | Stop reason: SDUPTHER

## 2021-01-10 RX ORDER — BENZTROPINE MESYLATE 1 MG/1
2 TABLET ORAL ONCE AS NEEDED
Status: DISCONTINUED | OUTPATIENT
Start: 2021-01-10 | End: 2021-01-12 | Stop reason: HOSPADM

## 2021-01-10 RX ORDER — ASENAPINE 5 MG/1
10 TABLET SUBLINGUAL 2 TIMES DAILY
COMMUNITY
End: 2021-01-14 | Stop reason: DRUGHIGH

## 2021-01-10 RX ORDER — ASENAPINE 10 MG/1
10 TABLET SUBLINGUAL 2 TIMES DAILY
Status: ON HOLD | COMMUNITY
End: 2021-01-10

## 2021-01-10 RX ORDER — NICOTINE 21 MG/24HR
1 PATCH, TRANSDERMAL 24 HOURS TRANSDERMAL
Status: DISCONTINUED | OUTPATIENT
Start: 2021-01-11 | End: 2021-01-12 | Stop reason: HOSPADM

## 2021-01-10 RX ORDER — ASENAPINE 5 MG/1
10 TABLET SUBLINGUAL 2 TIMES DAILY
Status: DISCONTINUED | OUTPATIENT
Start: 2021-01-11 | End: 2021-01-12 | Stop reason: HOSPADM

## 2021-01-10 RX ORDER — LOPERAMIDE HYDROCHLORIDE 2 MG/1
2 CAPSULE ORAL
Status: DISCONTINUED | OUTPATIENT
Start: 2021-01-10 | End: 2021-01-12 | Stop reason: HOSPADM

## 2021-01-10 RX ORDER — FAMOTIDINE 20 MG/1
20 TABLET, FILM COATED ORAL 2 TIMES DAILY PRN
Status: DISCONTINUED | OUTPATIENT
Start: 2021-01-10 | End: 2021-01-12 | Stop reason: HOSPADM

## 2021-01-10 RX ORDER — METOPROLOL SUCCINATE 100 MG/1
100 TABLET, EXTENDED RELEASE ORAL NIGHTLY
COMMUNITY
End: 2021-03-08 | Stop reason: SDUPTHER

## 2021-01-10 RX ORDER — HYDROXYZINE 50 MG/1
50 TABLET, FILM COATED ORAL EVERY 6 HOURS PRN
Status: DISCONTINUED | OUTPATIENT
Start: 2021-01-10 | End: 2021-01-12 | Stop reason: HOSPADM

## 2021-01-10 RX ORDER — BENZONATATE 100 MG/1
100 CAPSULE ORAL 3 TIMES DAILY PRN
Status: DISCONTINUED | OUTPATIENT
Start: 2021-01-10 | End: 2021-01-12 | Stop reason: HOSPADM

## 2021-01-10 RX ORDER — ALBUTEROL SULFATE 90 UG/1
2 AEROSOL, METERED RESPIRATORY (INHALATION) EVERY 4 HOURS PRN
Status: DISCONTINUED | OUTPATIENT
Start: 2021-01-10 | End: 2021-01-12 | Stop reason: HOSPADM

## 2021-01-10 RX ORDER — PANTOPRAZOLE SODIUM 40 MG/1
40 TABLET, DELAYED RELEASE ORAL NIGHTLY
COMMUNITY
End: 2021-05-13 | Stop reason: SDUPTHER

## 2021-01-10 RX ORDER — ECHINACEA PURPUREA EXTRACT 125 MG
2 TABLET ORAL AS NEEDED
Status: DISCONTINUED | OUTPATIENT
Start: 2021-01-10 | End: 2021-01-12 | Stop reason: HOSPADM

## 2021-01-10 RX ORDER — ACETAMINOPHEN 325 MG/1
650 TABLET ORAL EVERY 6 HOURS PRN
Status: DISCONTINUED | OUTPATIENT
Start: 2021-01-10 | End: 2021-01-12 | Stop reason: HOSPADM

## 2021-01-10 RX ORDER — LOSARTAN POTASSIUM 50 MG/1
100 TABLET ORAL NIGHTLY
Status: DISCONTINUED | OUTPATIENT
Start: 2021-01-11 | End: 2021-01-12 | Stop reason: HOSPADM

## 2021-01-10 RX ORDER — LAMOTRIGINE 100 MG/1
150 TABLET ORAL 2 TIMES DAILY
Status: DISCONTINUED | OUTPATIENT
Start: 2021-01-11 | End: 2021-01-12 | Stop reason: HOSPADM

## 2021-01-10 RX ORDER — ONDANSETRON 4 MG/1
4 TABLET, FILM COATED ORAL EVERY 6 HOURS PRN
Status: DISCONTINUED | OUTPATIENT
Start: 2021-01-10 | End: 2021-01-12 | Stop reason: HOSPADM

## 2021-01-10 RX ORDER — SUMATRIPTAN 50 MG/1
50 TABLET, FILM COATED ORAL
Status: DISCONTINUED | OUTPATIENT
Start: 2021-01-10 | End: 2021-01-12 | Stop reason: HOSPADM

## 2021-01-10 RX ORDER — PANTOPRAZOLE SODIUM 40 MG/1
40 TABLET, DELAYED RELEASE ORAL NIGHTLY
Status: DISCONTINUED | OUTPATIENT
Start: 2021-01-11 | End: 2021-01-12 | Stop reason: HOSPADM

## 2021-01-10 RX ORDER — BENZTROPINE MESYLATE 1 MG/ML
1 INJECTION INTRAMUSCULAR; INTRAVENOUS ONCE AS NEEDED
Status: DISCONTINUED | OUTPATIENT
Start: 2021-01-10 | End: 2021-01-12 | Stop reason: HOSPADM

## 2021-01-10 RX ORDER — LITHIUM CARBONATE 300 MG/1
300 CAPSULE ORAL 2 TIMES DAILY WITH MEALS
Status: CANCELLED | OUTPATIENT
Start: 2021-01-10

## 2021-01-10 RX ORDER — TRAZODONE HYDROCHLORIDE 50 MG/1
50 TABLET ORAL NIGHTLY PRN
Status: DISCONTINUED | OUTPATIENT
Start: 2021-01-10 | End: 2021-01-12 | Stop reason: HOSPADM

## 2021-01-10 RX ORDER — ALUMINA, MAGNESIA, AND SIMETHICONE 2400; 2400; 240 MG/30ML; MG/30ML; MG/30ML
15 SUSPENSION ORAL EVERY 6 HOURS PRN
Status: DISCONTINUED | OUTPATIENT
Start: 2021-01-10 | End: 2021-01-12 | Stop reason: HOSPADM

## 2021-01-10 RX ORDER — LITHIUM CARBONATE 300 MG/1
300 CAPSULE ORAL 2 TIMES DAILY WITH MEALS
Status: DISCONTINUED | OUTPATIENT
Start: 2021-01-11 | End: 2021-01-12 | Stop reason: HOSPADM

## 2021-01-10 RX ORDER — METOPROLOL SUCCINATE 50 MG/1
100 TABLET, EXTENDED RELEASE ORAL NIGHTLY
Status: DISCONTINUED | OUTPATIENT
Start: 2021-01-10 | End: 2021-01-12 | Stop reason: HOSPADM

## 2021-01-10 RX ORDER — IBUPROFEN 400 MG/1
400 TABLET ORAL EVERY 6 HOURS PRN
Status: DISCONTINUED | OUTPATIENT
Start: 2021-01-10 | End: 2021-01-12 | Stop reason: HOSPADM

## 2021-01-10 RX ORDER — ONDANSETRON 4 MG/1
8 TABLET, ORALLY DISINTEGRATING ORAL EVERY 8 HOURS PRN
Status: CANCELLED | OUTPATIENT
Start: 2021-01-10

## 2021-01-10 RX ADMIN — ASENAPINE MALEATE 10 MG: 5 TABLET SUBLINGUAL at 23:37

## 2021-01-10 RX ADMIN — LAMOTRIGINE 150 MG: 100 TABLET ORAL at 23:37

## 2021-01-10 RX ADMIN — METOPROLOL SUCCINATE 100 MG: 50 TABLET, EXTENDED RELEASE ORAL at 23:37

## 2021-01-10 RX ADMIN — LOSARTAN POTASSIUM 100 MG: 50 TABLET, FILM COATED ORAL at 23:37

## 2021-01-10 NOTE — CONSULTS
Nicole Altman  1980    TIME: [id number]    Is patient agreeable to admission/treatment? Yes    Guardian: (Must have paperwork) FAMILY MEMBER - GUARDIAN: Self    Guardian Name/Contact/etc: N/A    Pt Lives With: Alone at [facility] extended living apartments    Highest Level of Education: high school diploma/GED     Presenting Problems: (How is the patient a danger to self or others?)  Patient reports she was recently given an extra prescription of Zoloft from local pharmacy and now she is tempted to take extra medication as she is seeking to induce a manic episode.      Current Stressors: mental health condition    Depression: Patient reports no depression at this time     Anxiety: 10    Previous Psychiatric Treatment: Yes    If yes, describe: Patient reports she has received care for multiple counselors and psychiatrists.  Patient reports she is currently receiving counseling from Mar Mireles LCSW and Dr. Amy Amador [facility] for medication management.  Patient reports she has had multiple inpatient admissions.    Last inpatient admission: [facility] 6/3/2018    Number of admissions: Patient reports numerous inpatient admissions.    Last outpatient visit: The week of December 28, 2020 patient reports counseling session with Mar Mireles LCSW    Suicidal: Patient reports baseline SI but is not currently endorsing    Previous Attempts: 1  prior suicide attempt    Number of Previous Attempts: 1    Most Recent Attempt: Approximately 2015  COLUMBIA-SUICIDE SEVERITY RATING SCALE  Psychiatric Inpatient Setting - Discharge Screener    Ask questions that are bold and underlined Discharge   Ask Questions 1 and 2 YES NO   1) Wish to be Dead:   Person endorses thoughts about a wish to be dead or not alive anymore, or wish to fall asleep and not wake up.  While you were here in the hospital, have you wished you were dead or wished you could go to sleep and not wake up?   X    2) Suicidal  Thoughts:   General non-specific thoughts of wanting to end one's life/die by suicide, “I've thought about killing myself” without general thoughts of ways to kill oneself/associated methods, intent, or plan.   While you were here in the hospital, have you actually had thoughts about killing yourself?    X    If YES to 2, ask questions 3, 4, 5, and 6.  If NO to 2, go directly to question 6   3) Suicidal Thoughts with Method (without Specific Plan or Intent to Act):   Person endorses thoughts of suicide and has thought of a least one method during the assessment period. This is different than a specific plan with time, place or method details worked out. “I thought about taking an overdose but I never made a specific plan as to when where or how I would actually do it….and I would never go through with it.”   Have you been thinking about how you might kill yourself?    X    4) Suicidal Intent (without Specific Plan):   Active suicidal thoughts of killing oneself and patient reports having some intent to act on such thoughts, as opposed to “I have the thoughts but I definitely will not do anything about them.”   Have you had these thoughts and had some intention of acting on them or do you have some intention of acting on them after you leave the hospital?     X   5) Suicide Intent with Specific Plan:   Thoughts of killing oneself with details of plan fully or partially worked out and person has some intent to carry it out.   Have you started to work out or worked out the details of how to kill yourself either for while you were here in the hospital or for after you leave the hospital? Do you intend to carry out this plan?     X     6) Suicide Behavior    While you were here in the hospital, have you done anything, started to do anything, or prepared to do anything to end your life?    Examples: Took pills, cut yourself, tried to hang yourself, took out pills but didn't swallow any because you changed your mind or  someone took them from you, collected pills, secured a means of obtaining a gun, gave away valuables, wrote a will or suicide note, etc.   X       Family Hx of Mental Health/Substance Abuse: Patient reports biological sister has history of baseline SI and mother diagnosed with OCD    Delusions: Patient currently presenting with linear thought     Hallucinations: Patient does not appear to be responding to internal stimuli at this time    Mood: anxious     Homicidal Ideations: Absent     Abuse History: History of physical abuse: no     Does this require reporting: N/A    Legal History / History of Violence: The patient has no significant history of legal issues.     Sleep: Good    Appetite: Good    Current Medical Conditions: Yes    If yes, explain: Essential hypertension, bipolar disorder, mixed    Current Psychiatric Medications: Zoloft 25mg, lithium 300mg, Lamictal 150 mg, Saphris 5mg,      History of Inappropriate Sexual Behavior: No    Hopelessness: Mild    Orientation: alert and oriented to person, place, and time     Substance Abuse: Patient reports she is in recovery    COWS: N/A    CIWA:  N/A    Withdrawal Symptoms: N/A     History of DT's: N/A    History of Seizures: No    SUBSTANCE ABUSE HISTORY:      DRUG   PRESENT USE  Y/N   AGE @ 1ST USE    ROUTE   HOW MUCH   HOW OFTEN   HOW LONG AT THIS RATE   Date of last use/  Amount used   Nicotine   Yes  teens  smoke  1 pack  daily  since teens  today   Alcohol   No         Marijuana   No         Benzos     No         Neurontin   No         Methadone   No         Opiates     No         Cocaine    No         Heroin   No         Meth   No         Suboxone   No           If in active addiction, do living arrangements affect recovery?:  Patient is currently in recovery      DATA:   This therapist received a call from [facility] staff (VANNA Robledo) with orders from (PATRICIA Johnston) for a behavioral health consult.  The patient serves as her own guardian and is agreeable to  "speak with me.  Met with patient and friend Santa at bedside. Patient is not under 1:1 security monitoring during assessment.  Patient is a 40 year old, not , , female residing in [place], [place]. Patient currently lives alone.  Patient is unemployed, disabled.      Patient presents today with chief compliant of  psychiatric instability and anxiety.  Patient reports she has a long history of mental illness and psychiatric care.  Patient lives in sober living home after completing residential substance abuse program at [facility].  Patient reports her sobriety date is 2015.  Patient is accompanied by her friend Santa.  Patient requested her friend remain in the room during assessment.  Patient reports to [facility] today feeling as though she is becoming out of control and she has begun over taking her medication to induce manic episode.  Per patient, she has a standing diagnosis of borderline disorder and currently sees Dr. Amy Amador [facility] for medication management.patient also receives counseling from Mar Mireles LCSW.  Patient reports multiple and various hospitalizations throughout her life.  Patient reports she has attempted suicide 1 time in the past via ingestion of 1 full bottle of propanolol and 1 full bottle of blood pressure medication.  Patient reports \"I nearly .\"  Patient reports she has felt stable with her current medications and psychotherapy however after receiving an extra prescription of Zoloft has been hyper focused on overtaking medication to induce feelings of carlos manuel.  Hospital records indicate patient has received inpatient stabilization for similar presentations to ED.  Patient reports \"I need some help now or I am just going to run out of here.  I just need some help.\"  VANNA Robledo reports patient has eaten multiple snacks while in the ED and continues to request food.  Patient reports she is not currently SI but within the " "last week has been \"tempted to just overtake all of my medications to get it over with.\"  Patient does endorse over taking the prescribed amount of Zoloft to induce carlos manuel by at least 1 extra pill per dose.    Patient reports to be agreeable for treatment recommendations.     ASSESSMENT:    Therapist completed CSSRS with patient for suicide risk assessment.  The results of patient’s CSSRS suggest that patient is endorsing baseline and recent SI but currently denying SI in ED today.  Patient holds attention and is Cooperative with assessment.  Patient’s appearance is clean and casually dressed, appropriate.  The patient displays Restless psychomotor behavior. The patient's affect appears increased in intensity. The patient is observed to have fast and pressured speech.   Patient observed to have Good eye contact. The patient's displays limited insight, with poor impulse control and Poor judgement.  Patient's judgment appears severely impaired as evidenced by her inability to remain in a static position as well as her statement \"I feel like just running out of her right now.\"  This is also evident as the patient endorsed overtaking her prescription of Zoloft.  There is no notice of flight of ideas at this point however there is some distractibility.  The patient's body language appears anxious, fidgety, with an increase of motor impairments as she rapidly paces the room.    PLAN:    At this time, therapist recommends a referral be sent to psychiatrist to review for potential admission. Patient reports to be agreeable to the recommendations.  Therapist informed [facility] ED treatment team members, VANNA Robledo and PATRICIA Johnston who are agreeable to plan.      1523: Therapist phoned [facility] and spoke to nathen Estrada RN, to present case.  Natalie requested to have patient name and birthdate be emailed to her as she is currently off the unit floor.    1527: Therapist phoned [facility] to present case and per Sanam they " are currently on diversion.    1528: Therapist phoned [facility] to present case and per Víctor they are currently on diversion.    1529: [facility] and spoke with Rochelle.  Therapist will fax clinicals for case presentation.    1530 Therapist phoned [facility] and spoke with Altagracia.  Therapist will fax clinicals for case presentation.    1537:  Therapist phoned [facility] and per Karena they are currently on diversion.    1638: Natalie sena RN at [facility] phone and reports patient will be accepted once they receive COVID-19 test and blood pressure is at acceptable level.  Therapist informed PATRICIA Johnston and VANNA Robledo.  Therapist will follow-up for transfer information ASAP.    1732: Therapist phoned [facility] and reported update on blood pressure and negative COVID-19 test.  Patient will be accepted by Dr. Lauren MD as as communicated by lead RN.  Updated patient and treatment team members who all remain agreeable for transfer.Therapist contacted [facility] for transportation.  Patient to transfer to [facility] upon [facility] arrival.      [facility] ETA to R 1830 and [address] [facility].     [facility]oon Roman, JAYW, CSW  1/10/2021
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
## 2021-01-11 PROBLEM — F31.9 BIPOLAR 1 DISORDER (HCC): Status: ACTIVE | Noted: 2018-06-02

## 2021-01-11 LAB
ETHANOL BLD-MCNC: <10 MG/DL (ref 0–10)
ETHANOL UR QL: <0.01 %

## 2021-01-11 PROCEDURE — 90686 IIV4 VACC NO PRSV 0.5 ML IM: CPT | Performed by: PSYCHIATRY & NEUROLOGY

## 2021-01-11 PROCEDURE — 25010000002 INFLUENZA VAC SPLIT QUAD 0.5 ML SUSPENSION PREFILLED SYRINGE: Performed by: PSYCHIATRY & NEUROLOGY

## 2021-01-11 PROCEDURE — G0008 ADMIN INFLUENZA VIRUS VAC: HCPCS | Performed by: PSYCHIATRY & NEUROLOGY

## 2021-01-11 PROCEDURE — 82077 ASSAY SPEC XCP UR&BREATH IA: CPT | Performed by: PSYCHIATRY & NEUROLOGY

## 2021-01-11 PROCEDURE — 99223 1ST HOSP IP/OBS HIGH 75: CPT | Performed by: PSYCHIATRY & NEUROLOGY

## 2021-01-11 RX ADMIN — Medication 1 PATCH: at 08:41

## 2021-01-11 RX ADMIN — LAMOTRIGINE 150 MG: 100 TABLET ORAL at 08:42

## 2021-01-11 RX ADMIN — LOSARTAN POTASSIUM 100 MG: 50 TABLET, FILM COATED ORAL at 20:24

## 2021-01-11 RX ADMIN — ASENAPINE MALEATE 10 MG: 5 TABLET SUBLINGUAL at 20:25

## 2021-01-11 RX ADMIN — LITHIUM CARBONATE 300 MG: 300 CAPSULE, GELATIN COATED ORAL at 20:24

## 2021-01-11 RX ADMIN — LITHIUM CARBONATE 300 MG: 300 CAPSULE, GELATIN COATED ORAL at 09:45

## 2021-01-11 RX ADMIN — PANTOPRAZOLE SODIUM 40 MG: 40 TABLET, DELAYED RELEASE ORAL at 20:24

## 2021-01-11 RX ADMIN — LITHIUM CARBONATE 300 MG: 300 CAPSULE, GELATIN COATED ORAL at 01:02

## 2021-01-11 RX ADMIN — SERTRALINE 25 MG: 50 TABLET, FILM COATED ORAL at 08:41

## 2021-01-11 RX ADMIN — LAMOTRIGINE 150 MG: 100 TABLET ORAL at 20:24

## 2021-01-11 RX ADMIN — INFLUENZA VIRUS VACCINE 0.5 ML: 15; 15; 15; 15 SUSPENSION INTRAMUSCULAR at 08:45

## 2021-01-11 RX ADMIN — METOPROLOL SUCCINATE 100 MG: 50 TABLET, EXTENDED RELEASE ORAL at 20:24

## 2021-01-11 RX ADMIN — ASENAPINE MALEATE 10 MG: 5 TABLET SUBLINGUAL at 08:42

## 2021-01-11 NOTE — PROGRESS NOTES
1345:     DATA:         Therapist met individually with patient this date to introduce role and to discuss hospitalization expectations. Patient agreeable. Met with patient with Dr. Aguilar.  Patient focused on discharge this date, but will not sign consent for therapist to speak with anyone at sober living.  Patient reports that she can return to sober living and does not need to confirm this.      1640:     Therapist gained consent to speak with patient's emergency contact Santa at [phone]; Santa was supportive and aware that patient was in the hospital. Santa was supportive of patient returning to sober living and reassured her that sometimes there or errors or trace amounts of alcohol in things and to not to be too upset with her self.       Clinical Maneuvering/Intervention:     Therapist assisted patient in processing above session content; acknowledged and normalized patient’s thoughts, feelings, and concerns.  Discussed the therapist/patient relationship and explain the parameters and limitations of relative confidentiality.  Also discussed the importance of active participation, and honesty to the treatment process.  Encouraged the patient to discuss/vent their feelings, frustrations, and fears concerning their ongoing medical issues and validated their feelings.     Discussed the importance of finding enjoyable activities and coping skills that the patient can engage in a regular basis. Discussed healthy coping skills such as distraction, self love, grounding, thought challenges/reframing, etc.  Provided patient with list of healthy coping skills this date. Discussed the importance of medication compliance.  Praised the patient for seeking help and spent the majority of the session building rapport.       Allowed patient to freely discuss issues without interruption or judgment. Provided safe, confidential environment to facilitate the development of positive therapeutic relationship and encourage open,  honest communication.      Therapist addressed discharge safety planning this date. Assisted patient in identifying risk factors which would indicate the need for higher level of care after discharge;  including thoughts to harm self or others and/or self-harming behavior. Encouraged patient to call 911, or present to the nearest emergency room should any of these events occur. Discussed crisis intervention services and means to access.  Encouraged securing any objects of harm.       Therapist completed integrated summary, treatment plan, and initiated social history this date.  Therapist is strongly encouraging family involvement in treatment.      Encouraged mask wearing, social distancing and regular hand washing due to COVID19 risk.      ASSESSMENT:      The patient is a 40 year old , single, female.  Per report,patient was admitted due to complaints of suicidal ideations which were vague but she did admit to taking extra antidepressant medications to induce a manic episode. The patient has a diagnosis of bipolar disorder. Today, patient was seen in the office with  and appeared to become upset when she was encouraged to stay in the hospital today, Patient also became upset when she learned that her labs were positive for ETOH.  Patient reports that she is 5 years sober and become very upset wanting to know how she became positive for ETOH.  Patient denies all alcohol use and asked for her labs to be repeated. Patient reports that she will return to sober living in [place], but refuses to sign consent to involve staff there.    PLAN:       Patient to remain hospitalized this date.     Treatment team will focus efforts on stabilizing patient's acute symptoms while providing education on healthy coping and crisis management to reduce hospitalizations.   Patient requires daily psychiatrist evaluation and 24/7 nursing supervision to promote patient  safety.     Therapist will offer 1-4 individual  sessions, 1 therapy group daily, family education, and appropriate referral.    Therapist recommends continued arrangements with sober living.  Patient reports that she will return to sober living and refuses to sign consent to to allow therapist to speak to anyone. She reports that she will call her mother for transportation.

## 2021-01-11 NOTE — DISCHARGE INSTR - APPOINTMENTS
[facility]  [address], [place] [id number] [phone]    January 13 2021 at 2:00pm with Mar Mireles
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
## 2021-01-11 NOTE — NURSING NOTE
"Patient is a direct admit from [facility]. Pt has 2 prior admissions to this facility with the last admission being 6/2/18. Pt reports hx of numerous psych admissions. Pt states that she has had a total of 26 ECT treatments in the past. Pt states she had first ECT tx at [facility] \"years ago\" and last ECT treatments at the [facility]. Pt denies any current SI. Per [facility] documentation patient had baseline SI, but was not currently endorsing SI. Pt reports hx of 1 SI attempt to OD on her BP meds 5 yrs ago in which she ended up admitted to ICU at , pt unable to recall length of this admission. Pt denies any current stressor. Pt states that she tried to induce carlos manuel today by taking 50mg of Zoloft instead of her normal dose of 25mg. She reports that she then sought help to stay safe. Pt states she had not taken her Lithium, Lamictal or Saphris today. Pt denies any current stressor. Pt reports sleep and appetite are good. Pt denies any HI or hallucinations. Pt rates anxiety as 8 and denies depression. Pt has history of alcohol abuse but reports she has been sober for 5 years. She currently lives in a sober living facility in [place], [place] that she states is called [facility]. Pt states that she sees Amy Connelly with [facility] for her psych medications and sees Mar Mireles in [place] via Zoom for therapy.   "

## 2021-01-11 NOTE — PLAN OF CARE
Problem: Adult Behavioral Health Plan of Care  Goal: Plan of Care Review  Outcome: Ongoing, Progressing  Flowsheets (Taken 1/11/2021 1524)  Consent Given to Review Plan with: Refusing  Progress: no change  Plan of Care Reviewed With: patient  Patient Agreement with Plan of Care: agrees  Outcome Summary: New admit. Completed social history and integrated summary  Goal: Patient-Specific Goal (Individualization)  Outcome: Ongoing, Progressing  Flowsheets  Taken 1/11/2021 1524 by Arminda Freire  Patient-Specific Goals (Include Timeframe): Patient will deny SI/HI prior to discharge .Patient will identify 1 healthy coping skill for depression prior to discharge.. Patient will consent to appropriate aftercare plan prior to discharge.  Individualized Care Needs: Patient would benefit from outpaitent psych referral.  Taken 1/11/2021 1357 by Arminda Freire  Patient Personal Strengths:  • resourceful  • community support  Patient Vulnerabilities:  • substance abuse/addiction  • lacks insight into illness  • limited social skills  Taken 1/10/2021 2147 by Jennifer Collier RN  Anxieties, Fears or Concerns: None verbalized  Goal: Adheres to Safety Considerations for Self and Others  Outcome: Ongoing, Progressing  Flowsheets (Taken 1/11/2021 0900 by Jovana Quintanilla, RN)  Safety Measures:  • suicide assessment completed  • safety rounds completed  Goal: Optimized Coping Skills in Response to Life Stressors  Outcome: Ongoing, Progressing  Intervention: Promote Effective Coping Strategies  Flowsheets (Taken 1/11/2021 0900 by Jovana Quintanilla, RN)  Supportive Measures: active listening utilized  Goal: Develops/Participates in Therapeutic [place] to Support Successful Transition  Outcome: Ongoing, Progressing  Intervention: Foster Therapeutic [place]  Flowsheets (Taken 1/11/2021 0900 by Jovana Quintanilla, RN)  Trust Relationship/Rapport: care explained  Intervention: Mutually Develop Transition  Plan  Flowsheets  Taken 1/11/2021 1524  Outpatient/Agency/Support Group Needs:  • residential services  • support group(s) (specify)  Transition Support:  • community resources reviewed  • crisis management plan verbalized  • crisis management plan promoted  • follow-up care coordinated  • follow-up care discussed  Anticipated Discharge Disposition: (sober living)  • residential substance use unit  • other (see comments)  Taken 1/11/2021 1521  Discharge Coordination/Progress: Patient has insurance for discharge planning and plans to return to [facility], but will not sign consent for referral.  Concerns Comments: No comment  Transportation Anticipated: family or friend will provide  Transportation Concerns: car, none  Current Discharge Risk:  • psychiatric illness  • substance use/abuse  Concerns to be Addressed:  • suicidal  • mental health  • substance/tobacco abuse/use  • coping/stress  • compliance issue  Readmission Within the Last 30 Days: no previous admission in last 30 days  Patient/Family Anticipated Services at Transition: none  Patient's Choice of Community Agency(s): [facility]  Patient/Family Anticipates Transition to: inpatient rehabilitation facility  Offered/Gave Vendor List: no   Goal Outcome Evaluation:  Plan of Care Reviewed With: patient  Progress: no change  Outcome Summary: New admit. Completed social history and integrated summary

## 2021-01-11 NOTE — PLAN OF CARE
Goal Outcome Evaluation:  Plan of Care Reviewed With: patient  Progress: no change   PT RATES ANXIETY 8/10. DENIES ANY DEPRESSION, SI, HI. PT DOES REPORTS RACING THOUGHTS. ISOLATES TO HER ROOM THE MAJORITY OF THE SHIFT.

## 2021-01-11 NOTE — PLAN OF CARE
Goal Outcome Evaluation:  Plan of Care Reviewed With: patient  Progress: no change(Patient new admit to unit)  Outcome Summary: New admit from [facility]. Denies S.I., H.I., but stated she had overtaken one of her meds to try and get manicky. Cooperative with admission procedures.

## 2021-01-12 VITALS
BODY MASS INDEX: 41.59 KG/M2 | TEMPERATURE: 97.5 F | WEIGHT: 258.8 LBS | OXYGEN SATURATION: 98 % | DIASTOLIC BLOOD PRESSURE: 87 MMHG | HEIGHT: 66 IN | RESPIRATION RATE: 18 BRPM | HEART RATE: 74 BPM | SYSTOLIC BLOOD PRESSURE: 131 MMHG

## 2021-01-12 LAB
LITHIUM SERPL-SCNC: 0.4 MMOL/L (ref 0.6–1.2)
TSH SERPL DL<=0.05 MIU/L-ACNC: 1.66 UIU/ML (ref 0.27–4.2)

## 2021-01-12 PROCEDURE — 84443 ASSAY THYROID STIM HORMONE: CPT | Performed by: PSYCHIATRY & NEUROLOGY

## 2021-01-12 PROCEDURE — 80178 ASSAY OF LITHIUM: CPT | Performed by: PSYCHIATRY & NEUROLOGY

## 2021-01-12 PROCEDURE — 99238 HOSP IP/OBS DSCHRG MGMT 30/<: CPT | Performed by: PSYCHIATRY & NEUROLOGY

## 2021-01-12 RX ADMIN — Medication 1 PATCH: at 08:26

## 2021-01-12 RX ADMIN — ASENAPINE MALEATE 10 MG: 5 TABLET SUBLINGUAL at 08:26

## 2021-01-12 RX ADMIN — LITHIUM CARBONATE 300 MG: 300 CAPSULE, GELATIN COATED ORAL at 10:00

## 2021-01-12 RX ADMIN — SERTRALINE 25 MG: 50 TABLET, FILM COATED ORAL at 08:26

## 2021-01-12 RX ADMIN — LAMOTRIGINE 150 MG: 100 TABLET ORAL at 08:26

## 2021-01-12 NOTE — PROGRESS NOTES
0910:     Therapist met individually with patient this morning. She denies SI/HI and acute symptoms. Patient reports that she would like to return to [facility] today. She reports that she just plans to be honest if asked by [facility] about her lab results. We spoke with her emergency contact and friend Santa who is supportive of patient.  No concerns identified with her returning home.  Patient reports that she knows better then to stop her medication and plans to comply with medication and outpatient appointments.  Patient has been calm and cooperative on the unit with no major issues identified. She continues to refuse to allow therapist to speak with staff at [facility].  Patient is scheduled with [facility].  We attempted to reach her therapist but was not successful.  Patient lists music and talking to her support system as healthy coping skills.     Assisted patient in identifying risk factors which would indicate the need for higher level of care including thoughts to harm self or others and/or self-harming behavior and encouraged patient to contact this office, call 911, or present to the nearest emergency room should any of these events occur. Discussed crisis intervention services and means to access.  Patient adamantly and convincingly denies current suicidal or homicidal ideation or perceptual disturbance.

## 2021-01-12 NOTE — DISCHARGE SUMMARY
":  1980  MRN:  [id number]  Visit Number:  [id number]      Date of Admission:1/10/2021   Date of Discharge:  2021    Discharge Diagnosis:  Principal Problem:    Bipolar I disorder with mixed features (CMS/HCC)  Active Problems:    History of alcohol dependence (CMS/HCC)    Essential hypertension    Suicidal ideations        Admission Diagnosis:  Suicidal ideations [R45.851]     HPI  Nicole Altman is a 40-year-old female who was admitted as a direct admit from [facility].  She came in with suicidal ideations.  For further details please refer to the history and physical done on 2021.      Hospital Course  Patient is a 40 y.o. female presented with a mixed presentation of her bipolar disorder and an attempt to take more Zoloft so that she could feel more manic and she also reported having suicidal ideations.  She was admitted to the treatment center at any 1 unit as a direct admit from [facility].  The patient was continued on her medications.  Patient denied any active suicidal ideation when she came in and she continued to deny suicidal thoughts while she was in the hospital.  She reported feeling better the next day and denied any depression anxiety.  Sleep and appetite were good.  She wanted to be discharged home and as she was doing better and was in stable mood she was discharged to continue her outpatient treatment..      Mental Status Exam upon discharge:   Mood \" good\"   Affect-congruent, appropriate, stable  Thought Content-goal directed, no delusional material present  Thought process-linear, organized.  Suicidality: No SI  Homicidality: No HI  Perception: No AH/VH    Procedures Performed         Consults:   Consults     No orders found from 2020 to 2021.          Pertinent Test Results: Glucose 105, UA shows small amount of blood, 3-5 RBC, 0-2 WBC, trace bacteria, 3-6 sq epi cells. Blood alcohol level 18 mg/dL. UDS is negative.,     Condition on Discharge:  " improved    Vital Signs  Temp:  [97.5 °F (36.4 °C)-97.9 °F (36.6 °C)] 97.5 °F (36.4 °C)  Heart Rate:  [74-77] 74  Resp:  [18] 18  BP: (131-133)/(78-87) 131/87      Discharge Disposition:  Home or Self Care    Discharge Medications:     Discharge Medications      Continue These Medications      Instructions Start Date   albuterol sulfate  (90 Base) MCG/ACT inhaler  Commonly known as: PROVENTIL HFA;VENTOLIN HFA;PROAIR HFA   2 puffs, Inhalation, Every 4 Hours PRN      asenapine maleate 5 MG sublingual tablet sublingual tablet  Commonly known as: SAPHRIS   10 mg, Sublingual, 2 Times Daily      irbesartan 300 MG tablet  Commonly known as: AVAPRO   TAKE 1 TABLET BY MOUTH ONE TIME A DAY AT NIGHT      lamoTRIgine 150 MG tablet  Commonly known as: LaMICtal   150 mg, Oral, 2 Times Daily      lithium carbonate 300 MG capsule   300 mg, Oral, 2 times daily      metoprolol succinate  MG 24 hr tablet  Commonly known as: TOPROL-XL   100 mg, Oral, Nightly      ondansetron ODT 8 MG disintegrating tablet  Commonly known as: Zofran ODT   Dissolve 1/2 to 1 tablet SL every 8 hours as needed for nausea.      pantoprazole 40 MG EC tablet  Commonly known as: PROTONIX   40 mg, Oral, Nightly      sertraline 25 MG tablet  Commonly known as: Zoloft   25 mg, Oral, Daily      SUMAtriptan 50 MG tablet  Commonly known as: IMITREX   Take one tablet at onset of headache. May repeat dose one time in 2 hours if headache not relieved.             Discharge Diet:   Diet Instructions     AS TOLERATED                 Activity at Discharge:   Activity Instructions     AS TOLERATED                 Follow-up Appointments  Future Appointments   Date Time Provider Department Center   1/19/2021  9:15 AM Amy Connelly PA [facility]         Test Results Pending at Discharge      Time spent in discharge: < 30 MIN    Clinician:   Divine Aguilar MD  01/12/21  10:18 EST

## 2021-01-14 ENCOUNTER — TELEMEDICINE (OUTPATIENT)
Dept: INTERNAL MEDICINE | Facility: CLINIC | Age: 41
End: 2021-01-14

## 2021-01-14 DIAGNOSIS — F32.81 PMDD (PREMENSTRUAL DYSPHORIC DISORDER): ICD-10-CM

## 2021-01-14 DIAGNOSIS — F31.9 BIPOLAR I DISORDER WITH MIXED FEATURES (HCC): Primary | ICD-10-CM

## 2021-01-14 PROCEDURE — 1111F DSCHRG MED/CURRENT MED MERGE: CPT | Performed by: PHYSICIAN ASSISTANT

## 2021-01-14 PROCEDURE — 99495 TRANSJ CARE MGMT MOD F2F 14D: CPT | Performed by: PHYSICIAN ASSISTANT

## 2021-01-14 RX ORDER — SERTRALINE HYDROCHLORIDE 25 MG/1
TABLET, FILM COATED ORAL
Qty: 14 TABLET | Refills: 3 | Status: SHIPPED | OUTPATIENT
Start: 2021-01-14 | End: 2021-05-13 | Stop reason: SDUPTHER

## 2021-01-14 RX ORDER — BUSPIRONE HYDROCHLORIDE 10 MG/1
10 TABLET ORAL 2 TIMES DAILY
Qty: 60 TABLET | Refills: 5 | Status: SHIPPED | OUTPATIENT
Start: 2021-01-14 | End: 2021-01-21 | Stop reason: SDUPTHER

## 2021-01-14 RX ORDER — ASENAPINE MALEATE 10 MG/1
10 TABLET SUBLINGUAL 2 TIMES DAILY
Qty: 60 TABLET | Refills: 5 | Status: SHIPPED | OUTPATIENT
Start: 2021-01-14 | End: 2021-01-29 | Stop reason: SDUPTHER

## 2021-01-14 NOTE — PROGRESS NOTES
You have chosen to receive care through a telehealth visit.  Do you consent to use a video/audio connection for your medical care today? Yes  Active parties: Amy Connelly PA-C, Mervat Akbar CMA, Nicole Altman

## 2021-01-14 NOTE — PROGRESS NOTES
"You have chosen to receive care through a telehealth visit.  Do you consent to use a video/audio connection for your medical care today? Yes  Active parties: Amy Connelly PA-C, Mervat Akbar CMA, Nicole Altman    Transitional Care Follow Up Visit  Subjective     Nicole Altman is a 40 y.o. female who presents for a transitional care management visit.    Transitional care follow-up visit was completed within 48 business hours after discharge, and therefore our office did not contact her via telephone to coordinate her care and needs.      I reviewed and discussed the details of that call along with the discharge summary, hospital problems, inpatient lab results, inpatient diagnostic studies, and consultation reports with Nicole.     Current outpatient and discharge medications have been reconciled for the patient.  Reviewed by: PATRICIA Mark      No flowsheet data found.  Risk for Readmission (LACE) Score: 8 (1/12/2021  6:00 AM)      History of Present Illness   Course During Hospital Stay: She presented to [facility] ED 1/10/2021 after becoming infatuated with the idea of taking excess doses of Zoloft to induce Ramya.  The following is taken directly from the hospital discharge summary: \"Nicole Altman is a 40 y.o. female who was admitted on 1/10/2021 with complaints of suicidal ideations which were vague but she did admit to taking extra antidepressant medications to induce a manic episode. The patient has a diagnosis of bipolar disorder and has acted in manners dangerous to self and others in the past during manic episodes. She reports episode lasting 3-4 days where she is up a lot, has racing thoughts, pressured speech, more goal directed activity, feelings invincible, impulsive behaviors with no concern about the consequences and depressive episodes afterwards. She reported she received an extra script of Zoloft and she was taking extra pills. She was vague about suicidal ideations but at one " "point admitted to wanting to die.\"    Since discharge she reports that she has been taking all medications as prescribed without any missed or excess doses. She reportedly is feeling back to baseline but is concerned with likelihood of further infatuations with using Zoloft to induce carlos manuel. She has had significant benefit from Zoloft in treatment of severe PMDD with suicidal ideation. She has been praying for the obsession to be removed but remains concerned. She has been keeping busy and surrounding herself with her support system which has helped. Depression is stable but she remains somewhat anxious as always. No current SI or HI.        The following portions of the patient's history were reviewed and updated as appropriate: allergies, current medications, past family history, past medical history, past social history, past surgical history and problem list.    Review of Systems   Constitutional: Negative for appetite change, chills, fatigue, fever and unexpected weight change.   HENT: Negative for congestion, ear pain, hearing loss, nosebleeds, postnasal drip, rhinorrhea, sore throat, tinnitus and trouble swallowing.    Eyes: Negative for photophobia, discharge and visual disturbance.   Respiratory: Negative for cough, chest tightness, shortness of breath and wheezing.    Cardiovascular: Negative for chest pain, palpitations and leg swelling.   Gastrointestinal: Negative for abdominal distention, abdominal pain, blood in stool, constipation, diarrhea, nausea and vomiting.   Endocrine: Negative for cold intolerance, heat intolerance, polydipsia, polyphagia and polyuria.   Genitourinary: Negative.    Musculoskeletal: Negative for arthralgias, back pain, joint swelling, myalgias, neck pain and neck stiffness.   Skin: Negative for color change, pallor, rash and wound.   Allergic/Immunologic: Negative for environmental allergies, food allergies and immunocompromised state.   Neurological: Negative for dizziness, " tremors, seizures, weakness, numbness and headaches.   Hematological: Negative for adenopathy. Does not bruise/bleed easily.   Psychiatric/Behavioral: Positive for agitation, decreased concentration and dysphoric mood (stable). Negative for behavioral problems, confusion, hallucinations, self-injury and suicidal ideas. The patient is nervous/anxious. The patient is not hyperactive.        Objective   Physical Exam  Nursing note reviewed.   Constitutional:       General: She is not in acute distress.     Appearance: She is well-developed. She is obese. She is not ill-appearing, toxic-appearing or diaphoretic.   HENT:      Head: Normocephalic and atraumatic.   Eyes:      General: No scleral icterus.     Extraocular Movements: Extraocular movements intact.   Neck:      Comments: Neck appears normal  Pulmonary:      Effort: Pulmonary effort is normal. No respiratory distress.   Skin:     Findings: No erythema or rash.   Neurological:      Mental Status: She is alert and oriented to person, place, and time.      Coordination: Coordination normal.   Psychiatric:         Attention and Perception: Attention and perception normal.         Mood and Affect: Mood is anxious (mild). Mood is not depressed or elated. Affect is not labile, blunt, angry, tearful or inappropriate.         Speech: Speech normal.         Behavior: Behavior normal. Behavior is not agitated, aggressive or hyperactive. Behavior is cooperative.         Thought Content: Thought content normal. Thought content is not paranoid or delusional. Thought content does not include homicidal or suicidal ideation. Thought content does not include homicidal or suicidal plan.         Cognition and Memory: Cognition and memory normal.         Judgment: Judgment normal. Judgment is not impulsive or inappropriate.         Assessment/Plan   Diagnoses and all orders for this visit:    1. Bipolar I disorder with mixed features (CMS/[facility]) (Primary)  -     asenapine maleate  (Saphris) 10 MG sublingual tablet sublingual tablet; Place 1 tablet under the tongue 2 (Two) Times a Day.  Dispense: 60 tablet; Refill: 5  -     busPIRone (BUSPAR) 10 MG tablet; Take 1 tablet by mouth 2 (two) times a day.  Dispense: 60 tablet; Refill: 5    2. PMDD (premenstrual dysphoric disorder)  -     sertraline (Zoloft) 25 MG tablet; Take 1 tablet by mouth daily for 14 days beginning on the 14th of each month.  Indications: Bipolar depression  Dispense: 14 tablet; Refill: 3      Will resume Zoloft 14 days per month beginning on the 14th of each month as opposed to taking it every day to minimize chance of inducing carlos manuel. She is in favor of this plan. Will increase Saphris dosing and also initiate Buspar twice daily to help with anxiety.     With PMDD related suicidal ideation being well controlled with Zoloft we may consider reducing Lithium dosing in 1 month.       Current outpatient and discharge medications have been reconciled for the patient.  Reviewed by: PATRICIA Mark    F/u in 1 month or sooner if needed.

## 2021-01-21 DIAGNOSIS — F31.9 BIPOLAR I DISORDER WITH MIXED FEATURES (HCC): ICD-10-CM

## 2021-01-21 RX ORDER — BUSPIRONE HYDROCHLORIDE 10 MG/1
10 TABLET ORAL 3 TIMES DAILY
Qty: 90 TABLET | Refills: 5 | Status: SHIPPED | OUTPATIENT
Start: 2021-01-21 | End: 2021-05-13 | Stop reason: SDUPTHER

## 2021-01-29 DIAGNOSIS — F31.9 BIPOLAR I DISORDER WITH MIXED FEATURES (HCC): ICD-10-CM

## 2021-01-29 RX ORDER — ASENAPINE MALEATE 10 MG/1
10 TABLET SUBLINGUAL 2 TIMES DAILY
Qty: 60 TABLET | Refills: 5 | Status: SHIPPED | OUTPATIENT
Start: 2021-01-29 | End: 2021-05-13 | Stop reason: SDUPTHER

## 2021-02-05 ENCOUNTER — TELEMEDICINE (OUTPATIENT)
Dept: INTERNAL MEDICINE | Facility: CLINIC | Age: 41
End: 2021-02-05

## 2021-02-05 DIAGNOSIS — F17.210 CIGARETTE SMOKER: ICD-10-CM

## 2021-02-05 DIAGNOSIS — F31.9 BIPOLAR I DISORDER WITH MIXED FEATURES (HCC): Primary | Chronic | ICD-10-CM

## 2021-02-05 DIAGNOSIS — F32.81 PREMENSTRUAL DYSPHORIC DISORDER: Chronic | ICD-10-CM

## 2021-02-05 PROBLEM — R10.32 LEFT LOWER QUADRANT PAIN: Status: RESOLVED | Noted: 2018-07-09 | Resolved: 2021-02-05

## 2021-02-05 PROBLEM — R19.7 DIARRHEA: Status: RESOLVED | Noted: 2018-07-09 | Resolved: 2021-02-05

## 2021-02-05 PROBLEM — F41.9 ANXIETY: Chronic | Status: ACTIVE | Noted: 2020-10-08

## 2021-02-05 PROBLEM — F84.0 AUTISM SPECTRUM DISORDER REQUIRING SUPPORT (LEVEL 1): Chronic | Status: ACTIVE | Noted: 2021-02-05

## 2021-02-05 PROBLEM — E66.01 MORBID (SEVERE) OBESITY DUE TO EXCESS CALORIES: Status: ACTIVE | Noted: 2021-02-05

## 2021-02-05 PROBLEM — Z91.51 HISTORY OF SUICIDE ATTEMPT: Status: ACTIVE | Noted: 2021-01-10

## 2021-02-05 PROBLEM — K62.5 BRIGHT RED BLOOD PER RECTUM: Status: RESOLVED | Noted: 2018-07-09 | Resolved: 2021-02-05

## 2021-02-05 PROCEDURE — 99214 OFFICE O/P EST MOD 30 MIN: CPT | Performed by: PHYSICIAN ASSISTANT

## 2021-02-05 PROCEDURE — 99406 BEHAV CHNG SMOKING 3-10 MIN: CPT | Performed by: PHYSICIAN ASSISTANT

## 2021-02-05 RX ORDER — LITHIUM CARBONATE 150 MG/1
CAPSULE ORAL
Qty: 21 CAPSULE | Refills: 0 | Status: SHIPPED | OUTPATIENT
Start: 2021-02-05 | End: 2021-04-13

## 2021-02-05 RX ORDER — VARENICLINE TARTRATE 1 MG/1
1 TABLET, FILM COATED ORAL 2 TIMES DAILY
Qty: 56 TABLET | Refills: 4 | Status: SHIPPED | OUTPATIENT
Start: 2021-03-05 | End: 2021-03-08 | Stop reason: SDUPTHER

## 2021-02-05 NOTE — PROGRESS NOTES
You have chosen to receive care through a telehealth visit.  Do you consent to use a video/audio connection for your medical care today? Yes  Active parties: Amy Connelly PA-C, Mervat Akbar CMA, Nicole Altman    Chief Complaint  Nicotine Dependence and Follow-up (discuss lithium)    Subjective          Nicole Altman presents to [facility] for follow-up of bipolar disorder, up to some spectrum disorder, PMDD and nicotine dependence.  History of Present Illness  Patient presents today via video visit for follow-up of bipolar disorder and premenstrual dysphoric disorder (PMDD).  For the last couple of months she has been taking Zoloft 25 mg daily for 14 days beginning on the 14th of each month which has significantly improved dysphoric mood and resolved suicidal ideation related to PMDD.  Due to resolution of suicidal ideation we previously did a trial of decreasing lithium from a total of 900 mg daily to 600 mg daily which she tolerated well without any episodes of suicidal ideation.  She would ultimately like to discontinue lithium.  Of note, her lithium level historically has always been subtherapeutic as she was unable to tolerate therapeutic doses of lithium.  She has continued to take Lamictal and Saphris as directed.  She denies any bothersome dysphoric mood, suicidal ideation or homicidal ideation.  She reports that anxiety is now also very well controlled with the recent addition of BuSpar 10 mg 3 times daily.    She is a current every day smoker and has failed multiple attempts to quit smoking in the past.  She did have a successful attempt at quitting smoking with the use of Chantix in the past, however later resumed smoking.  She has had no success with nicotine patches in the past.  She is unable to use nicotine gum to dental problems but is able to use nicotine lozenges which are somewhat helpful.  She has plenty of nicotine lozenges at home but would like to try using  Chantix again as she is very motivated quit smoking as it was a rare department to be a non-smoker to be considered for an autism service dog.        Objective   Vital Signs:   There were no vitals taken for this visit.    Physical Exam  Nursing note reviewed.   Constitutional:       General: She is not in acute distress.     Appearance: She is well-developed. She is obese. She is not ill-appearing, toxic-appearing or diaphoretic.   HENT:      Head: Normocephalic and atraumatic.   Eyes:      General: No scleral icterus.        Right eye: No discharge.         Left eye: No discharge.      Comments: Makes good eye contact throughout video visit, much improved compared to prior encounters.    Neck:      Musculoskeletal: No neck rigidity.      Comments: Neck appears normal  Pulmonary:      Effort: Pulmonary effort is normal. No respiratory distress.   Skin:     Coloration: Skin is not pale.      Findings: No erythema or rash.   Neurological:      Mental Status: She is alert and oriented to person, place, and time.      Coordination: Coordination normal.   Psychiatric:         Mood and Affect: Mood normal.         Thought Content: Thought content normal.         Judgment: Judgment normal.               Assessment and Plan    Problem List Items Addressed This Visit        Genitourinary and Reproductive     Premenstrual dysphoric disorder (Chronic)    Overview     Prozac during luteal phase helped some but made her feel angry and frantic inside. Symptoms well controlled with Zoloft during luteal phase without undesirable side effects.     Continue Zoloft 25 mg daily for 14 days beginning on the 14th of each month.         Relevant Medications    lithium carbonate 150 MG capsule (Reduce to 150 mg twice daily x7 days, then further reduced to 150 mg once a day for 7 days then discontinue)       Mental Health    Bipolar I disorder with mixed features (CMS/HCC) - Primary (Chronic)    Relevant Medications    lithium carbonate  150 MG capsule    Reduce to 150 mg twice daily x7 days, then further reduced to 150 mg once a day for 7 days then discontinue.    Continue Saphris and Lamictal.       Tobacco    Cigarette smoker    Relevant Medications    varenicline (CHANTIX RAJESH) 0.5 MG X 11 & 1 MG X 42 tablet   varenicline (CHANTIX) 1 MG tablet (Start on 3/5/2021)   Nicole lAtman  reports that she has been smoking cigarettes. She started smoking about 24 years ago. She has been smoking about 1.00 pack per day. She has never used smokeless tobacco.. I have educated her on the risk of diseases from using tobacco products such as cancer, COPD and heart disease.     I advised her to quit and she is willing to quit. We have discussed the following method/s for tobacco cessation:  Education Material Counseling OTC Cessation Products Prescription Medicaiton.  Together we have set a quit date for 1 month from today.  She will follow up with me in a few months or sooner to check on her progress.    I spent 5.5 minutes counseling the patient.            I spent 30 minutes caring for Nicole on this date of service. This time includes time spent by me in the following activities:preparing for the visit, obtaining and/or reviewing a separately obtained history, performing a medically appropriate examination and/or evaluation , counseling and educating the patient/family/caregiver, ordering medications, tests, or procedures and documenting information in the medical record  Follow Up   No follow-ups on file.  Patient was given instructions and counseling regarding her condition or for health maintenance advice. Please see specific information pulled into the AVS if appropriate.

## 2021-02-15 RX ORDER — IRBESARTAN 300 MG/1
TABLET ORAL
Qty: 90 TABLET | Refills: 1 | Status: SHIPPED | OUTPATIENT
Start: 2021-02-15 | End: 2021-08-06

## 2021-02-26 DIAGNOSIS — E66.01 MORBID (SEVERE) OBESITY DUE TO EXCESS CALORIES (HCC): ICD-10-CM

## 2021-02-26 DIAGNOSIS — G25.9 MOVEMENT DISORDER: ICD-10-CM

## 2021-02-26 DIAGNOSIS — E16.2 LOW BLOOD SUGAR: Primary | ICD-10-CM

## 2021-02-26 DIAGNOSIS — Z11.59 NEED FOR HEPATITIS C SCREENING TEST: ICD-10-CM

## 2021-02-27 LAB
ALBUMIN SERPL-MCNC: 4 G/DL (ref 3.5–5.2)
ALBUMIN/GLOB SERPL: 1.4 G/DL
ALP SERPL-CCNC: 102 U/L (ref 39–117)
ALT SERPL-CCNC: 9 U/L (ref 1–33)
AST SERPL-CCNC: 15 U/L (ref 1–32)
BILIRUB SERPL-MCNC: 0.2 MG/DL (ref 0–1.2)
BUN SERPL-MCNC: 17 MG/DL (ref 6–20)
BUN/CREAT SERPL: 17.9 (ref 7–25)
CALCIUM SERPL-MCNC: 9.9 MG/DL (ref 8.6–10.5)
CHLORIDE SERPL-SCNC: 103 MMOL/L (ref 98–107)
CO2 SERPL-SCNC: 26 MMOL/L (ref 22–29)
CREAT SERPL-MCNC: 0.95 MG/DL (ref 0.57–1)
GLOBULIN SER CALC-MCNC: 2.8 GM/DL
GLUCOSE SERPL-MCNC: 108 MG/DL (ref 65–99)
HBA1C MFR BLD: 5.9 % (ref 4.8–5.6)
HCV AB S/CO SERPL IA: <0.1 S/CO RATIO (ref 0–0.9)
POTASSIUM SERPL-SCNC: 4.3 MMOL/L (ref 3.5–5.2)
PROT SERPL-MCNC: 6.8 G/DL (ref 6–8.5)
SODIUM SERPL-SCNC: 139 MMOL/L (ref 136–145)
TSH SERPL DL<=0.005 MIU/L-ACNC: 2.29 UIU/ML (ref 0.27–4.2)

## 2021-03-04 DIAGNOSIS — F31.60 BIPOLAR DISORDER, MIXED (HCC): ICD-10-CM

## 2021-03-04 DIAGNOSIS — I10 ESSENTIAL (PRIMARY) HYPERTENSION: ICD-10-CM

## 2021-03-04 DIAGNOSIS — Z15.89 MTHFR GENE MUTATION: ICD-10-CM

## 2021-03-04 DIAGNOSIS — E66.01 MORBID (SEVERE) OBESITY DUE TO EXCESS CALORIES (HCC): Primary | ICD-10-CM

## 2021-03-04 DIAGNOSIS — E53.8 B12 DEFICIENCY: ICD-10-CM

## 2021-03-04 DIAGNOSIS — E53.8 FOLATE DEFICIENCY: ICD-10-CM

## 2021-03-04 DIAGNOSIS — F32.81 PMDD (PREMENSTRUAL DYSPHORIC DISORDER): ICD-10-CM

## 2021-03-04 LAB
ESTRADIOL SERPL-MCNC: 54.5 PG/ML
FOLATE SERPL-MCNC: 3.1 NG/ML
FSH SERPL-ACNC: 4 MIU/ML
MTHFR GENE MUT ANL BLD/T: NORMAL
TESTOST FREE SERPL-MCNC: 2.5 PG/ML (ref 0–4.2)
TESTOST SERPL-MCNC: 18 NG/DL (ref 8–48)

## 2021-03-07 DIAGNOSIS — F17.210 CIGARETTE SMOKER: ICD-10-CM

## 2021-03-07 DIAGNOSIS — F31.9 BIPOLAR I DISORDER WITH MIXED FEATURES (HCC): Chronic | ICD-10-CM

## 2021-03-08 DIAGNOSIS — F17.210 CIGARETTE SMOKER: ICD-10-CM

## 2021-03-08 RX ORDER — METOPROLOL SUCCINATE 100 MG/1
100 TABLET, EXTENDED RELEASE ORAL NIGHTLY
Qty: 90 TABLET | Refills: 1 | Status: SHIPPED | OUTPATIENT
Start: 2021-03-08 | End: 2021-09-07

## 2021-03-08 RX ORDER — LITHIUM CARBONATE 150 MG/1
CAPSULE ORAL
Qty: 21 CAPSULE | Refills: 0 | OUTPATIENT
Start: 2021-03-08

## 2021-03-08 RX ORDER — LAMOTRIGINE 150 MG/1
TABLET ORAL
Qty: 60 TABLET | Refills: 2 | Status: SHIPPED | OUTPATIENT
Start: 2021-03-08 | End: 2021-03-15

## 2021-03-08 RX ORDER — ALBUTEROL SULFATE 90 UG/1
2 AEROSOL, METERED RESPIRATORY (INHALATION) EVERY 4 HOURS PRN
Qty: 36 G | Refills: 5 | Status: SHIPPED | OUTPATIENT
Start: 2021-03-08 | End: 2022-05-24 | Stop reason: SDUPTHER

## 2021-03-08 RX ORDER — VARENICLINE TARTRATE 1 MG/1
1 TABLET, FILM COATED ORAL 2 TIMES DAILY
Qty: 60 TABLET | Refills: 4 | Status: SHIPPED | OUTPATIENT
Start: 2021-03-08 | End: 2021-04-06 | Stop reason: SDUPTHER

## 2021-03-09 DIAGNOSIS — E53.8 LOW FOLATE: Primary | ICD-10-CM

## 2021-03-09 LAB — HCYS SERPL-SCNC: 6.7 UMOL/L (ref 0–14.5)

## 2021-03-09 RX ORDER — LEVOMEFOLATE CALCIUM 7.5 MG
7.5 TABLET ORAL DAILY
Qty: 90 TABLET | Refills: 3 | Status: SHIPPED | OUTPATIENT
Start: 2021-03-09 | End: 2021-09-16

## 2021-03-11 DIAGNOSIS — E53.8 B12 DEFICIENCY: Primary | ICD-10-CM

## 2021-03-27 ENCOUNTER — APPOINTMENT (OUTPATIENT)
Dept: GENERAL RADIOLOGY | Facility: HOSPITAL | Age: 41
End: 2021-03-27

## 2021-03-27 ENCOUNTER — APPOINTMENT (OUTPATIENT)
Dept: CT IMAGING | Facility: HOSPITAL | Age: 41
End: 2021-03-27

## 2021-03-27 ENCOUNTER — HOSPITAL ENCOUNTER (EMERGENCY)
Facility: HOSPITAL | Age: 41
Discharge: HOME | End: 2021-03-27
Attending: EMERGENCY MEDICINE | Admitting: EMERGENCY MEDICINE

## 2021-03-27 VITALS
OXYGEN SATURATION: 99 % | RESPIRATION RATE: 18 BRPM | BODY MASS INDEX: 43.39 KG/M2 | HEIGHT: 66 IN | TEMPERATURE: 99.5 F | HEART RATE: 89 BPM | WEIGHT: 270 LBS | DIASTOLIC BLOOD PRESSURE: 79 MMHG | SYSTOLIC BLOOD PRESSURE: 118 MMHG

## 2021-03-27 DIAGNOSIS — A08.11 GASTROENTERITIS DUE TO NOROVIRUS: Primary | ICD-10-CM

## 2021-03-27 LAB
ADV 40+41 DNA STL QL NAA+NON-PROBE: NOT DETECTED
ALBUMIN SERPL-MCNC: 4.3 G/DL (ref 3.5–5.2)
ALBUMIN/GLOB SERPL: 1.2 G/DL
ALP SERPL-CCNC: 115 U/L (ref 39–117)
ALT SERPL W P-5'-P-CCNC: 10 U/L (ref 1–33)
ANION GAP SERPL CALCULATED.3IONS-SCNC: 17.6 MMOL/L (ref 5–15)
AST SERPL-CCNC: 16 U/L (ref 1–32)
ASTRO TYP 1-8 RNA STL QL NAA+NON-PROBE: NOT DETECTED
BACTERIA UR QL AUTO: ABNORMAL /HPF
BASOPHILS # BLD AUTO: 0.03 10*3/MM3 (ref 0–0.2)
BASOPHILS NFR BLD AUTO: 0.3 % (ref 0–1.5)
BILIRUB SERPL-MCNC: 0.4 MG/DL (ref 0–1.2)
BILIRUB UR QL STRIP: NEGATIVE
BUN SERPL-MCNC: 18 MG/DL (ref 6–20)
BUN/CREAT SERPL: 15 (ref 7–25)
C CAYETANENSIS DNA STL QL NAA+NON-PROBE: NOT DETECTED
C COLI+JEJ+UPSA DNA STL QL NAA+NON-PROBE: NOT DETECTED
C DIFF GDH STL QL: NEGATIVE
CALCIUM SPEC-SCNC: 9.5 MG/DL (ref 8.6–10.5)
CHLORIDE SERPL-SCNC: 100 MMOL/L (ref 98–107)
CLARITY UR: ABNORMAL
CO2 SERPL-SCNC: 18.4 MMOL/L (ref 22–29)
COLOR UR: YELLOW
CREAT SERPL-MCNC: 1.2 MG/DL (ref 0.57–1)
CRYPTOSP DNA STL QL NAA+NON-PROBE: NOT DETECTED
D-LACTATE SERPL-SCNC: 2.2 MMOL/L (ref 0.5–2)
D-LACTATE SERPL-SCNC: 3.9 MMOL/L (ref 0.5–2)
DEPRECATED RDW RBC AUTO: 40.3 FL (ref 37–54)
E HISTOLYT DNA STL QL NAA+NON-PROBE: NOT DETECTED
EAEC PAA PLAS AGGR+AATA ST NAA+NON-PRB: NOT DETECTED
EC STX1+STX2 GENES STL QL NAA+NON-PROBE: NOT DETECTED
EOSINOPHIL # BLD AUTO: 0.06 10*3/MM3 (ref 0–0.4)
EOSINOPHIL NFR BLD AUTO: 0.5 % (ref 0.3–6.2)
EPEC EAE GENE STL QL NAA+NON-PROBE: NOT DETECTED
ERYTHROCYTE [DISTWIDTH] IN BLOOD BY AUTOMATED COUNT: 14 % (ref 12.3–15.4)
ETEC LTA+ST1A+ST1B TOX ST NAA+NON-PROBE: NOT DETECTED
G LAMBLIA DNA STL QL NAA+NON-PROBE: NOT DETECTED
GFR SERPL CREATININE-BSD FRML MDRD: 49 ML/MIN/1.73
GLOBULIN UR ELPH-MCNC: 3.5 GM/DL
GLUCOSE SERPL-MCNC: 214 MG/DL (ref 65–99)
GLUCOSE UR STRIP-MCNC: NEGATIVE MG/DL
HCG SERPL QL: NEGATIVE
HCT VFR BLD AUTO: 42.2 % (ref 34–46.6)
HGB BLD-MCNC: 13.5 G/DL (ref 12–15.9)
HGB UR QL STRIP.AUTO: ABNORMAL
HOLD SPECIMEN: NORMAL
HYALINE CASTS UR QL AUTO: ABNORMAL /LPF
IMM GRANULOCYTES # BLD AUTO: 0.06 10*3/MM3 (ref 0–0.05)
IMM GRANULOCYTES NFR BLD AUTO: 0.5 % (ref 0–0.5)
KETONES UR QL STRIP: ABNORMAL
LEUKOCYTE ESTERASE UR QL STRIP.AUTO: NEGATIVE
LIPASE SERPL-CCNC: 18 U/L (ref 13–60)
LYMPHOCYTES # BLD AUTO: 0.36 10*3/MM3 (ref 0.7–3.1)
LYMPHOCYTES NFR BLD AUTO: 3.2 % (ref 19.6–45.3)
MCH RBC QN AUTO: 25.4 PG (ref 26.6–33)
MCHC RBC AUTO-ENTMCNC: 32 G/DL (ref 31.5–35.7)
MCV RBC AUTO: 79.5 FL (ref 79–97)
MONOCYTES # BLD AUTO: 0.37 10*3/MM3 (ref 0.1–0.9)
MONOCYTES NFR BLD AUTO: 3.3 % (ref 5–12)
MUCOUS THREADS URNS QL MICRO: ABNORMAL /HPF
NEUTROPHILS NFR BLD AUTO: 10.33 10*3/MM3 (ref 1.7–7)
NEUTROPHILS NFR BLD AUTO: 92.2 % (ref 42.7–76)
NITRITE UR QL STRIP: NEGATIVE
NOROVIRUS GI+II RNA STL QL NAA+NON-PROBE: DETECTED
NRBC BLD AUTO-RTO: 0 /100 WBC (ref 0–0.2)
P SHIGELLOIDES DNA STL QL NAA+NON-PROBE: NOT DETECTED
PH UR STRIP.AUTO: 5.5 [PH] (ref 5–8)
PLATELET # BLD AUTO: 322 10*3/MM3 (ref 140–450)
PMV BLD AUTO: 10.4 FL (ref 6–12)
POTASSIUM SERPL-SCNC: 4.3 MMOL/L (ref 3.5–5.2)
PROCALCITONIN SERPL-MCNC: 0.39 NG/ML (ref 0–0.25)
PROT SERPL-MCNC: 7.8 G/DL (ref 6–8.5)
PROT UR QL STRIP: ABNORMAL
RBC # BLD AUTO: 5.31 10*6/MM3 (ref 3.77–5.28)
RBC # UR: ABNORMAL /HPF
REF LAB TEST METHOD: ABNORMAL
RVA RNA STL QL NAA+NON-PROBE: NOT DETECTED
S ENT+BONG DNA STL QL NAA+NON-PROBE: NOT DETECTED
SAPO I+II+IV+V RNA STL QL NAA+NON-PROBE: NOT DETECTED
SARS-COV-2 RNA PNL SPEC NAA+PROBE: NOT DETECTED
SHIGELLA SP+EIEC IPAH ST NAA+NON-PROBE: NOT DETECTED
SODIUM SERPL-SCNC: 136 MMOL/L (ref 136–145)
SP GR UR STRIP: >=1.03 (ref 1–1.03)
SQUAMOUS #/AREA URNS HPF: ABNORMAL /HPF
UROBILINOGEN UR QL STRIP: ABNORMAL
V CHOL+PARA+VUL DNA STL QL NAA+NON-PROBE: NOT DETECTED
V CHOLERAE DNA STL QL NAA+NON-PROBE: NOT DETECTED
WBC # BLD AUTO: 11.21 10*3/MM3 (ref 3.4–10.8)
WBC UR QL AUTO: ABNORMAL /HPF
WHOLE BLOOD HOLD SPECIMEN: NORMAL
WHOLE BLOOD HOLD SPECIMEN: NORMAL
Y ENTEROCOL DNA STL QL NAA+NON-PROBE: NOT DETECTED

## 2021-03-27 PROCEDURE — 63710000001 PROMETHAZINE PER 12.5 MG: Performed by: PHYSICIAN ASSISTANT

## 2021-03-27 PROCEDURE — 87635 SARS-COV-2 COVID-19 AMP PRB: CPT | Performed by: EMERGENCY MEDICINE

## 2021-03-27 PROCEDURE — 84145 PROCALCITONIN (PCT): CPT | Performed by: PHYSICIAN ASSISTANT

## 2021-03-27 PROCEDURE — 85025 COMPLETE CBC W/AUTO DIFF WBC: CPT | Performed by: EMERGENCY MEDICINE

## 2021-03-27 PROCEDURE — 83605 ASSAY OF LACTIC ACID: CPT | Performed by: PHYSICIAN ASSISTANT

## 2021-03-27 PROCEDURE — 80053 COMPREHEN METABOLIC PANEL: CPT | Performed by: EMERGENCY MEDICINE

## 2021-03-27 PROCEDURE — 96374 THER/PROPH/DIAG INJ IV PUSH: CPT

## 2021-03-27 PROCEDURE — 99284 EMERGENCY DEPT VISIT MOD MDM: CPT

## 2021-03-27 PROCEDURE — 25010000002 IOPAMIDOL 61 % SOLUTION: Performed by: EMERGENCY MEDICINE

## 2021-03-27 PROCEDURE — 84703 CHORIONIC GONADOTROPIN ASSAY: CPT | Performed by: EMERGENCY MEDICINE

## 2021-03-27 PROCEDURE — 74177 CT ABD & PELVIS W/CONTRAST: CPT

## 2021-03-27 PROCEDURE — 87324 CLOSTRIDIUM AG IA: CPT | Performed by: PHYSICIAN ASSISTANT

## 2021-03-27 PROCEDURE — 96376 TX/PRO/DX INJ SAME DRUG ADON: CPT

## 2021-03-27 PROCEDURE — 25010000002 ONDANSETRON PER 1 MG: Performed by: PHYSICIAN ASSISTANT

## 2021-03-27 PROCEDURE — 71045 X-RAY EXAM CHEST 1 VIEW: CPT

## 2021-03-27 PROCEDURE — 83690 ASSAY OF LIPASE: CPT | Performed by: EMERGENCY MEDICINE

## 2021-03-27 PROCEDURE — 0097U HC BIOFIRE FILMARRAY GI PANEL: CPT | Performed by: PHYSICIAN ASSISTANT

## 2021-03-27 PROCEDURE — 87449 NOS EACH ORGANISM AG IA: CPT | Performed by: PHYSICIAN ASSISTANT

## 2021-03-27 PROCEDURE — 81001 URINALYSIS AUTO W/SCOPE: CPT | Performed by: EMERGENCY MEDICINE

## 2021-03-27 RX ORDER — IBUPROFEN 600 MG/1
600 TABLET ORAL ONCE
Status: COMPLETED | OUTPATIENT
Start: 2021-03-27 | End: 2021-03-27

## 2021-03-27 RX ORDER — LOPERAMIDE HYDROCHLORIDE 2 MG/1
2 CAPSULE ORAL 4 TIMES DAILY PRN
Qty: 12 CAPSULE | Refills: 0 | Status: SHIPPED | OUTPATIENT
Start: 2021-03-27

## 2021-03-27 RX ORDER — ONDANSETRON 2 MG/ML
4 INJECTION INTRAMUSCULAR; INTRAVENOUS ONCE
Status: COMPLETED | OUTPATIENT
Start: 2021-03-27 | End: 2021-03-27

## 2021-03-27 RX ORDER — PROMETHAZINE HYDROCHLORIDE 12.5 MG/1
12.5 TABLET ORAL ONCE
Status: COMPLETED | OUTPATIENT
Start: 2021-03-27 | End: 2021-03-27

## 2021-03-27 RX ORDER — SODIUM CHLORIDE 0.9 % (FLUSH) 0.9 %
10 SYRINGE (ML) INJECTION AS NEEDED
Status: DISCONTINUED | OUTPATIENT
Start: 2021-03-27 | End: 2021-03-27 | Stop reason: HOSPADM

## 2021-03-27 RX ORDER — ONDANSETRON 4 MG/1
4 TABLET, ORALLY DISINTEGRATING ORAL EVERY 6 HOURS PRN
Qty: 20 TABLET | Refills: 0 | Status: SHIPPED | OUTPATIENT
Start: 2021-03-27 | End: 2021-09-16 | Stop reason: SDUPTHER

## 2021-03-27 RX ORDER — ACETAMINOPHEN 500 MG
1000 TABLET ORAL ONCE
Status: COMPLETED | OUTPATIENT
Start: 2021-03-27 | End: 2021-03-27

## 2021-03-27 RX ADMIN — PROMETHAZINE HYDROCHLORIDE 12.5 MG: 12.5 TABLET ORAL at 14:44

## 2021-03-27 RX ADMIN — ONDANSETRON 4 MG: 2 INJECTION INTRAMUSCULAR; INTRAVENOUS at 12:57

## 2021-03-27 RX ADMIN — IBUPROFEN 600 MG: 600 TABLET, FILM COATED ORAL at 14:44

## 2021-03-27 RX ADMIN — SODIUM CHLORIDE 1000 ML: 9 INJECTION, SOLUTION INTRAVENOUS at 12:19

## 2021-03-27 RX ADMIN — ACETAMINOPHEN 1000 MG: 500 TABLET ORAL at 12:19

## 2021-03-27 RX ADMIN — SODIUM CHLORIDE 2000 ML: 9 INJECTION, SOLUTION INTRAVENOUS at 16:24

## 2021-03-27 RX ADMIN — ONDANSETRON 4 MG: 2 INJECTION INTRAMUSCULAR; INTRAVENOUS at 14:44

## 2021-03-27 RX ADMIN — IOPAMIDOL 100 ML: 612 INJECTION, SOLUTION INTRAVENOUS at 14:19

## 2021-03-29 ENCOUNTER — TELEMEDICINE (OUTPATIENT)
Dept: INTERNAL MEDICINE | Facility: CLINIC | Age: 41
End: 2021-03-29

## 2021-03-29 ENCOUNTER — EPISODE CHANGES (OUTPATIENT)
Dept: CASE MANAGEMENT | Facility: OTHER | Age: 41
End: 2021-03-29

## 2021-03-29 DIAGNOSIS — E27.8 ADRENAL NODULE (HCC): Primary | ICD-10-CM

## 2021-03-29 DIAGNOSIS — A08.11 GASTROENTERITIS DUE TO NOROVIRUS: ICD-10-CM

## 2021-03-29 PROCEDURE — 99213 OFFICE O/P EST LOW 20 MIN: CPT | Performed by: PHYSICIAN ASSISTANT

## 2021-03-30 VITALS — TEMPERATURE: 98.4 F | SYSTOLIC BLOOD PRESSURE: 134 MMHG | DIASTOLIC BLOOD PRESSURE: 86 MMHG | HEART RATE: 75 BPM

## 2021-04-01 PROBLEM — E27.9 ADRENAL NODULE: Status: ACTIVE | Noted: 2021-04-01

## 2021-04-01 PROBLEM — E27.8 ADRENAL NODULE (HCC): Status: ACTIVE | Noted: 2021-04-01

## 2021-04-06 ENCOUNTER — PRIOR AUTHORIZATION (OUTPATIENT)
Dept: INTERNAL MEDICINE | Facility: CLINIC | Age: 41
End: 2021-04-06

## 2021-04-06 DIAGNOSIS — F17.210 CIGARETTE SMOKER: ICD-10-CM

## 2021-04-06 RX ORDER — VARENICLINE TARTRATE 1 MG/1
1 TABLET, FILM COATED ORAL 2 TIMES DAILY
Qty: 60 TABLET | Refills: 4 | Status: SHIPPED | OUTPATIENT
Start: 2021-04-06 | End: 2021-07-09

## 2021-04-06 NOTE — TELEPHONE ENCOUNTER
Your information has been submitted to [employer]. [employer] will review the request and will issue a decision, typically within 3-7 days from your submission. You can check the updated outcome later by reopening this request.    If [employer] has not responded in 3-7 days or if you have any questions about your ePA request, please contact [employer] at [phone]. If you think there may be a problem with your PA request, use our live chat feature at the bottom right.

## 2021-04-08 NOTE — TELEPHONE ENCOUNTER
Approvedon April 7  Status: Approved  Coverage Starts on: 4/7/2021 11:31:50 AM, Coverage Ends on: 4/7/2021 11:31:50 AM    PHARMACY AND PT INFORMED

## 2021-04-13 ENCOUNTER — OFFICE VISIT (OUTPATIENT)
Dept: INTERNAL MEDICINE | Facility: CLINIC | Age: 41
End: 2021-04-13

## 2021-04-13 VITALS
WEIGHT: 277 LBS | OXYGEN SATURATION: 98 % | SYSTOLIC BLOOD PRESSURE: 132 MMHG | HEIGHT: 66 IN | HEART RATE: 78 BPM | DIASTOLIC BLOOD PRESSURE: 86 MMHG | TEMPERATURE: 97.7 F | BODY MASS INDEX: 44.52 KG/M2

## 2021-04-13 DIAGNOSIS — Z00.00 MEDICARE ANNUAL WELLNESS VISIT, SUBSEQUENT: Primary | ICD-10-CM

## 2021-04-13 DIAGNOSIS — R10.9 RIGHT SIDED ABDOMINAL PAIN: ICD-10-CM

## 2021-04-13 DIAGNOSIS — F31.9 BIPOLAR I DISORDER WITH MIXED FEATURES (HCC): Chronic | ICD-10-CM

## 2021-04-13 DIAGNOSIS — E66.01 CLASS 3 SEVERE OBESITY DUE TO EXCESS CALORIES WITH SERIOUS COMORBIDITY AND BODY MASS INDEX (BMI) OF 40.0 TO 44.9 IN ADULT (HCC): ICD-10-CM

## 2021-04-13 PROCEDURE — 1125F AMNT PAIN NOTED PAIN PRSNT: CPT | Performed by: PHYSICIAN ASSISTANT

## 2021-04-13 PROCEDURE — 1170F FXNL STATUS ASSESSED: CPT | Performed by: PHYSICIAN ASSISTANT

## 2021-04-13 PROCEDURE — 99396 PREV VISIT EST AGE 40-64: CPT | Performed by: PHYSICIAN ASSISTANT

## 2021-04-13 PROCEDURE — 1160F RVW MEDS BY RX/DR IN RCRD: CPT | Performed by: PHYSICIAN ASSISTANT

## 2021-04-13 PROCEDURE — 96160 PT-FOCUSED HLTH RISK ASSMT: CPT | Performed by: PHYSICIAN ASSISTANT

## 2021-04-13 PROCEDURE — G0439 PPPS, SUBSEQ VISIT: HCPCS | Performed by: PHYSICIAN ASSISTANT

## 2021-04-13 RX ORDER — LAMOTRIGINE 150 MG/1
150 TABLET ORAL 2 TIMES DAILY
COMMUNITY
Start: 2021-04-06 | End: 2021-05-13 | Stop reason: SDUPTHER

## 2021-04-13 NOTE — PROGRESS NOTES
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  She started zoloft 12.5 mg early 3 days ago and feels she may need to continue it a few extra days this month.  Zoloft has continued to be very helpful with managing PMDD symptoms.    She has had a linear bruise on her right inner thigh for a few days. No edema or pain. Quit smoking 21 with use of Chantix which she resumed on 2021. No CP or SOA.  She was taking aspirin 325 daily for about 2 weeks due to superficial thrombophlebitis from an IV but stopped it a couple of days ago. Received J&J vaccine 3/10.  She was evaluated with CBC 2 weeks after the J&J vaccine and was noted to have normal platelet count.        Nicole Altman is a 40 y.o. female who presents for a Subsequent Medicare Wellness Visit.    HEALTH RISK ASSESSMENT    Recent Hospitalizations:  No hospitalization(s) within the last year.    Current Medical Providers:  Patient Care Team:  Amy Connelly PA as PCP - General (Family Medicine)    Smoking Status:  Social History     Tobacco Use   Smoking Status Former Smoker   • Packs/day: 1.00   • Types: Cigarettes   • Start date: 1996   • Quit date: 2021   • Years since quittin.1   Smokeless Tobacco Never Used       Alcohol Consumption:  Social History     Substance and Sexual Activity   Alcohol Use No   • Alcohol/week: 0.0 standard drinks    Comment: recovering alcoholic       Depression Screen:   PHQ-2/PHQ-9 Depression Screening 2021   Little interest or pleasure in doing things 3   Feeling down, depressed, or hopeless 3   Trouble falling or staying asleep, or sleeping too much 0   Feeling tired or having little energy 3   Poor appetite or  overeating 3   Feeling bad about yourself - or that you are a failure or have let yourself or your family down 3   Trouble concentrating on things, such as reading the newspaper or watching television 3   Moving or speaking so slowly that other people could have noticed. Or the opposite - being so fidgety or restless that you have been moving around a lot more than usual 3   Thoughts that you would be better off dead, or of hurting yourself in some way 0   Total Score 21   If you checked off any problems, how difficult have these problems made it for you to do your work, take care of things at home, or get along with other people? Extremely dIfficult       Fall Risk Screen:  [facility] Fall Risk Assessment has not been completed.    Health Habits and Functional and Cognitive Screening:  Functional & Cognitive Status 4/13/2021   Do you have difficulty preparing food and eating? No   Do you have difficulty bathing yourself, getting dressed or grooming yourself? No   Do you have difficulty using the toilet? No   Do you have difficulty moving around from place to place? No   Do you have trouble with steps or getting out of a bed or a chair? No   Current Diet Unhealthy Diet   Dental Exam Not up to date   Eye Exam Up to date   Exercise (times per week) 2 times per week   Current Exercise Activities Include Yoga   Do you need help using the phone?  No   Are you deaf or do you have serious difficulty hearing?  No   Do you need help with transportation? No   Do you need help shopping? No   Do you need help preparing meals?  No   Do you need help with housework?  No   Do you need help with laundry? No   Do you need help taking your medications? No   Do you need help managing money? No   Do you ever drive or ride in a car without wearing a seat belt? No   Have you felt unusual stress, anger or loneliness in the last month? Yes   Who do you live with? Other   If you need help, do you have trouble finding someone available to you?  Yes   Do you have difficulty concentrating, remembering or making decisions? Yes         Does the patient have evidence of cognitive impairment? No    Asprin use counseling:Does not need ASA (and currently is not on it)    Age-appropriate Screening Schedule:  Refer to the list below for future screening recommendations based on patient's age, sex and/or medical conditions. Orders for these recommended tests are listed in the plan section. The patient has been provided with a written plan.    Health Maintenance   Topic Date Due   • PAP SMEAR  12/16/2019   • TDAP/TD VACCINES (1 - Tdap) 04/19/2022 (Originally 8/25/1997)   • INFLUENZA VACCINE  08/01/2021          The following portions of the patient's history were reviewed and updated as appropriate: allergies, current medications, past family history, past medical history, past social history, past surgical history and problem list.    Outpatient Medications Prior to Visit   Medication Sig Dispense Refill   • albuterol sulfate  (90 Base) MCG/ACT inhaler Inhale 2 puffs Every 4 (Four) Hours As Needed for Wheezing or Shortness of Air. Indications: Asthma 36 g 5   • busPIRone (BUSPAR) 10 MG tablet Take 1 tablet by mouth 3 (Three) Times a Day. 90 tablet 5   • irbesartan (AVAPRO) 300 MG tablet TAKE 1 TABLET BY MOUTH EVERY DAY AT NIGHT 90 tablet 1   • lamoTRIgine (LaMICtal) 150 MG tablet Take 150 mg by mouth 2 (Two) Times a Day.     • loperamide (IMODIUM) 2 MG capsule Take 1 capsule by mouth 4 (Four) Times a Day As Needed for Diarrhea. 12 capsule 0   • metoprolol succinate XL (TOPROL-XL) 100 MG 24 hr tablet Take 1 tablet by mouth Every Night. Indications: High Blood Pressure Disorder 90 tablet 1   • ondansetron ODT (ZOFRAN-ODT) 4 MG disintegrating tablet Place 1 tablet on the tongue Every 6 (Six) Hours As Needed for Nausea or Vomiting. 20 tablet 0   • pantoprazole (PROTONIX) 40 MG EC tablet Take 40 mg by mouth Every Night.     • Saphris 10 MG sublingual tablet  sublingual tablet Place 1 tablet under the tongue 2 (Two) Times a Day. 60 tablet 5   • sertraline (Zoloft) 25 MG tablet Take 1 tablet by mouth daily for 14 days beginning on the 14th of each month.  Indications: Bipolar depression 14 tablet 3   • SUMAtriptan (IMITREX) 50 MG tablet Take one tablet at onset of headache. May repeat dose one time in 2 hours if headache not relieved. 9 tablet 4   • varenicline (CHANTIX) 1 MG tablet Take 1 tablet by mouth 2 (Two) Times a Day. 60 tablet 4   • cyanocobalamin (V-R VITAMIN B-12) 500 MCG tablet Take 1 tablet by mouth Daily. 90 tablet 3   • l-methylfolate calcium (DEPLIN) 7.5 MG tablet tablet Take 1 tablet by mouth Daily. 90 tablet 3   • lithium carbonate 150 MG capsule Take 1 capsule by mouth twice daily for 7 days then reduce to once daily for 7 days, then discontinue.  Indications: Manic-Depression 21 capsule 0   • ondansetron ODT (Zofran ODT) 8 MG disintegrating tablet Dissolve 1/2 to 1 tablet SL every 8 hours as needed for nausea.  Indications: nausea 12 tablet 5     No facility-administered medications prior to visit.       Patient Active Problem List   Diagnosis   • Migraine without aura and without status migrainosus, not intractable   • Tension headache   • Mild persistent asthma without complication   • Cigarette smoker   • History of alcohol dependence (CMS/HCC)   • Essential hypertension   • History of psychoactive drug abuse (CMS/HCC)   • Premenstrual dysphoric disorder   • Bipolar I disorder with mixed features (CMS/HCC)   • Abnormal CT scan, colon   • Heartburn   • Prediabetes   • B12 deficiency   • Anxiety   • History of suicide attempt   • Morbid (severe) obesity due to excess calories (CMS/HCC)   • Autism spectrum disorder requiring support (level 1)   • Adrenal nodule (CMS/HCC)       Advanced Care Planning:  ACP discussion was held with the patient during this visit. Patient does not have an advance directive, information provided.    Review of Systems      Constitutional: Positive for unexpected weight change (gain). Negative for appetite change, chills, fatigue and fever.        No malaise   HENT: Negative for ear pain, hearing loss, nosebleeds, rhinorrhea, sore throat, trouble swallowing and voice change.    Eyes: Negative for pain, discharge, redness, itching and visual disturbance.        No dry eyes   Respiratory: Negative for cough, shortness of breath and wheezing.         No SOB with exertion  No SOB lying down   Cardiovascular: Negative for chest pain, palpitations and leg swelling.        No leg cramps     Gastrointestinal: Positive for abdominal pain. Negative for blood in stool, constipation, diarrhea, nausea and vomiting.        No change in bowel habits  No heartburn   Endocrine: Negative for cold intolerance, heat intolerance, polydipsia, polyphagia and polyuria.        No hot flashes  No muscle weakness  No feeling of weakness   Genitourinary: Negative for difficulty urinating, dyspareunia, dysuria, frequency, hematuria, menstrual problem, pelvic pain, urgency, vaginal discharge and vaginal pain.        No urinary incontinence  No change in periods   Musculoskeletal: Negative for arthralgias, joint swelling and myalgias.        No limb pain  No limb swelling   Skin: Negative for rash and wound.        No rash  No lesions  No change in mole  No itching   Neurological: Negative for dizziness (resolved), seizures, syncope, weakness and numbness.   Hematological: Negative for adenopathy. Bruises/bleeds easily.   Psychiatric/Behavioral: Positive for agitation (much improved), decreased concentration (acute due to situation, otherwise much improved) and dysphoric mood. Negative for confusion, sleep disturbance and suicidal ideas. The patient is nervous/anxious. The patient is not hyperactive.        Compared to one year ago, the patient feels her physical health is worse. Mobility decreasing and weight gain due to lack of activity.  Compared to one year  "ago, the patient feels her mental health is better. Worse for the last week but in general much improved.     Reviewed chart for potential of high risk medication in the elderly: not applicable  Reviewed chart for potential of harmful drug interactions in the elderly:not applicable    Objective         Vitals:    04/13/21 1250   BP: 132/86   Pulse: 78   Temp: 97.7 °F (36.5 °C)   SpO2: 98%   Weight: 126 kg (277 lb)   Height: 167.6 cm (65.98\")       Body mass index is 44.73 kg/m².  Discussed the patient's BMI with her. Patient's (Body mass index is 44.73 kg/m².) indicates that they are morbidly obese (BMI > 40 or > 35 with obesity - related health condition) with obesity-related health conditions that include hypertension and GERD . Obesity is worsening. BMI is is above average; BMI management plan is completed. We discussed portion control and increasing exercise.       Physical Exam  Vitals and nursing note reviewed.   Constitutional:       General: She is awake. She is not in acute distress.     Appearance: She is well-developed. She is morbidly obese. She is not ill-appearing, toxic-appearing or diaphoretic.      Interventions: Face mask in place.   HENT:      Head: Normocephalic and atraumatic.      Right Ear: External ear normal.      Left Ear: External ear normal.      Nose: Nose normal.      Mouth/Throat:      Pharynx: No oropharyngeal exudate.   Eyes:      General: No scleral icterus.     Conjunctiva/sclera: Conjunctivae normal.      Pupils: Pupils are equal, round, and reactive to light.   Neck:      Thyroid: No thyromegaly.   Cardiovascular:      Rate and Rhythm: Normal rate and regular rhythm.      Heart sounds: Normal heart sounds. No murmur heard.   No friction rub. No gallop.    Pulmonary:      Effort: Pulmonary effort is normal. No respiratory distress.      Breath sounds: Normal breath sounds. No wheezing, rhonchi or rales.   Chest:      Chest wall: No tenderness.   Abdominal:      General: A surgical " scar is present. Bowel sounds are normal. There is no distension.      Palpations: Abdomen is soft. There is no shifting dullness, hepatomegaly, splenomegaly or mass.      Tenderness: There is abdominal tenderness. There is no right CVA tenderness, left CVA tenderness, guarding or rebound. Negative signs include Elias's sign, Rovsing's sign, McBurney's sign, psoas sign and obturator sign.      Hernia: No hernia (hernia not felt with palpation but still of concern) is present.       Musculoskeletal:         General: No tenderness or deformity. Normal range of motion.      Cervical back: Normal range of motion and neck supple. No rigidity or tenderness.      Right lower leg: No edema.      Left lower leg: No edema.   Lymphadenopathy:      Cervical: No cervical adenopathy.   Skin:     General: Skin is warm and dry.      Capillary Refill: Capillary refill takes less than 2 seconds.      Coloration: Skin is not jaundiced or pale.      Findings: Bruising present. No erythema or rash.          Neurological:      General: No focal deficit present.      Mental Status: She is alert and oriented to person, place, and time.      Cranial Nerves: No cranial nerve deficit.      Sensory: No sensory deficit.      Coordination: Coordination normal.      Gait: Gait normal.      Deep Tendon Reflexes: Reflexes normal.   Psychiatric:         Attention and Perception: Attention and perception normal. She is attentive.         Mood and Affect: Mood is anxious and depressed. Mood is not elated. Affect is not labile, blunt, flat, angry or tearful.         Speech: Speech normal.         Behavior: Behavior is withdrawn (slight, improved by end of visit). Behavior is cooperative.         Thought Content: Thought content normal.         Cognition and Memory: Cognition and memory normal.         Judgment: Judgment normal.         Lab Results   Component Value Date     (H) 02/26/2021    HGBA1C 5.90 (H) 02/26/2021        Assessment/Plan    Medicare Risks and Personalized Health Plan  CMS Preventative Services Quick Reference  Advance Directive Discussion  Depression/Dysphoria  Inactivity/Sedentary  Obesity/Overweight     The above risks/problems have been discussed with the patient.  Pertinent information has been shared with the patient in the After Visit Summary.  Follow up plans and orders are seen below in the Assessment/Plan Section.    Diagnoses and all orders for this visit:    1. Medicare annual wellness visit, subsequent (Primary)    2. Class 3 severe obesity due to excess calories with serious comorbidity and body mass index (BMI) of 40.0 to 44.9 in adult (CMS/[facility])  Patient's (Body mass index is 44.73 kg/m².) indicates that they are morbidly obese (BMI > 40 or > 35 with obesity - related health condition) with obesity-related health conditions that include hypertension . Obesity is worsening. BMI is is above average; BMI management plan is completed. We discussed low calorie, low carb based diet program, portion control, increasing exercise and joining a fitness center or start home based exercise program.     Weight gain is at least in part contributed to by antipsychotic use for control of bipolar disorder.  She has been referred to bariatric surgery for consideration of weight loss surgery and is in the process of completing new patient paperwork before scheduling her appointment.  She was encouraged to begin a low carbohydrate, low-fat and high-protein diet as well as to begin exercising as tolerated.    3. Bipolar I disorder with mixed features (CMS/[facility])  Symptoms much improved over the last year with initiation of Saphris for bipolar disorder as well as Zoloft for PMDD.  She is currently experiencing episodic depression due to a recent event but is expected to return to baseline within a few days.  She will notify me if symptoms fail to improve.    4. Right sided abdominal pain  -     Ambulatory Referral to General  Surgery          Follow Up:  Return in about 3 months (around 7/13/2021) for Next scheduled follow up.     An After Visit Summary and PPPS were given to the patient.

## 2021-04-26 ENCOUNTER — OFFICE VISIT (OUTPATIENT)
Dept: SURGERY | Facility: CLINIC | Age: 41
End: 2021-04-26

## 2021-04-26 VITALS
DIASTOLIC BLOOD PRESSURE: 70 MMHG | TEMPERATURE: 98.6 F | WEIGHT: 278.4 LBS | BODY MASS INDEX: 44.74 KG/M2 | HEART RATE: 76 BPM | OXYGEN SATURATION: 99 % | SYSTOLIC BLOOD PRESSURE: 110 MMHG | HEIGHT: 66 IN

## 2021-04-26 DIAGNOSIS — R10.11 RIGHT UPPER QUADRANT ABDOMINAL PAIN: Primary | ICD-10-CM

## 2021-04-26 PROCEDURE — 99203 OFFICE O/P NEW LOW 30 MIN: CPT | Performed by: SURGERY

## 2021-06-03 ENCOUNTER — TELEPHONE (OUTPATIENT)
Dept: SURGERY | Facility: CLINIC | Age: 41
End: 2021-06-03

## 2021-06-03 NOTE — TELEPHONE ENCOUNTER
Called the patient to reschedule the appointment from 05/10/21 with dr colbert. Left a message, also sending a letter

## 2021-06-24 NOTE — ED NOTES
Called and spoke with Yoon and notified her for consult.      Meena Roy RN  01/10/21 1213    
Nutritional services contacted for a food tray for the patient.     [facility]  01/10/21 6070    
Patient given regular dinner tray.      Meena Roy, RN  01/10/21 4127    
Patient is ambulatory to the bathroom without difficulty.      Meena Roy, RN  01/10/21 4403    
Report to VANNA Barrientos at [facility].      Meena Roy RN  01/10/21 7222    
gait dysfunction due to CVA

## 2021-07-09 ENCOUNTER — TELEMEDICINE (OUTPATIENT)
Dept: INTERNAL MEDICINE | Facility: CLINIC | Age: 41
End: 2021-07-09

## 2021-07-09 DIAGNOSIS — F31.9 BIPOLAR I DISORDER WITH MIXED FEATURES (HCC): Primary | ICD-10-CM

## 2021-07-09 DIAGNOSIS — F32.81 PREMENSTRUAL DYSPHORIC DISORDER: ICD-10-CM

## 2021-07-09 PROCEDURE — 99213 OFFICE O/P EST LOW 20 MIN: CPT | Performed by: PHYSICIAN ASSISTANT

## 2021-07-09 RX ORDER — ARIPIPRAZOLE 10 MG/1
10 TABLET ORAL DAILY
Qty: 30 TABLET | Refills: 2 | Status: SHIPPED | OUTPATIENT
Start: 2021-07-09 | End: 2021-09-16

## 2021-07-09 NOTE — PROGRESS NOTES
You have chosen to receive care through a telehealth visit.  Do you consent to use a video/audio connection for your medical care today? Yes  Active parties: Amy Connelly PA-C, Mervat Akbar CMA, Nicole Altman         Follow Up Office Visit      Patient Name: Nicole Altman  : 1980   MRN: [id number]     Chief Complaint:    Chief Complaint   Patient presents with   • Follow-up     Bipolar I disorder       History of Present Illness: Nicole Altman is a 40 y.o. female who is here today for follow up of bipolar disorder with mixed features.  Today she reports that she did a telehealth visit with a psychiatrist around 1 week ago at which time she requested to change from Zoloft to Celexa which has historically induced carlos manuel.  Instead of switching to Celexa the psychiatrist increased her Zoloft dose from 25 to 50 mg daily.  She has not yet seen any benefit from the Zoloft dose increase.  For the last several months she has been bothered by lack of motivation and reports that she sits around doing nothing all day and feels it herself to get up and do something but cannot seem to do so.  She has been taking Saphris for the last 7-8 months and initially reported improved symptoms but feels as though it makes her too tired and does not work well enough.  Saphris dose increase did not seem to help any and possibly made fatigue worse.  Zoloft has continued to significantly reduce suicidal ideation in the premenstrual phase, however as she has gotten older her menstrual cycle has become more irregular so she is spending more and more time in the premenstrual phase which is causing worsened depression.  She denies any manic symptoms.      Subjective      I have reviewed and the following portions of the patient's history were updated as appropriate: past family history, past medical history, past social history, past surgical history and problem list.      Current Outpatient Medications:   •  albuterol sulfate HFA  108 (90 Base) MCG/ACT inhaler, Inhale 2 puffs Every 4 (Four) Hours As Needed for Wheezing or Shortness of Air. Indications: Asthma, Disp: 36 g, Rfl: 5  •  busPIRone (BUSPAR) 10 MG tablet, Take 1 tablet by mouth 3 (Three) Times a Day., Disp: 90 tablet, Rfl: 11  •  cyanocobalamin (V-R VITAMIN B-12) 500 MCG tablet, Take 1 tablet by mouth Daily., Disp: 90 tablet, Rfl: 3  •  irbesartan (AVAPRO) 300 MG tablet, TAKE 1 TABLET BY MOUTH EVERY DAY AT NIGHT, Disp: 90 tablet, Rfl: 1  •  lamoTRIgine (LaMICtal) 150 MG tablet, Take 1 tablet by mouth 2 (Two) Times a Day., Disp: 60 tablet, Rfl: 11  •  loperamide (IMODIUM) 2 MG capsule, Take 1 capsule by mouth 4 (Four) Times a Day As Needed for Diarrhea., Disp: 12 capsule, Rfl: 0  •  metoprolol succinate XL (TOPROL-XL) 100 MG 24 hr tablet, Take 1 tablet by mouth Every Night. Indications: High Blood Pressure Disorder, Disp: 90 tablet, Rfl: 1  •  ondansetron ODT (ZOFRAN-ODT) 4 MG disintegrating tablet, Place 1 tablet on the tongue Every 6 (Six) Hours As Needed for Nausea or Vomiting., Disp: 20 tablet, Rfl: 0  •  pantoprazole (PROTONIX) 40 MG EC tablet, Take 1 tablet by mouth Every Night. Indications: Gastroesophageal Reflux Disease, Disp: 30 tablet, Rfl: 11  •  sertraline (ZOLOFT) 50 MG tablet, Take 1 tablet by mouth Daily., Disp: 30 tablet, Rfl: 2  •  SUMAtriptan (IMITREX) 50 MG tablet, Take one tablet at onset of headache. May repeat dose one time in 2 hours if headache not relieved., Disp: 9 tablet, Rfl: 4  •  ARIPiprazole (Abilify) 10 MG tablet, Take 1 tablet by mouth Daily., Disp: 30 tablet, Rfl: 2  •  l-methylfolate calcium (DEPLIN) 7.5 MG tablet tablet, Take 1 tablet by mouth Daily., Disp: 90 tablet, Rfl: 3    Allergies   Allergen Reactions   • Celexa [Citalopram Hydrobromide] Delirium     Induced carlos manuel.   • Clozapine Mental Status Change   • Haloperidol And Related Anaphylaxis   • Abilify [Aripiprazole] Mental Status Change   • Sulfa Antibiotics Dermatitis     Blisters in mouth    • Metronidazole Other (See Comments)     Facial issue   • Clonidine Derivatives Other (See Comments)     Used to intentionally overdose.    • Lisinopril Other (See Comments)     Pt unable to recall reaction   • Nifedipine Other (See Comments)     headahce   • Propranolol Other (See Comments)     Used to intentionally overdose.        Objective     Physical Exam:  Vital Signs: There were no vitals filed for this visit.  There is no height or weight on file to calculate BMI.    Physical Exam  Nursing note reviewed.   Constitutional:       General: She is not in acute distress.     Appearance: She is well-developed. She is obese. She is not ill-appearing, toxic-appearing or diaphoretic.   HENT:      Head: Normocephalic and atraumatic.   Eyes:      General: No scleral icterus.        Right eye: No discharge.         Left eye: No discharge.   Pulmonary:      Effort: Pulmonary effort is normal. No respiratory distress.   Musculoskeletal:      Cervical back: Full passive range of motion without pain and normal range of motion. No pain with movement. Normal range of motion.   Skin:     Coloration: Skin is not jaundiced or pale.      Findings: No erythema or rash.   Neurological:      Mental Status: She is alert and oriented to person, place, and time.      Coordination: Coordination normal.   Psychiatric:         Attention and Perception: Attention and perception normal.         Mood and Affect: Mood is depressed. Mood is not elated. Affect is not labile, blunt, flat, angry or tearful.         Speech: Speech normal.         Behavior: Behavior normal. Behavior is cooperative.         Thought Content: Thought content normal.         Cognition and Memory: Cognition normal.         Judgment: Judgment normal.         Common labs    Common Labsle 1/10/21 1/10/21 2/26/21 2/26/21 3/27/21 3/27/21    1243 1243 1512 1512 1159 1159   Glucose  105 (A)    214 (A)   Glucose    108 (A)     BUN  13  17  18   Creatinine  0.90  0.95  1.20  (A)   eGFR Non  Am  69  65  49 (A)   eGFR  Am    78     Sodium  137  139  136   Potassium  4.6  4.3  4.3   Chloride  103  103  100   Calcium  9.6  9.9  9.5   Total Protein    6.8     Albumin  4.20  4.00  4.30   Total Bilirubin  0.4  0.2  0.4   Alkaline Phosphatase  106  102  115   AST (SGOT)  13  15  16   ALT (SGPT)  9  9  10   WBC 10.77    11.21 (A)    Hemoglobin 13.3    13.5    Hematocrit 42.6    42.2    Platelets 297    322    Hemoglobin A1C   5.90 (A)      (A) Abnormal value       Comments are available for some flowsheets but are not being displayed.               Assessment / Plan      Assessment/Plan:   Diagnoses and all orders for this visit:    1. Bipolar I disorder with mixed features (CMS/HCC) (Primary)  -     ARIPiprazole (Abilify) 10 MG tablet; Take 1 tablet by mouth Daily.  Dispense: 30 tablet; Refill: 2  -     sertraline (ZOLOFT) 50 MG tablet; Take 1 tablet by mouth Daily.  Dispense: 30 tablet; Refill: 2    Discontinue Saphris due to lack of efficacy and instead begin Abilify 10 mg daily.  Continue increased dose of sertraline at 50 mg daily.    2. Premenstrual dysphoric disorder  -     Continue increased dose: Sertraline (ZOLOFT) 50 MG tablet; Take 1 tablet by mouth Daily.  Dispense: 30 tablet; Refill: 2       Follow-up in around 1 month or sooner if needed.    I spent 21 minutes caring for Nicole on this date of service. This time includes time spent by me in the following activities:preparing for the visit, performing a medically appropriate examination and/or evaluation , counseling and educating the patient/family/caregiver, ordering medications, tests, or procedures and documenting information in the medical record    Follow Up:   No follow-ups on file.    Patient was given instructions and counseling regarding her condition or for health maintenance advice. Please see specific information pulled into the AVS if appropriate.     Amy Connelly PA-C  Primary [facility]  [place]     Please note that portions of this note may have been completed with a voice recognition program. Efforts were made to edit the dictations, but occasionally words are mistranscribed.

## 2021-08-06 RX ORDER — IRBESARTAN 300 MG/1
TABLET ORAL
Qty: 90 TABLET | Refills: 0 | Status: SHIPPED | OUTPATIENT
Start: 2021-08-06 | End: 2021-09-16 | Stop reason: SDUPTHER

## 2021-08-09 ENCOUNTER — DOCUMENTATION (OUTPATIENT)
Dept: INTERNAL MEDICINE | Facility: CLINIC | Age: 41
End: 2021-08-09

## 2021-08-09 DIAGNOSIS — F31.9 BIPOLAR I DISORDER WITH MIXED FEATURES (HCC): ICD-10-CM

## 2021-08-09 RX ORDER — BUSPIRONE HYDROCHLORIDE 10 MG/1
10 TABLET ORAL 3 TIMES DAILY
Qty: 90 TABLET | Refills: 11 | Status: SHIPPED | OUTPATIENT
Start: 2021-08-09 | End: 2021-09-16 | Stop reason: SDUPTHER

## 2021-08-16 ENCOUNTER — PATIENT MESSAGE (OUTPATIENT)
Dept: INTERNAL MEDICINE | Facility: CLINIC | Age: 41
End: 2021-08-16

## 2021-08-16 ENCOUNTER — DOCUMENTATION (OUTPATIENT)
Dept: INTERNAL MEDICINE | Facility: CLINIC | Age: 41
End: 2021-08-16

## 2021-08-16 RX ORDER — HYDROXYZINE PAMOATE 25 MG/1
25 CAPSULE ORAL 3 TIMES DAILY PRN
Qty: 90 CAPSULE | Refills: 5 | Status: SHIPPED | OUTPATIENT
Start: 2021-08-16 | End: 2022-05-24

## 2021-08-16 RX ORDER — BUPROPION HYDROCHLORIDE 300 MG/1
300 TABLET ORAL DAILY
Qty: 30 TABLET | Refills: 1 | Status: SHIPPED | OUTPATIENT
Start: 2021-08-16 | End: 2021-09-16 | Stop reason: SDUPTHER

## 2021-08-16 NOTE — TELEPHONE ENCOUNTER
From: Nicole Early  To: PATRICIA Mark  Sent: 8/16/2021 5:14 PM EDT  Subject: Prescription Question    Hello    Could you please prescribe a low dose of visteral?    [facility] they used it for anxiety/panic attacks and I found it helpful.    thank you  marlena early

## 2021-09-07 RX ORDER — METOPROLOL SUCCINATE 100 MG/1
TABLET, EXTENDED RELEASE ORAL
Qty: 90 TABLET | Refills: 3 | Status: SHIPPED | OUTPATIENT
Start: 2021-09-07 | End: 2022-08-23 | Stop reason: SDUPTHER

## 2021-09-16 ENCOUNTER — TELEMEDICINE (OUTPATIENT)
Dept: INTERNAL MEDICINE | Facility: CLINIC | Age: 41
End: 2021-09-16

## 2021-09-16 VITALS
HEIGHT: 66 IN | DIASTOLIC BLOOD PRESSURE: 90 MMHG | HEART RATE: 80 BPM | BODY MASS INDEX: 44.96 KG/M2 | SYSTOLIC BLOOD PRESSURE: 133 MMHG

## 2021-09-16 DIAGNOSIS — F31.9 BIPOLAR I DISORDER WITH MIXED FEATURES (HCC): Primary | ICD-10-CM

## 2021-09-16 PROCEDURE — 99214 OFFICE O/P EST MOD 30 MIN: CPT | Performed by: PHYSICIAN ASSISTANT

## 2021-09-16 RX ORDER — BUSPIRONE HYDROCHLORIDE 15 MG/1
15 TABLET ORAL 3 TIMES DAILY
Qty: 90 TABLET | Refills: 5 | Status: SHIPPED | OUTPATIENT
Start: 2021-09-16 | End: 2022-04-22

## 2021-09-16 RX ORDER — BUPROPION HYDROCHLORIDE 150 MG/1
150 TABLET ORAL EVERY MORNING
Qty: 30 TABLET | Refills: 5 | Status: SHIPPED | OUTPATIENT
Start: 2021-09-16 | End: 2023-02-27

## 2021-09-16 RX ORDER — OXCARBAZEPINE 300 MG/1
300 TABLET, FILM COATED ORAL 2 TIMES DAILY
COMMUNITY
End: 2021-09-16 | Stop reason: SDUPTHER

## 2021-09-16 RX ORDER — ASENAPINE MALEATE 5 MG/1
5 TABLET SUBLINGUAL 2 TIMES DAILY
Qty: 60 TABLET | Refills: 2 | Status: SHIPPED | OUTPATIENT
Start: 2021-09-16 | End: 2022-04-22

## 2021-09-16 RX ORDER — ONDANSETRON 4 MG/1
4 TABLET, ORALLY DISINTEGRATING ORAL EVERY 6 HOURS PRN
Qty: 20 TABLET | Refills: 0 | Status: SHIPPED | OUTPATIENT
Start: 2021-09-16 | End: 2021-10-08

## 2021-09-16 RX ORDER — IRBESARTAN 300 MG/1
300 TABLET ORAL
Qty: 30 TABLET | Refills: 5 | Status: SHIPPED | OUTPATIENT
Start: 2021-09-16 | End: 2022-08-23 | Stop reason: SDUPTHER

## 2021-09-16 RX ORDER — OXCARBAZEPINE 300 MG/1
300 TABLET, FILM COATED ORAL 2 TIMES DAILY
Qty: 60 TABLET | Refills: 5 | OUTPATIENT
Start: 2021-09-16 | End: 2022-06-28

## 2021-09-16 NOTE — PROGRESS NOTES
You have chosen to receive care through a telehealth visit.  Do you consent to use a video/audio connection for your medical care today? Yes  Involved parties: Mervat Akbar CMA; Amy Connelly PA-C; Nicole Altman         Follow Up Office Visit      Patient Name: Nicole Altman  : 1980   MRN: [id number]     Chief Complaint:    Chief Complaint   Patient presents with   • Follow-up     HOSPITAL FOLLOW UP, Bipolar disorder       History of Present Illness: Nicole Altman is a 41 y.o. female who presents today via video visit for follow up of bipolar disorder.  She was recently admitted for partial hospitalization at the [facility] and has since graduated from outpatient care.  During admission and outpatient treatment she was started on Wellbutrin 300 mg daily which was then reduced to 150 mg daily in case it had contributed to anxiety.  Trileptal was initiated and she is unsure if it has been helpful, however the psychiatrist she was seeing seemed to think it had been.  BuSpar dose was increased to 15 mg 3 times daily which continues to help, however, she has been experiencing overwhelming anxiety since discontinuing antipsychotic therapy.  Saphris was previously discontinued and instead Vraylar was used which was very helpful for the first month but then she developed extraparametal symptoms and it was discontinued.  She previously did well with Saphris for controlling anxiety and depression, however it did cause some fatigue after the dose was increased from 5 to 10 mg twice daily.  Without use of an antipsychotic she has extreme difficulties with anger and anxiety so she would like to resume Saphris if possible.  No recent SI or HI.        Subjective      I have reviewed and the following portions of the patient's history were updated as appropriate: past family history, past medical history, past social history, past surgical history and problem list.      Current Outpatient Medications:   •  albuterol  sulfate  (90 Base) MCG/ACT inhaler, Inhale 2 puffs Every 4 (Four) Hours As Needed for Wheezing or Shortness of Air. Indications: Asthma, Disp: 36 g, Rfl: 5  •  buPROPion XL (Wellbutrin XL) 150 MG 24 hr tablet, Take 1 tablet by mouth Every Morning for 30 days., Disp: 30 tablet, Rfl: 5  •  busPIRone (BUSPAR) 15 MG tablet, Take 1 tablet by mouth 3 (Three) Times a Day., Disp: 90 tablet, Rfl: 5  •  cyanocobalamin (V-R VITAMIN B-12) 500 MCG tablet, Take 1 tablet by mouth Daily., Disp: 90 tablet, Rfl: 3  •  hydrOXYzine pamoate (VISTARIL) 25 MG capsule, Take 1 capsule by mouth 3 (Three) Times a Day As Needed for Anxiety., Disp: 90 capsule, Rfl: 5  •  irbesartan (AVAPRO) 300 MG tablet, Take 1 tablet by mouth every night at bedtime., Disp: 30 tablet, Rfl: 5  •  lamoTRIgine (LaMICtal) 150 MG tablet, Take 1 tablet by mouth 2 (Two) Times a Day., Disp: 60 tablet, Rfl: 11  •  loperamide (IMODIUM) 2 MG capsule, Take 1 capsule by mouth 4 (Four) Times a Day As Needed for Diarrhea., Disp: 12 capsule, Rfl: 0  •  metoprolol succinate XL (TOPROL-XL) 100 MG 24 hr tablet, TAKE 1 TABLET BY MOUTH EVERY DAY AT NIGHT, Disp: 90 tablet, Rfl: 3  •  ondansetron ODT (ZOFRAN-ODT) 4 MG disintegrating tablet, Place 1 tablet on the tongue Every 6 (Six) Hours As Needed for Nausea or Vomiting., Disp: 20 tablet, Rfl: 0  •  OXcarbazepine (TRILEPTAL) 300 MG tablet, Take 1 tablet by mouth 2 (Two) Times a Day., Disp: 60 tablet, Rfl: 5  •  pantoprazole (PROTONIX) 40 MG EC tablet, Take 1 tablet by mouth Every Night. Indications: Gastroesophageal Reflux Disease, Disp: 30 tablet, Rfl: 11  •  SUMAtriptan (IMITREX) 50 MG tablet, Take one tablet at onset of headache. May repeat dose one time in 2 hours if headache not relieved., Disp: 9 tablet, Rfl: 4  •  Saphris 5 MG sublingual tablet sublingual tablet, Place 1 tablet under the tongue 2 (Two) Times a Day., Disp: 60 tablet, Rfl: 2    Allergies   Allergen Reactions   • Celexa [Citalopram Hydrobromide] Delirium  "    Induced carlos manuel.   • Clozapine Mental Status Change   • Haloperidol And Related Anaphylaxis   • Abilify [Aripiprazole] Mental Status Change   • Sulfa Antibiotics Dermatitis     Blisters in mouth   • Metronidazole Other (See Comments)     Facial issue   • Clonidine Derivatives Other (See Comments)     Used to intentionally overdose.    • Lisinopril Other (See Comments)     Pt unable to recall reaction   • Nifedipine Other (See Comments)     headahce   • Propranolol Other (See Comments)     Used to intentionally overdose.        Objective     Physical Exam:  Vital Signs:   Vitals:    09/16/21 1208   BP: 133/90   Pulse: 80   Height: 167.6 cm (65.98\")     Body mass index is 44.96 kg/m².    Physical Exam  Vitals and nursing note reviewed.   Constitutional:       General: She is not in acute distress.     Appearance: She is well-developed. She is obese. She is not ill-appearing, toxic-appearing or diaphoretic.   HENT:      Head: Normocephalic and atraumatic.      Right Ear: External ear normal.      Left Ear: External ear normal.   Eyes:      General: No scleral icterus.        Right eye: No discharge.         Left eye: No discharge.   Pulmonary:      Effort: Pulmonary effort is normal. No respiratory distress.   Musculoskeletal:      Cervical back: Full passive range of motion without pain and normal range of motion. No pain with movement. Normal range of motion.   Skin:     Coloration: Skin is not jaundiced or pale.      Findings: No erythema or rash.   Neurological:      Mental Status: She is alert and oriented to person, place, and time.      Coordination: Coordination normal.   Psychiatric:         Attention and Perception: Attention and perception normal.         Mood and Affect: Mood is anxious. Mood is not depressed. Affect is not blunt, flat, angry, tearful or inappropriate.         Speech: Speech normal.         Behavior: Behavior normal. Behavior is cooperative.         Thought Content: Thought content " normal.         Cognition and Memory: Cognition and memory normal.         Judgment: Judgment normal.         Common labs    Common Labsle 1/10/21 1/10/21 2/26/21 2/26/21 3/27/21 3/27/21    1243 1243 1512 1512 1159 1159   Glucose  105 (A)    214 (A)   Glucose    108 (A)     BUN  13  17  18   Creatinine  0.90  0.95  1.20 (A)   eGFR Non African Am  69  65  49 (A)   eGFR  Am    78     Sodium  137  139  136   Potassium  4.6  4.3  4.3   Chloride  103  103  100   Calcium  9.6  9.9  9.5   Total Protein    6.8     Albumin  4.20  4.00  4.30   Total Bilirubin  0.4  0.2  0.4   Alkaline Phosphatase  106  102  115   AST (SGOT)  13  15  16   ALT (SGPT)  9  9  10   WBC 10.77    11.21 (A)    Hemoglobin 13.3    13.5    Hematocrit 42.6    42.2    Platelets 297    322    Hemoglobin A1C   5.90 (A)      (A) Abnormal value       Comments are available for some flowsheets but are not being displayed.               Assessment / Plan      Assessment/Plan:   Diagnoses and all orders for this visit:    1. Bipolar I disorder with mixed features (CMS/HCC) (Primary)  -     Saphris 5 MG sublingual tablet sublingual tablet; Place 1 tablet under the tongue 2 (Two) Times a Day.  Dispense: 60 tablet; Refill: 2  -     buPROPion XL (Wellbutrin XL) 150 MG 24 hr tablet; Take 1 tablet by mouth Every Morning for 30 days.  Dispense: 30 tablet; Refill: 5  -     OXcarbazepine (TRILEPTAL) 300 MG tablet; Take 1 tablet by mouth 2 (Two) Times a Day.  Dispense: 60 tablet; Refill: 5  -     busPIRone (BUSPAR) 15 MG tablet; Take 1 tablet by mouth 3 (Three) Times a Day.  Dispense: 90 tablet; Refill: 5         Continue Trileptal, Wellbutrin, Lamictal and BuSpar.  Resume Saphris 5 mg twice daily.  Recheck in 1 month or sooner if needed.      I spent 33 minutes caring for Nicole on this date of service. This time includes time spent by me in the following activities:preparing for the visit, reviewing tests, performing a medically appropriate examination and/or  evaluation , counseling and educating the patient/family/caregiver, ordering medications, tests, or procedures, documenting information in the medical record and care coordination    Follow Up:   Return in about 1 month (around 10/16/2021) for video.    Patient was given instructions and counseling regarding her condition or for health maintenance advice. Please see specific information pulled into the AVS if appropriate.     Amy Connelly PA-C  Primary Care [facility]     Please note that portions of this note may have been completed with a voice recognition program. Efforts were made to edit the dictations, but occasionally words are mistranscribed.

## 2021-09-20 DIAGNOSIS — R29.818 SUSPECTED SLEEP APNEA: Primary | ICD-10-CM

## 2021-09-20 DIAGNOSIS — R06.81 WITNESSED APNEIC SPELLS: ICD-10-CM

## 2021-09-20 DIAGNOSIS — R40.0 HAS DAYTIME DROWSINESS: ICD-10-CM

## 2021-10-08 RX ORDER — ONDANSETRON 4 MG/1
TABLET, ORALLY DISINTEGRATING ORAL
Qty: 20 TABLET | Refills: 2 | Status: SHIPPED | OUTPATIENT
Start: 2021-10-08 | End: 2022-04-22 | Stop reason: SDUPTHER

## 2022-04-22 ENCOUNTER — OFFICE VISIT (OUTPATIENT)
Dept: INTERNAL MEDICINE | Facility: CLINIC | Age: 42
End: 2022-04-22

## 2022-04-22 VITALS
HEIGHT: 66 IN | HEART RATE: 109 BPM | TEMPERATURE: 97.3 F | DIASTOLIC BLOOD PRESSURE: 84 MMHG | OXYGEN SATURATION: 98 % | WEIGHT: 282 LBS | BODY MASS INDEX: 45.32 KG/M2 | SYSTOLIC BLOOD PRESSURE: 128 MMHG

## 2022-04-22 DIAGNOSIS — Z00.00 ENCOUNTER FOR SUBSEQUENT ANNUAL WELLNESS VISIT (AWV) IN MEDICARE PATIENT: Primary | ICD-10-CM

## 2022-04-22 DIAGNOSIS — R73.03 PREDIABETES: ICD-10-CM

## 2022-04-22 DIAGNOSIS — E66.01 CLASS 3 SEVERE OBESITY DUE TO EXCESS CALORIES WITH SERIOUS COMORBIDITY AND BODY MASS INDEX (BMI) OF 45.0 TO 49.9 IN ADULT: ICD-10-CM

## 2022-04-22 DIAGNOSIS — I10 ESSENTIAL HYPERTENSION: Chronic | ICD-10-CM

## 2022-04-22 DIAGNOSIS — F31.9 BIPOLAR I DISORDER WITH MIXED FEATURES: Chronic | ICD-10-CM

## 2022-04-22 DIAGNOSIS — F84.0 AUTISM SPECTRUM DISORDER REQUIRING SUPPORT (LEVEL 1): Chronic | ICD-10-CM

## 2022-04-22 DIAGNOSIS — G43.009 MIGRAINE WITHOUT AURA AND WITHOUT STATUS MIGRAINOSUS, NOT INTRACTABLE: ICD-10-CM

## 2022-04-22 DIAGNOSIS — J45.30 MILD PERSISTENT ASTHMA WITHOUT COMPLICATION: Chronic | ICD-10-CM

## 2022-04-22 DIAGNOSIS — F41.9 ANXIETY: Chronic | ICD-10-CM

## 2022-04-22 PROCEDURE — G0439 PPPS, SUBSEQ VISIT: HCPCS | Performed by: NURSE PRACTITIONER

## 2022-04-22 PROCEDURE — 1159F MED LIST DOCD IN RCRD: CPT | Performed by: NURSE PRACTITIONER

## 2022-04-22 PROCEDURE — 1170F FXNL STATUS ASSESSED: CPT | Performed by: NURSE PRACTITIONER

## 2022-04-22 PROCEDURE — 96160 PT-FOCUSED HLTH RISK ASSMT: CPT | Performed by: NURSE PRACTITIONER

## 2022-04-22 RX ORDER — ONDANSETRON 4 MG/1
4 TABLET, ORALLY DISINTEGRATING ORAL EVERY 8 HOURS PRN
Qty: 20 TABLET | Refills: 2 | Status: SHIPPED | OUTPATIENT
Start: 2022-04-22 | End: 2022-12-28 | Stop reason: SDUPTHER

## 2022-04-22 RX ORDER — SUMATRIPTAN 50 MG/1
TABLET, FILM COATED ORAL
Qty: 9 TABLET | Refills: 4 | Status: SHIPPED | OUTPATIENT
Start: 2022-04-22 | End: 2022-12-28 | Stop reason: SDUPTHER

## 2022-04-22 RX ORDER — ATOMOXETINE 100 MG/1
100 CAPSULE ORAL DAILY
COMMUNITY
Start: 2022-04-14

## 2022-05-02 ENCOUNTER — PATIENT MESSAGE (OUTPATIENT)
Dept: INTERNAL MEDICINE | Facility: CLINIC | Age: 42
End: 2022-05-02

## 2022-05-03 RX ORDER — BENZONATATE 100 MG/1
100 CAPSULE ORAL 3 TIMES DAILY PRN
Qty: 21 CAPSULE | Refills: 0 | Status: SHIPPED | OUTPATIENT
Start: 2022-05-03 | End: 2022-05-24

## 2022-05-03 NOTE — TELEPHONE ENCOUNTER
Mervat Akbar MA 5/2/2022 1:09 PM EDT      ----- Message -----  From: Glenn Early  Sent: 5/2/2022 12:27 PM EDT  To: [facility]  Subject: cough stuff     could you please call in some tussin pearls? they're the only cough stuff I can take (bc of sober living)  negative covid, coughing, mucus on throat, no voice  please and thank you  glenn early  1980  karrie garrett

## 2022-05-07 ENCOUNTER — TELEMEDICINE (OUTPATIENT)
Dept: INTERNAL MEDICINE | Facility: CLINIC | Age: 42
End: 2022-05-07

## 2022-05-07 DIAGNOSIS — J20.9 ACUTE BRONCHITIS, UNSPECIFIED ORGANISM: Primary | ICD-10-CM

## 2022-05-07 PROCEDURE — 99213 OFFICE O/P EST LOW 20 MIN: CPT | Performed by: INTERNAL MEDICINE

## 2022-05-07 RX ORDER — GUAIFENESIN 600 MG/1
600 TABLET, EXTENDED RELEASE ORAL 2 TIMES DAILY
Qty: 20 TABLET | Refills: 0 | Status: SHIPPED | OUTPATIENT
Start: 2022-05-07 | End: 2022-10-03

## 2022-05-07 RX ORDER — AZITHROMYCIN 250 MG/1
TABLET, FILM COATED ORAL
Qty: 6 TABLET | Refills: 0 | Status: SHIPPED | OUTPATIENT
Start: 2022-05-07 | End: 2022-05-24

## 2022-05-07 RX ORDER — METHYLPREDNISOLONE 4 MG/1
TABLET ORAL
Qty: 21 EACH | Refills: 0 | Status: SHIPPED | OUTPATIENT
Start: 2022-05-07 | End: 2022-05-24
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
## 2022-05-24 ENCOUNTER — TELEMEDICINE (OUTPATIENT)
Dept: FAMILY MEDICINE CLINIC | Facility: TELEHEALTH | Age: 42
End: 2022-05-24

## 2022-05-24 ENCOUNTER — PATIENT MESSAGE (OUTPATIENT)
Dept: INTERNAL MEDICINE | Facility: CLINIC | Age: 42
End: 2022-05-24

## 2022-05-24 DIAGNOSIS — J45.41 MODERATE PERSISTENT ASTHMA WITH EXACERBATION: Primary | ICD-10-CM

## 2022-05-24 PROCEDURE — 99213 OFFICE O/P EST LOW 20 MIN: CPT | Performed by: NURSE PRACTITIONER

## 2022-05-24 RX ORDER — BENZONATATE 100 MG/1
CAPSULE ORAL
Qty: 21 CAPSULE | Refills: 0 | OUTPATIENT
Start: 2022-05-24

## 2022-05-24 RX ORDER — ALBUTEROL SULFATE 2.5 MG/3ML
2.5 SOLUTION RESPIRATORY (INHALATION) EVERY 4 HOURS PRN
Qty: 25 EACH | Refills: 0 | Status: SHIPPED | OUTPATIENT
Start: 2022-05-24

## 2022-05-24 RX ORDER — METHYLPREDNISOLONE 4 MG/1
TABLET ORAL
Qty: 21 TABLET | Refills: 0 | Status: SHIPPED | OUTPATIENT
Start: 2022-05-24 | End: 2022-06-01

## 2022-05-24 RX ORDER — BENZONATATE 100 MG/1
100 CAPSULE ORAL 3 TIMES DAILY PRN
Qty: 21 CAPSULE | Refills: 0 | Status: SHIPPED | OUTPATIENT
Start: 2022-05-24 | End: 2022-06-01 | Stop reason: SDUPTHER

## 2022-05-24 RX ORDER — BUPROPION HYDROCHLORIDE 150 MG/1
TABLET ORAL
COMMUNITY
Start: 2022-05-14 | End: 2022-05-24 | Stop reason: SDUPTHER

## 2022-05-24 NOTE — PATIENT INSTRUCTIONS
If your symptoms do not improve in 2-3 days or worsen at anytime follow up at urgent care or  your primary care provider.     Asthma, Adult    Asthma is a long-term (chronic) condition in which the airways get tight and narrow. The airways are the breathing passages that lead from the nose and mouth down into the lungs. A person with asthma will have times when symptoms get worse. These are called asthma attacks. They can cause coughing, whistling sounds when you breathe (wheezing), shortness of breath, and chest pain. They can make it hard to breathe. There is no cure for asthma, but medicines and lifestyle changes can help control it.  There are many things that can bring on an asthma attack or make asthma symptoms worse (triggers). Common triggers include:  Mold.  Dust.  Cigarette smoke.  Cockroaches.  Things that can cause allergy symptoms (allergens). These include animal skin flakes (dander) and pollen from trees or grass.  Things that pollute the air. These may include household , wood smoke, smog, or chemical odors.  Cold air, weather changes, and wind.  Crying or laughing hard.  Stress.  Certain medicines or drugs.  Certain foods such as dried fruit, potato chips, and grape juice.  Infections, such as a cold or the flu.  Certain medical conditions or diseases.  Exercise or tiring activities.  Asthma may be treated with medicines and by staying away from the things that cause asthma attacks. Types of medicines may include:  Controller medicines. These help prevent asthma symptoms. They are usually taken every day.  Fast-acting reliever or rescue medicines. These quickly relieve asthma symptoms. They are used as needed and provide short-term relief.  Allergy medicines if your attacks are brought on by allergens.  Medicines to help control the body's defense (immune) system.  Follow these instructions at home:  Avoiding triggers in your home  Change your heating and air conditioning filter  often.  Limit your use of fireplaces and wood stoves.  Get rid of pests (such as roaches and mice) and their droppings.  Throw away plants if you see mold on them.  Clean your floors. Dust regularly. Use cleaning products that do not smell.  Have someone vacuum when you are not home. Use a vacuum  with a HEPA filter if possible.  Replace carpet with wood, tile, or vinyl jose antonio. Carpet can trap animal skin flakes and dust.  Use allergy-proof pillows, mattress covers, and box spring covers.  Wash bed sheets and blankets every week in hot water. Dry them in a dryer.  Keep your bedroom free of any triggers.  Avoid pets and keep windows closed when things that cause allergy symptoms are in the air.  Use blankets that are made of polyester or cotton.  Clean bathrooms and evgeny with bleach. If possible, have someone repaint the walls in these rooms with mold-resistant paint. Keep out of the rooms that are being cleaned and painted.  Wash your hands often with soap and water. If soap and water are not available, use hand .  Do not allow anyone to smoke in your home.  General instructions  Take over-the-counter and prescription medicines only as told by your doctor.  Talk with your doctor if you have questions about how or when to take your medicines.  Make note if you need to use your medicines more often than usual.  Do not use any products that contain nicotine or tobacco, such as cigarettes and e-cigarettes. If you need help quitting, ask your doctor.  Stay away from secondhand smoke.  Avoid doing things outdoors when allergen counts are high and when air quality is low.  Wear a ski mask when doing outdoor activities in the winter. The mask should cover your nose and mouth. Exercise indoors on cold days if you can.  Warm up before you exercise. Take time to cool down after exercise.  Use a peak flow meter as told by your doctor. A peak flow meter is a tool that measures how well the lungs are  working.  Keep track of the peak flow meter's readings. Write them down.  Follow your asthma action plan. This is a written plan for taking care of your asthma and treating your attacks.  Make sure you get all the shots (vaccines) that your doctor recommends. Ask your doctor about a flu shot and a pneumonia shot.  Keep all follow-up visits as told by your doctor. This is important.  Contact a doctor if:  You have wheezing, shortness of breath, or a cough even while taking medicine to prevent attacks.  The mucus you cough up (sputum) is thicker than usual.  The mucus you cough up changes from clear or white to yellow, green, gray, or bloody.  You have problems from the medicine you are taking, such as:  A rash.  Itching.  Swelling.  Trouble breathing.  You need reliever medicines more than 2-3 times a week.  Your peak flow reading is still at 50-79% of your personal best after following the action plan for 1 hour.  You have a fever.  Get help right away if:  You seem to be worse and are not responding to medicine during an asthma attack.  You are short of breath even at rest.  You get short of breath when doing very little activity.  You have trouble eating, drinking, or talking.  You have chest pain or tightness.  You have a fast heartbeat.  Your lips or fingernails start to turn blue.  You are light-headed or dizzy, or you faint.  Your peak flow is less than 50% of your personal best.  You feel too tired to breathe normally.  Summary  Asthma is a long-term (chronic) condition in which the airways get tight and narrow. An asthma attack can make it hard to breathe.  Asthma cannot be cured, but medicines and lifestyle changes can help control it.  Make sure you understand how to avoid triggers and how and when to use your medicines.  This information is not intended to replace advice given to you by your health care provider. Make sure you discuss any questions you have with your health care provider.  Document  Revised: 04/21/2021 Document Reviewed: 04/21/2021  Elsevier Patient Education © 2021 [employer]

## 2022-05-24 NOTE — TELEPHONE ENCOUNTER
Stalin Chou MA 5/24/2022 10:04 AM EDT      ----- Message -----  From: Nicole Altman  Sent: 5/24/2022 9:38 AM EDT  To: [facility]  Subject: benhector stephensbharat wynne  could you please call in another prescription for tesslon perles? I sent a request through my pharmacy as well. I saw the urgent care doctor this morning and of course forgot to ask for them.  I greatly appreciate everything y'all do.  Thank you  Kin
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
## 2022-06-01 ENCOUNTER — OFFICE VISIT (OUTPATIENT)
Dept: INTERNAL MEDICINE | Facility: CLINIC | Age: 42
End: 2022-06-01

## 2022-06-01 VITALS
OXYGEN SATURATION: 98 % | HEART RATE: 78 BPM | HEIGHT: 66 IN | DIASTOLIC BLOOD PRESSURE: 78 MMHG | SYSTOLIC BLOOD PRESSURE: 120 MMHG | WEIGHT: 282 LBS | TEMPERATURE: 98 F | BODY MASS INDEX: 45.32 KG/M2

## 2022-06-01 DIAGNOSIS — F41.9 ANXIETY: Chronic | ICD-10-CM

## 2022-06-01 DIAGNOSIS — J45.30 MILD PERSISTENT ASTHMA WITHOUT COMPLICATION: Chronic | ICD-10-CM

## 2022-06-01 DIAGNOSIS — F31.9 BIPOLAR I DISORDER WITH MIXED FEATURES: Chronic | ICD-10-CM

## 2022-06-01 DIAGNOSIS — I10 ESSENTIAL HYPERTENSION: Primary | Chronic | ICD-10-CM

## 2022-06-01 DIAGNOSIS — R05.9 COUGH: ICD-10-CM

## 2022-06-01 PROCEDURE — 99213 OFFICE O/P EST LOW 20 MIN: CPT | Performed by: NURSE PRACTITIONER

## 2022-06-01 RX ORDER — BENZONATATE 100 MG/1
100 CAPSULE ORAL 3 TIMES DAILY PRN
Qty: 21 CAPSULE | Refills: 0 | Status: SHIPPED | OUTPATIENT
Start: 2022-06-01 | End: 2022-10-03

## 2022-06-01 NOTE — PROGRESS NOTES
"     Office Visit      Patient Name: Nicole Altman  : 1980   MRN: [id number]     Chief Complaint:    Chief Complaint   Patient presents with   • Cough       History of Present Illness: Nicole Altman is a 41 y.o. female who is here today with occasional pain with swallowing, worse on the left.  Feels there is something between her throat and ear.  Hoarse voice.  Continues to have a cough, nebs are effective when needed.  Has mild persistent asthma.  Albuterol use has increased slightly.  Tessalon perles prn, usually at night; works well.  Denies fever, chills, dizziness, nausea, vomiting, rash, myalgia.    [place]: PMDD is \"not good\".  Requested to take Effexor, provider worries about HTN.  Blood pressure is well controlled today.  Continues to take metoprolol 100 mg daily. Denies chest pain, orthopnea, palpitations, lower extremity edema, confusion, weakness, visual disturbances.    Strattera has been effective for ADHD. Considering starting stimulant therapy.  Currently discussing this with mental health provider.    Subjective      I have reviewed and the following portions of the patient's history were updated as appropriate: past family history, past medical history, past social history, past surgical history and problem list.      Current Outpatient Medications:   •  albuterol (PROVENTIL) (2.5 MG/3ML) 0.083% nebulizer solution, Take 2.5 mg by nebulization Every 4 (Four) Hours As Needed for Wheezing., Disp: 25 each, Rfl: 0  •  atomoxetine (STRATTERA) 100 MG capsule, Take 100 mg by mouth Daily., Disp: , Rfl:   •  benzonatate (Tessalon Perles) 100 MG capsule, Take 1 capsule by mouth 3 (Three) Times a Day As Needed for Cough., Disp: 21 capsule, Rfl: 0  •  buPROPion XL (Wellbutrin XL) 150 MG 24 hr tablet, Take 1 tablet by mouth Every Morning for 30 days., Disp: 30 tablet, Rfl: 5  •  guaiFENesin (Mucinex) 600 MG 12 hr tablet, Take 1 tablet by mouth 2 (Two) Times a Day., Disp: 20 tablet, Rfl: 0  •  " irbesartan (AVAPRO) 300 MG tablet, Take 1 tablet by mouth every night at bedtime., Disp: 30 tablet, Rfl: 5  •  lamoTRIgine (LaMICtal) 150 MG tablet, Take 1 tablet by mouth 2 (Two) Times a Day., Disp: 60 tablet, Rfl: 11  •  loperamide (IMODIUM) 2 MG capsule, Take 1 capsule by mouth 4 (Four) Times a Day As Needed for Diarrhea. (Patient taking differently: Take 2 mg by mouth As Needed for Diarrhea.), Disp: 12 capsule, Rfl: 0  •  metoprolol succinate XL (TOPROL-XL) 100 MG 24 hr tablet, TAKE 1 TABLET BY MOUTH EVERY DAY AT NIGHT, Disp: 90 tablet, Rfl: 3  •  Nebulizers (Compressor/Nebulizer) misc, 1 each Every 6 (Six) Hours As Needed (shortness of breath)., Disp: 1 each, Rfl: 0  •  ondansetron ODT (ZOFRAN-ODT) 4 MG disintegrating tablet, Place 1 tablet on the tongue Every 8 (Eight) Hours As Needed for Nausea or Vomiting., Disp: 20 tablet, Rfl: 2  •  OXcarbazepine (TRILEPTAL) 300 MG tablet, Take 1 tablet by mouth 2 (Two) Times a Day., Disp: 60 tablet, Rfl: 5  •  pantoprazole (PROTONIX) 40 MG EC tablet, Take 1 tablet by mouth Every Night. Indications: Gastroesophageal Reflux Disease, Disp: 30 tablet, Rfl: 11  •  SUMAtriptan (IMITREX) 50 MG tablet, Take one tablet at onset of headache. May repeat dose one time in 2 hours if headache not relieved.  Indications: Headache, Disp: 9 tablet, Rfl: 4    Allergies   Allergen Reactions   • Celexa [Citalopram Hydrobromide] Delirium     Induced carlos manuel.   • Clozapine Mental Status Change   • Haloperidol And Related Anaphylaxis   • Abilify [Aripiprazole] Mental Status Change   • Sulfa Antibiotics Dermatitis     Blisters in mouth   • Metronidazole Other (See Comments)     Facial issue   • Clonidine Derivatives Other (See Comments)     Used to intentionally overdose.    • Lisinopril Other (See Comments)     Pt unable to recall reaction   • Nifedipine Other (See Comments)     headahce   • Propranolol Other (See Comments)     Used to intentionally overdose.        Objective     Physical  "Exam:  Vital Signs:   Vitals:    06/01/22 1437   BP: 120/78   Pulse: 78   Temp: 98 °F (36.7 °C)   SpO2: 98%   Weight: 128 kg (282 lb)   Height: 167.6 cm (65.98\")     Body mass index is 45.54 kg/m².    Physical Exam  Constitutional:       Appearance: She is not ill-appearing.   HENT:      Head: Normocephalic.      Right Ear: Tympanic membrane, ear canal and external ear normal.      Left Ear: Tympanic membrane, ear canal and external ear normal.      Nose: No congestion.      Mouth/Throat:      Mouth: Mucous membranes are moist.      Pharynx: Oropharynx is clear.   Eyes:      Conjunctiva/sclera: Conjunctivae normal.      Pupils: Pupils are equal, round, and reactive to light.   Cardiovascular:      Rate and Rhythm: Normal rate and regular rhythm.      Pulses:           Radial pulses are 2+ on the right side and 2+ on the left side.        Dorsalis pedis pulses are 2+ on the right side and 2+ on the left side.      Heart sounds: Normal heart sounds.   Pulmonary:      Effort: Pulmonary effort is normal.      Breath sounds: Normal breath sounds. No wheezing, rhonchi or rales.   Musculoskeletal:      Cervical back: Normal range of motion and neck supple.      Right lower leg: No edema.      Left lower leg: No edema.   Skin:     General: Skin is warm.      Capillary Refill: Capillary refill takes less than 2 seconds.   Neurological:      Mental Status: She is alert and oriented to person, place, and time.      Coordination: Coordination normal.      Gait: Gait normal.   Psychiatric:         Mood and Affect: Mood normal. Affect is flat (normal for N[place]).         Speech: Speech normal.         Behavior: Behavior normal.         Assessment / Plan      Assessment/Plan:   Diagnoses and all orders for this visit:    1. Essential hypertension (Primary)        - Follow heart healthy diet.  Keep sodium intake < 1500 mg per day.  Avoid processed & fast foods.          - Exercise as tolerated, with a goal of 30 minutes of moderate " exercise most days.         - Take medications as prescribed.    2. Bipolar I disorder with mixed features (HCC)        - Continue current medications, counseling.  Follow up with mental health provider as scheduled     3. Anxiety        - Continue current medications, counseling.  Follow up with mental health provider as scheduled     4. Cough  -     benzonatate (Tessalon Perles) 100 MG capsule; Take 1 capsule by mouth 3 (Three) Times a Day As Needed for Cough.  Dispense: 21 capsule; Refill: 0    5. Mild persistent asthma without complication        - Continue using albuterol, nebulizer as needed       Follow Up:   Return if symptoms worsen or fail to improve.    Patient was given instructions and counseling regarding her condition or for health maintenance advice. Please see specific information pulled into the AVS if appropriate.       [facility]an Martin     Please note that portions of this note may have been completed with a voice recognition program. Efforts were made to edit dictation, but occasionally words are mistranscribed.

## 2022-06-25 ENCOUNTER — APPOINTMENT (OUTPATIENT)
Dept: GENERAL RADIOLOGY | Facility: HOSPITAL | Age: 42
End: 2022-06-25

## 2022-06-25 ENCOUNTER — HOSPITAL ENCOUNTER (EMERGENCY)
Facility: HOSPITAL | Age: 42
Discharge: HOME | End: 2022-06-26
Attending: FAMILY MEDICINE | Admitting: FAMILY MEDICINE

## 2022-06-25 DIAGNOSIS — R00.2 PALPITATIONS: Primary | ICD-10-CM

## 2022-06-25 LAB
ALBUMIN SERPL-MCNC: 3.9 G/DL (ref 3.5–5.2)
ALBUMIN/GLOB SERPL: 1.3 G/DL
ALP SERPL-CCNC: 105 U/L (ref 39–117)
ALT SERPL W P-5'-P-CCNC: 10 U/L (ref 1–33)
AMPHET+METHAMPHET UR QL: NEGATIVE
AMPHETAMINES UR QL: NEGATIVE
ANION GAP SERPL CALCULATED.3IONS-SCNC: 12.7 MMOL/L (ref 5–15)
AST SERPL-CCNC: 12 U/L (ref 1–32)
B-HCG UR QL: NEGATIVE
BACTERIA UR QL AUTO: ABNORMAL /HPF
BARBITURATES UR QL SCN: NEGATIVE
BASOPHILS # BLD AUTO: 0.06 10*3/MM3 (ref 0–0.2)
BASOPHILS NFR BLD AUTO: 0.8 % (ref 0–1.5)
BENZODIAZ UR QL SCN: NEGATIVE
BILIRUB SERPL-MCNC: 0.3 MG/DL (ref 0–1.2)
BILIRUB UR QL STRIP: NEGATIVE
BUN SERPL-MCNC: 6 MG/DL (ref 6–20)
BUN/CREAT SERPL: 6.8 (ref 7–25)
BUPRENORPHINE SERPL-MCNC: NEGATIVE NG/ML
CALCIUM SPEC-SCNC: 9.2 MG/DL (ref 8.6–10.5)
CANNABINOIDS SERPL QL: NEGATIVE
CHLORIDE SERPL-SCNC: 102 MMOL/L (ref 98–107)
CLARITY UR: CLEAR
CO2 SERPL-SCNC: 20.3 MMOL/L (ref 22–29)
COCAINE UR QL: NEGATIVE
COLOR UR: YELLOW
CREAT SERPL-MCNC: 0.88 MG/DL (ref 0.57–1)
CRP SERPL-MCNC: 1.71 MG/DL (ref 0–0.5)
D DIMER PPP FEU-MCNC: 0.35 MCGFEU/ML (ref 0–0.57)
DEPRECATED RDW RBC AUTO: 43.2 FL (ref 37–54)
EGFRCR SERPLBLD CKD-EPI 2021: 83.7 ML/MIN/1.73
EOSINOPHIL # BLD AUTO: 0.24 10*3/MM3 (ref 0–0.4)
EOSINOPHIL NFR BLD AUTO: 3.4 % (ref 0.3–6.2)
ERYTHROCYTE [DISTWIDTH] IN BLOOD BY AUTOMATED COUNT: 14.6 % (ref 12.3–15.4)
GLOBULIN UR ELPH-MCNC: 3 GM/DL
GLUCOSE SERPL-MCNC: 139 MG/DL (ref 65–99)
GLUCOSE UR STRIP-MCNC: NEGATIVE MG/DL
HCT VFR BLD AUTO: 34.6 % (ref 34–46.6)
HGB BLD-MCNC: 11.6 G/DL (ref 12–15.9)
HGB UR QL STRIP.AUTO: NEGATIVE
HYALINE CASTS UR QL AUTO: ABNORMAL /LPF
IMM GRANULOCYTES # BLD AUTO: 0.02 10*3/MM3 (ref 0–0.05)
IMM GRANULOCYTES NFR BLD AUTO: 0.3 % (ref 0–0.5)
KETONES UR QL STRIP: ABNORMAL
LEUKOCYTE ESTERASE UR QL STRIP.AUTO: ABNORMAL
LIPASE SERPL-CCNC: 31 U/L (ref 13–60)
LYMPHOCYTES # BLD AUTO: 2.27 10*3/MM3 (ref 0.7–3.1)
LYMPHOCYTES NFR BLD AUTO: 32 % (ref 19.6–45.3)
MCH RBC QN AUTO: 27.6 PG (ref 26.6–33)
MCHC RBC AUTO-ENTMCNC: 33.5 G/DL (ref 31.5–35.7)
MCV RBC AUTO: 82.4 FL (ref 79–97)
METHADONE UR QL SCN: NEGATIVE
MONOCYTES # BLD AUTO: 0.56 10*3/MM3 (ref 0.1–0.9)
MONOCYTES NFR BLD AUTO: 7.9 % (ref 5–12)
NEUTROPHILS NFR BLD AUTO: 3.95 10*3/MM3 (ref 1.7–7)
NEUTROPHILS NFR BLD AUTO: 55.6 % (ref 42.7–76)
NITRITE UR QL STRIP: NEGATIVE
NRBC BLD AUTO-RTO: 0 /100 WBC (ref 0–0.2)
NT-PROBNP SERPL-MCNC: 694.2 PG/ML (ref 0–450)
OPIATES UR QL: NEGATIVE
OXYCODONE UR QL SCN: NEGATIVE
PCP UR QL SCN: NEGATIVE
PH UR STRIP.AUTO: 5.5 [PH] (ref 5–8)
PLATELET # BLD AUTO: 272 10*3/MM3 (ref 140–450)
PMV BLD AUTO: 10.2 FL (ref 6–12)
POTASSIUM SERPL-SCNC: 3.7 MMOL/L (ref 3.5–5.2)
PROPOXYPH UR QL: NEGATIVE
PROT SERPL-MCNC: 6.9 G/DL (ref 6–8.5)
PROT UR QL STRIP: NEGATIVE
RBC # BLD AUTO: 4.2 10*6/MM3 (ref 3.77–5.28)
RBC # UR STRIP: ABNORMAL /HPF
REF LAB TEST METHOD: ABNORMAL
SODIUM SERPL-SCNC: 135 MMOL/L (ref 136–145)
SP GR UR STRIP: 1.02 (ref 1–1.03)
SQUAMOUS #/AREA URNS HPF: ABNORMAL /HPF
TRICYCLICS UR QL SCN: NEGATIVE
TROPONIN T SERPL-MCNC: <0.01 NG/ML (ref 0–0.03)
UROBILINOGEN UR QL STRIP: ABNORMAL
WBC # UR STRIP: ABNORMAL /HPF
WBC NRBC COR # BLD: 7.1 10*3/MM3 (ref 3.4–10.8)

## 2022-06-25 PROCEDURE — 71045 X-RAY EXAM CHEST 1 VIEW: CPT

## 2022-06-25 PROCEDURE — 86140 C-REACTIVE PROTEIN: CPT | Performed by: FAMILY MEDICINE

## 2022-06-25 PROCEDURE — 81025 URINE PREGNANCY TEST: CPT | Performed by: FAMILY MEDICINE

## 2022-06-25 PROCEDURE — 83880 ASSAY OF NATRIURETIC PEPTIDE: CPT | Performed by: FAMILY MEDICINE

## 2022-06-25 PROCEDURE — 83690 ASSAY OF LIPASE: CPT | Performed by: FAMILY MEDICINE

## 2022-06-25 PROCEDURE — 80306 DRUG TEST PRSMV INSTRMNT: CPT | Performed by: FAMILY MEDICINE

## 2022-06-25 PROCEDURE — 85379 FIBRIN DEGRADATION QUANT: CPT | Performed by: FAMILY MEDICINE

## 2022-06-25 PROCEDURE — 84484 ASSAY OF TROPONIN QUANT: CPT | Performed by: FAMILY MEDICINE

## 2022-06-25 PROCEDURE — 81001 URINALYSIS AUTO W/SCOPE: CPT | Performed by: FAMILY MEDICINE

## 2022-06-25 PROCEDURE — 85025 COMPLETE CBC W/AUTO DIFF WBC: CPT | Performed by: FAMILY MEDICINE

## 2022-06-25 PROCEDURE — 99283 EMERGENCY DEPT VISIT LOW MDM: CPT

## 2022-06-25 PROCEDURE — 80053 COMPREHEN METABOLIC PANEL: CPT | Performed by: FAMILY MEDICINE

## 2022-06-25 PROCEDURE — 36415 COLL VENOUS BLD VENIPUNCTURE: CPT

## 2022-06-25 RX ORDER — SODIUM CHLORIDE 0.9 % (FLUSH) 0.9 %
10 SYRINGE (ML) INJECTION AS NEEDED
Status: DISCONTINUED | OUTPATIENT
Start: 2022-06-25 | End: 2022-06-26 | Stop reason: HOSPADM

## 2022-06-25 RX ADMIN — SODIUM CHLORIDE 1000 ML: 9 INJECTION, SOLUTION INTRAVENOUS at 21:21

## 2022-06-26 VITALS
WEIGHT: 279.6 LBS | HEART RATE: 87 BPM | RESPIRATION RATE: 22 BRPM | OXYGEN SATURATION: 100 % | DIASTOLIC BLOOD PRESSURE: 88 MMHG | HEIGHT: 66 IN | BODY MASS INDEX: 44.93 KG/M2 | TEMPERATURE: 98.9 F | SYSTOLIC BLOOD PRESSURE: 144 MMHG

## 2022-06-26 LAB — TROPONIN T SERPL-MCNC: <0.01 NG/ML (ref 0–0.03)
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
## 2022-06-28 ENCOUNTER — TELEMEDICINE (OUTPATIENT)
Dept: INTERNAL MEDICINE | Facility: CLINIC | Age: 42
End: 2022-06-28

## 2022-06-28 DIAGNOSIS — F19.11: ICD-10-CM

## 2022-06-28 DIAGNOSIS — F10.21 HISTORY OF ALCOHOL DEPENDENCE: ICD-10-CM

## 2022-06-28 DIAGNOSIS — F31.9 BIPOLAR I DISORDER WITH MIXED FEATURES: Chronic | ICD-10-CM

## 2022-06-28 DIAGNOSIS — F41.9 ANXIETY: Chronic | ICD-10-CM

## 2022-06-28 DIAGNOSIS — F90.0 ATTENTION DEFICIT HYPERACTIVITY DISORDER (ADHD), PREDOMINANTLY INATTENTIVE TYPE: Primary | ICD-10-CM

## 2022-06-28 PROCEDURE — 99214 OFFICE O/P EST MOD 30 MIN: CPT | Performed by: NURSE PRACTITIONER

## 2022-06-28 RX ORDER — OXCARBAZEPINE 300 MG/1
TABLET, FILM COATED ORAL
COMMUNITY
Start: 2022-06-13

## 2022-06-29 ENCOUNTER — TELEPHONE (OUTPATIENT)
Dept: INTERNAL MEDICINE | Facility: CLINIC | Age: 42
End: 2022-06-29

## 2022-07-12 RX ORDER — PANTOPRAZOLE SODIUM 40 MG/1
40 TABLET, DELAYED RELEASE ORAL NIGHTLY
Qty: 90 TABLET | Refills: 1 | Status: SHIPPED | OUTPATIENT
Start: 2022-07-12 | End: 2022-08-23 | Stop reason: SDUPTHER

## 2022-07-12 NOTE — TELEPHONE ENCOUNTER
Caller: Nicole Altman    Relationship: Self    Best call back number: [phone]    Requested Prescriptions:   Requested Prescriptions     Pending Prescriptions Disp Refills   • pantoprazole (PROTONIX) 40 MG EC tablet 30 tablet 11     Sig: Take 1 tablet by mouth Every Night. Indications: Gastroesophageal Reflux Disease        Pharmacy where request should be sent: [id number], [place] - [address] [facility] - [phone]  - [phone] FX     Additional details provided by patient: THE PATIENT STATES THAT SHE HAS BEEN OUT OF MEDICATION FOR 7 DAYS     Does the patient have less than a 3 day supply:  [x] Yes  [] No    Rafael Hendricks   07/12/22 09:22 EDT

## 2022-07-16 ENCOUNTER — TELEPHONE (OUTPATIENT)
Dept: INTERNAL MEDICINE | Facility: CLINIC | Age: 42
End: 2022-07-16

## 2022-07-16 NOTE — TELEPHONE ENCOUNTER
I spoke to patient and explained the recommendations to her, she did verbalize she understood and understands to go to ER if she feels worse or dehydrated and or having severe abdominal pain.

## 2022-08-22 ENCOUNTER — PATIENT MESSAGE (OUTPATIENT)
Dept: INTERNAL MEDICINE | Facility: CLINIC | Age: 42
End: 2022-08-22

## 2022-08-23 RX ORDER — METOPROLOL SUCCINATE 100 MG/1
100 TABLET, EXTENDED RELEASE ORAL
Qty: 90 TABLET | Refills: 3 | Status: SHIPPED | OUTPATIENT
Start: 2022-08-23 | End: 2022-12-09 | Stop reason: DRUGHIGH

## 2022-08-23 RX ORDER — PANTOPRAZOLE SODIUM 40 MG/1
40 TABLET, DELAYED RELEASE ORAL NIGHTLY
Qty: 90 TABLET | Refills: 1 | Status: SHIPPED | OUTPATIENT
Start: 2022-08-23 | End: 2023-03-23

## 2022-08-23 RX ORDER — IRBESARTAN 300 MG/1
300 TABLET ORAL
Qty: 90 TABLET | Refills: 3 | Status: SHIPPED | OUTPATIENT
Start: 2022-08-23

## 2022-08-23 NOTE — TELEPHONE ENCOUNTER
From: Nicole Altman  To: YOANA Beltran  Sent: 8/22/2022 9:36 PM EDT  Subject: 90 day supplies    could you please call in 90 day supplies to [facility] ([facility]) for the following meds:  -protonix  -irbersarten   -meteproplol  Thank you

## 2022-09-05 ENCOUNTER — HOSPITAL ENCOUNTER (EMERGENCY)
Facility: HOSPITAL | Age: 42
Discharge: HOME | End: 2022-09-05
Attending: EMERGENCY MEDICINE | Admitting: EMERGENCY MEDICINE

## 2022-09-05 ENCOUNTER — APPOINTMENT (OUTPATIENT)
Dept: GENERAL RADIOLOGY | Facility: HOSPITAL | Age: 42
End: 2022-09-05

## 2022-09-05 VITALS
DIASTOLIC BLOOD PRESSURE: 94 MMHG | BODY MASS INDEX: 40.18 KG/M2 | HEIGHT: 66 IN | SYSTOLIC BLOOD PRESSURE: 128 MMHG | TEMPERATURE: 98.1 F | HEART RATE: 86 BPM | WEIGHT: 250 LBS | OXYGEN SATURATION: 99 % | RESPIRATION RATE: 16 BRPM

## 2022-09-05 DIAGNOSIS — U07.1 COVID-19: Primary | ICD-10-CM

## 2022-09-05 LAB
ALBUMIN SERPL-MCNC: 3.9 G/DL (ref 3.5–5.2)
ALBUMIN/GLOB SERPL: 1.2 G/DL
ALP SERPL-CCNC: 105 U/L (ref 39–117)
ALT SERPL W P-5'-P-CCNC: 12 U/L (ref 1–33)
ANION GAP SERPL CALCULATED.3IONS-SCNC: 12.6 MMOL/L (ref 5–15)
AST SERPL-CCNC: 18 U/L (ref 1–32)
B PARAPERT DNA SPEC QL NAA+PROBE: NOT DETECTED
B PERT DNA SPEC QL NAA+PROBE: NOT DETECTED
BASOPHILS # BLD AUTO: 0.03 10*3/MM3 (ref 0–0.2)
BASOPHILS NFR BLD AUTO: 0.7 % (ref 0–1.5)
BILIRUB SERPL-MCNC: 0.3 MG/DL (ref 0–1.2)
BUN SERPL-MCNC: 12 MG/DL (ref 6–20)
BUN/CREAT SERPL: 10.4 (ref 7–25)
C PNEUM DNA NPH QL NAA+NON-PROBE: NOT DETECTED
CALCIUM SPEC-SCNC: 9.5 MG/DL (ref 8.6–10.5)
CHLORIDE SERPL-SCNC: 99 MMOL/L (ref 98–107)
CO2 SERPL-SCNC: 23.4 MMOL/L (ref 22–29)
CREAT SERPL-MCNC: 1.15 MG/DL (ref 0.57–1)
DEPRECATED RDW RBC AUTO: 42.5 FL (ref 37–54)
EGFRCR SERPLBLD CKD-EPI 2021: 60.4 ML/MIN/1.73
EOSINOPHIL # BLD AUTO: 0.05 10*3/MM3 (ref 0–0.4)
EOSINOPHIL NFR BLD AUTO: 1.1 % (ref 0.3–6.2)
ERYTHROCYTE [DISTWIDTH] IN BLOOD BY AUTOMATED COUNT: 14.5 % (ref 12.3–15.4)
FLUAV SUBTYP SPEC NAA+PROBE: NOT DETECTED
FLUBV RNA ISLT QL NAA+PROBE: NOT DETECTED
GLOBULIN UR ELPH-MCNC: 3.3 GM/DL
GLUCOSE SERPL-MCNC: 209 MG/DL (ref 65–99)
HADV DNA SPEC NAA+PROBE: NOT DETECTED
HCOV 229E RNA SPEC QL NAA+PROBE: NOT DETECTED
HCOV HKU1 RNA SPEC QL NAA+PROBE: NOT DETECTED
HCOV NL63 RNA SPEC QL NAA+PROBE: NOT DETECTED
HCOV OC43 RNA SPEC QL NAA+PROBE: NOT DETECTED
HCT VFR BLD AUTO: 39.9 % (ref 34–46.6)
HGB BLD-MCNC: 13.1 G/DL (ref 12–15.9)
HMPV RNA NPH QL NAA+NON-PROBE: NOT DETECTED
HPIV1 RNA ISLT QL NAA+PROBE: NOT DETECTED
HPIV2 RNA SPEC QL NAA+PROBE: NOT DETECTED
HPIV3 RNA NPH QL NAA+PROBE: NOT DETECTED
HPIV4 P GENE NPH QL NAA+PROBE: NOT DETECTED
IMM GRANULOCYTES # BLD AUTO: 0.01 10*3/MM3 (ref 0–0.05)
IMM GRANULOCYTES NFR BLD AUTO: 0.2 % (ref 0–0.5)
LYMPHOCYTES # BLD AUTO: 1.56 10*3/MM3 (ref 0.7–3.1)
LYMPHOCYTES NFR BLD AUTO: 35.1 % (ref 19.6–45.3)
M PNEUMO IGG SER IA-ACNC: NOT DETECTED
MCH RBC QN AUTO: 27 PG (ref 26.6–33)
MCHC RBC AUTO-ENTMCNC: 32.8 G/DL (ref 31.5–35.7)
MCV RBC AUTO: 82.3 FL (ref 79–97)
MONOCYTES # BLD AUTO: 0.49 10*3/MM3 (ref 0.1–0.9)
MONOCYTES NFR BLD AUTO: 11 % (ref 5–12)
NEUTROPHILS NFR BLD AUTO: 2.31 10*3/MM3 (ref 1.7–7)
NEUTROPHILS NFR BLD AUTO: 51.9 % (ref 42.7–76)
NRBC BLD AUTO-RTO: 0 /100 WBC (ref 0–0.2)
PLATELET # BLD AUTO: 264 10*3/MM3 (ref 140–450)
PMV BLD AUTO: 10.1 FL (ref 6–12)
POTASSIUM SERPL-SCNC: 4.7 MMOL/L (ref 3.5–5.2)
PROT SERPL-MCNC: 7.2 G/DL (ref 6–8.5)
RBC # BLD AUTO: 4.85 10*6/MM3 (ref 3.77–5.28)
RHINOVIRUS RNA SPEC NAA+PROBE: NOT DETECTED
RSV RNA NPH QL NAA+NON-PROBE: NOT DETECTED
S PYO AG THROAT QL: NEGATIVE
SARS-COV-2 RNA NPH QL NAA+NON-PROBE: DETECTED
SODIUM SERPL-SCNC: 135 MMOL/L (ref 136–145)
WBC NRBC COR # BLD: 4.45 10*3/MM3 (ref 3.4–10.8)

## 2022-09-05 PROCEDURE — 87880 STREP A ASSAY W/OPTIC: CPT | Performed by: EMERGENCY MEDICINE

## 2022-09-05 PROCEDURE — 87081 CULTURE SCREEN ONLY: CPT | Performed by: EMERGENCY MEDICINE

## 2022-09-05 PROCEDURE — 0202U NFCT DS 22 TRGT SARS-COV-2: CPT | Performed by: EMERGENCY MEDICINE

## 2022-09-05 PROCEDURE — 80053 COMPREHEN METABOLIC PANEL: CPT | Performed by: EMERGENCY MEDICINE

## 2022-09-05 PROCEDURE — 36415 COLL VENOUS BLD VENIPUNCTURE: CPT

## 2022-09-05 PROCEDURE — 99283 EMERGENCY DEPT VISIT LOW MDM: CPT

## 2022-09-05 PROCEDURE — 71045 X-RAY EXAM CHEST 1 VIEW: CPT

## 2022-09-05 PROCEDURE — 85025 COMPLETE CBC W/AUTO DIFF WBC: CPT | Performed by: EMERGENCY MEDICINE

## 2022-09-05 RX ORDER — DEXAMETHASONE 6 MG/1
6 TABLET ORAL DAILY
Qty: 5 TABLET | Refills: 0 | Status: SHIPPED | OUTPATIENT
Start: 2022-09-05 | End: 2022-10-03
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
## 2022-09-07 LAB — BACTERIA SPEC AEROBE CULT: NORMAL

## 2022-10-03 ENCOUNTER — TELEMEDICINE (OUTPATIENT)
Dept: INTERNAL MEDICINE | Facility: CLINIC | Age: 42
End: 2022-10-03

## 2022-10-03 DIAGNOSIS — I10 ESSENTIAL HYPERTENSION: Primary | ICD-10-CM

## 2022-10-03 DIAGNOSIS — F90.0 ATTENTION DEFICIT HYPERACTIVITY DISORDER (ADHD), PREDOMINANTLY INATTENTIVE TYPE: ICD-10-CM

## 2022-10-03 DIAGNOSIS — F84.0 AUTISM SPECTRUM DISORDER REQUIRING SUPPORT (LEVEL 1): Chronic | ICD-10-CM

## 2022-10-03 DIAGNOSIS — F31.9 BIPOLAR I DISORDER WITH MIXED FEATURES: Chronic | ICD-10-CM

## 2022-10-03 PROCEDURE — 99214 OFFICE O/P EST MOD 30 MIN: CPT | Performed by: NURSE PRACTITIONER

## 2022-10-03 RX ORDER — HYDROCHLOROTHIAZIDE 25 MG/1
25 TABLET ORAL DAILY
Qty: 90 TABLET | Refills: 0 | Status: SHIPPED | OUTPATIENT
Start: 2022-10-03 | End: 2022-11-13 | Stop reason: SDUPTHER

## 2022-10-03 NOTE — PROGRESS NOTES
Mode of Visit: Video  Location of patient: home  You have chosen to receive care through a telehealth visit.  Does the patient consent to use a video/audio connection for your medical care today? Yes  The visit included audio and video interaction. No technical issues occurred during this visit.    Date: 10/03/2022  Name: Nicole Altman  : 1980         Chief Complaint:   Chief Complaint   Patient presents with   • Hypertension         HPI:  Nicole Altman is a 42 y.o. female presents for video visit in regard to elevated blood pressure noted over the past month.  She takes irbesartan 300 mg daily, metoprolol 100 mg at bedtime as prescribed.  Has had headaches and facial flushing, too.  Blood pressure has been normal for the past few days.  When checked today, on video visit, 130/96.  Heart rate is 77.  She does not currently have a headache or facial flushing.  Denies chest pain, dyspnea, orthopnea, palpitations, lower extremity edema, confusion, weakness, visual disturbances.     ADHD, bipolar, autism: Follows psychiatry.  Taking Strattera, dose increased about a month ago. Continues lamotrigine, Trileptal daily as prescribed.  Feels taking Strattera is working well, particularly with increased dose.  Describes feeling like the world is real, rather than a simulation.  Denies anhedonia,SI, HI.    COVID in September. Symptoms have resolved, including burning of both breasts.      History: The following portions of the patient's history were reviewed and updated as appropriate: allergies, current medications, past medical history, family history, surgical history, social history and problem list.        PE:  Physical Exam   Constitutional: She appears well-developed and well-nourished. No distress.   HENT:   Head: Normocephalic.   Right Ear: External ear normal.   Left Ear: External ear normal.   Eyes: Pupils are equal, round, and reactive to light. Conjunctivae are normal.   Neck: Neck normal  appearance.  Pulmonary/Chest: Effort normal.   Neurological: She is alert.   Oriented x 3   Skin: No pallor.   Psychiatric: Her speech is normal and behavior is normal. Her affect is flattened. Her affect is normal.          Assessment/Plan:  Diagnoses and all orders for this visit:    1. Essential hypertension (Primary)  -     hydroCHLOROthiazide (HYDRODIURIL) 25 MG tablet; Take 1 tablet by mouth Daily.  Dispense: 90 tablet; Refill: 0  - Continue metoprolol, irbesartan as prescribed.   - Will continue to monitor BP.  If over >130/ >80, will contact clinic for further evaluation.         - Follow heart healthy diet.  Keep sodium intake < 1500 mg per day.  Avoid processed & fast foods.          - Exercise as tolerated, with a goal of 30 minutes of moderate exercise most days.         - Take medications as prescribed.    2. Bipolar I disorder with mixed features (HCC)  3. Autism spectrum disorder requiring support (level 1)  4. Attention deficit hyperactivity disorder (ADHD), predominantly inattentive type         - Continue medications as prescribed.          -  Strattera may be the cause of increased blood pressure.  Neno feels it has made such a difference in mood we are going to treat HTN with an additional medication.          - Encouraged to take part in daily physical exercise.          - Eat healthy, well balanced diet; avoid sugary foods or beverages        - Continue to abstain from alcohol and drugs        - Ensure good night's sleep by creating calm space in bedroom, avoiding screen time 1-2 hours before bed, no caffeine after 5 pm        - Talk to supportive family and friends, as needed    Return in about 4 weeks (around 10/31/2022) for Next scheduled follow up.  Patient was given instructions and counseling regarding her condition or for health maintenance advice. Please see specific information pulled into the AVS if appropriate.

## 2022-10-03 NOTE — PROGRESS NOTES
You have chosen to receive care through a telehealth visit.  Do you consent to use a video/audio connection for your medical care today? Yes  Active parties: Surekha LEES, Mervat Akbar CMA, Nicole Altman

## 2022-10-08 ENCOUNTER — PATIENT MESSAGE (OUTPATIENT)
Dept: INTERNAL MEDICINE | Facility: CLINIC | Age: 42
End: 2022-10-08

## 2022-10-08 DIAGNOSIS — I10 ESSENTIAL HYPERTENSION: Primary | ICD-10-CM

## 2022-10-11 NOTE — TELEPHONE ENCOUNTER
Stalin Chuo MA 10/10/2022 10:58 AM EDT      ----- Message -----  From: Nicole Altman  Sent: 10/10/2022 10:35 AM EDT  To: Kyrie Martin [facility]  Subject: Bp medication     yes.  i do not want to take the water pill.  i would like to try another bp pill instead.

## 2022-10-14 RX ORDER — AMLODIPINE BESYLATE 5 MG/1
5 TABLET ORAL DAILY
Qty: 30 TABLET | Refills: 1 | Status: SHIPPED | OUTPATIENT
Start: 2022-10-14 | End: 2022-12-09 | Stop reason: SDUPTHER

## 2022-11-13 DIAGNOSIS — I10 ESSENTIAL HYPERTENSION: ICD-10-CM

## 2022-11-14 RX ORDER — HYDROCHLOROTHIAZIDE 25 MG/1
25 TABLET ORAL DAILY
Qty: 90 TABLET | Refills: 0 | Status: SHIPPED | OUTPATIENT
Start: 2022-11-14 | End: 2022-12-09

## 2022-11-14 RX ORDER — HYDROCHLOROTHIAZIDE 25 MG/1
TABLET ORAL
Qty: 90 TABLET | Refills: 0 | OUTPATIENT
Start: 2022-11-14

## 2022-12-01 ENCOUNTER — TELEPHONE (OUTPATIENT)
Dept: INTERNAL MEDICINE | Facility: CLINIC | Age: 42
End: 2022-12-01

## 2022-12-01 ENCOUNTER — HOSPITAL ENCOUNTER (EMERGENCY)
Facility: HOSPITAL | Age: 42
Discharge: LEFT BEFORE DISCHARGE | End: 2022-12-01

## 2022-12-01 VITALS
OXYGEN SATURATION: 99 % | SYSTOLIC BLOOD PRESSURE: 137 MMHG | HEART RATE: 91 BPM | BODY MASS INDEX: 40.18 KG/M2 | RESPIRATION RATE: 18 BRPM | DIASTOLIC BLOOD PRESSURE: 92 MMHG | HEIGHT: 66 IN | WEIGHT: 250 LBS | TEMPERATURE: 98.1 F

## 2022-12-01 PROCEDURE — 99211 OFF/OP EST MAY X REQ PHY/QHP: CPT

## 2022-12-01 NOTE — TELEPHONE ENCOUNTER
"Patient called and asked if it was \"ok to wait until 12/28 to be seen by NP\", only wants to see PCP.  States she is having L arm weakness/numbness, heart flutters a HS and \"Zaps\" in head.  Advised patient to proceed to ER, patient refused.  States she will wait for pending appt.  "

## 2022-12-02 ENCOUNTER — TELEMEDICINE (OUTPATIENT)
Dept: INTERNAL MEDICINE | Facility: CLINIC | Age: 42
End: 2022-12-02

## 2022-12-02 DIAGNOSIS — R23.2 FACIAL FLUSHING: Primary | ICD-10-CM

## 2022-12-02 DIAGNOSIS — R42 VERTIGO: ICD-10-CM

## 2022-12-02 DIAGNOSIS — R42 DIZZINESS: ICD-10-CM

## 2022-12-02 DIAGNOSIS — R00.2 FLUTTERING SENSATION OF HEART: ICD-10-CM

## 2022-12-02 DIAGNOSIS — R20.2 NUMBNESS AND TINGLING IN LEFT HAND: ICD-10-CM

## 2022-12-02 DIAGNOSIS — R20.0 NUMBNESS AND TINGLING IN LEFT HAND: ICD-10-CM

## 2022-12-02 PROCEDURE — 99214 OFFICE O/P EST MOD 30 MIN: CPT | Performed by: NURSE PRACTITIONER

## 2022-12-02 RX ORDER — AMOXICILLIN 250 MG/5ML
POWDER, FOR SUSPENSION ORAL
COMMUNITY
Start: 2022-11-29 | End: 2022-12-28

## 2022-12-02 NOTE — PROGRESS NOTES
Mode of Visit: Video  Location of patient: home  You have chosen to receive care through a telehealth visit.  Does the patient consent to use a video/audio connection for your medical care today? Yes  The visit included audio and video interaction. No technical issues occurred during this visit.    Date: 2022  Name: Nicole Altman  : 1980         Chief Complaint:   Chief Complaint   Patient presents with   • possible perimenopause symptoms         HPI:  Nicole Altman is a 42 y.o. female presents for video visit in regard to bouts of facial flushing and sweating of head and neck for about a week.  Has been awakened by something that felt like a cattle prod to her jaw and ankle, began 5 days ago.  Was having vertigo 3-4 days ago, now feels constantly dizzy without losing her balance.  Feels her heart fluttering at night, without anxious thoughts. She started menstruating in 4th grade. Is concerned she may be in perimenopause. No change in menstrual pattern or flow.  Does have a dental infection in the area where she felt pain in her jaw; no injury of ankle.  Denies chest pain/pressure, SOA, wheezing, cough, fatigue, nausea, slurred speech, asymmetrical facial or limb movement. She has noted left hand numbness that can radiate in forearm associated with left hand weakness.  Right hand dominant.  Feels symptoms may be due to holding phone in a certain position in her left hand.     Monitoring blood pressure when she doesn't feel well.  SBP normal, DBP over normal limits.  She takes hctz 25 mg, irbesartan 300 mg as prescribed.  Has been prescribed amlodipine 5 mg, has not bee taking it.  Denies orthopnea, confusion, weakness, visual disturbances.    History: The following portions of the patient's history were reviewed and updated as appropriate: allergies, current medications, past medical history, family history, surgical history, social history and problem list.        PE:  Physical Exam    Constitutional: She appears well-developed and well-nourished. No distress.   HENT:   Head: Normocephalic.   Right Ear: External ear normal.   Left Ear: External ear normal.   Eyes: Pupils are equal, round, and reactive to light. Conjunctivae are normal.   Neck: Neck normal appearance.  Pulmonary/Chest: Effort normal.   Neurological: She is alert.   Oriented x 3, facial movements symmetrical.   Skin: No pallor.   Psychiatric: She has a normal mood and affect. Her speech is normal and behavior is normal. Thought content normal. Her affect is normal.          Assessment/Plan:  Diagnoses and all orders for this visit:    1. Facial flushing (Primary)    2. Fluttering sensation of heart    3. Dizziness    4. Vertigo    5. Numbness and tingling in left hand    continue to monitor BP at home. If over normal parameters, take HCTZ 50 mg (increased dose).  Consider amlodipine 5 mg daily.   Will call 911 with slurred speech, asymmetrical facial/limb movements, chest pain, feeling of impending doom, SOA, orthopnea, nausea associated with diaphoresis/chest pain/chest pressure  May take magnesium supplement, 400 mg daily.   TSH, Free T4, TPO       Return for Next scheduled follow up.  Patient was given instructions and counseling regarding her condition or for health maintenance advice. Please see specific information pulled into the AVS if appropriate.

## 2022-12-02 NOTE — PROGRESS NOTES
You have chosen to receive care through a telehealth visit.  Do you consent to use a video/audio connection for your medical care today? Yes  Active parties: Surekha LEES, Mervat Akbar CMA, Neno Altman

## 2022-12-09 ENCOUNTER — CLINICAL SUPPORT (OUTPATIENT)
Dept: INTERNAL MEDICINE | Facility: CLINIC | Age: 42
End: 2022-12-09

## 2022-12-09 DIAGNOSIS — R00.0 TACHYCARDIA: ICD-10-CM

## 2022-12-09 DIAGNOSIS — I10 ESSENTIAL HYPERTENSION: Primary | ICD-10-CM

## 2022-12-09 DIAGNOSIS — R00.0 TACHYCARDIA: Primary | ICD-10-CM

## 2022-12-09 DIAGNOSIS — I10 ESSENTIAL HYPERTENSION: ICD-10-CM

## 2022-12-09 DIAGNOSIS — R00.2 FLUTTERING SENSATION OF HEART: ICD-10-CM

## 2022-12-09 DIAGNOSIS — R23.2 FACIAL FLUSHING: ICD-10-CM

## 2022-12-09 PROCEDURE — 93000 ELECTROCARDIOGRAM COMPLETE: CPT | Performed by: NURSE PRACTITIONER

## 2022-12-09 RX ORDER — METOPROLOL SUCCINATE 50 MG/1
150 TABLET, EXTENDED RELEASE ORAL DAILY
Qty: 45 TABLET | Refills: 1 | Status: SHIPPED | OUTPATIENT
Start: 2022-12-09 | End: 2022-12-28 | Stop reason: SDUPTHER

## 2022-12-09 NOTE — PROGRESS NOTES
MyChart message sent earlier today saying heart fluttering, palpitations have worsened.  Heart fluttering particularly bothersome at bedtime. I advised Neno to come to clinic for an EKG; refused to go to ED or be referred to cardiology at this time.   Diastolic blood pressure has been slightly over normal parameters, typically in the 80s-low 90s. Neno takes irbesartan 300 mg, metoprolol 100 mg daily as prescribed for hypertension.  does not think HCTZ has been effective for BP control.  Takes atomoxetine, bupropion, lamotrigine as prescribed by PMHNP.  Neno ('they') are aware atomoxetine may cause tachycardia; refuses to contemplate stopping medication as they have not been suicidal since starting the medication.  Coming to clinic was very stressful for them today.  We discussed referral to cardiology, politely declined at this time due to anxiety regarding new provider.  Our current plan is for them to continue irbesartan 300 mg, increase metoprolol XL to 150 mg daily.  May start taking amlodipine 5 mg if blood pressure continues to be elevated. Discontinue HCTZ.    Discussed Holter monitor, referral to cardiology as potential plans of care. Neno will continue to monitor blood pressure at home and stay in contact with me via [employer] messages.  Should chest pain, orthopnea, dyspnea, diaphoresis, nausea, excessive fatigue develop, Neno should go to the emergency room.      ECG 12 Lead    Date/Time: 12/9/2022 3:59 PM  Performed by: Laura Jones APRN  Authorized by: Laura Jones APRN   Comparison: compared with previous ECG from 1/1/2021  Similar to previous ECG  Rhythm: sinus rhythm  Rate: normal  BPM: 77  Other findings: left ventricular hypertrophy    Clinical impression: abnormal EKG             Km RN

## 2022-12-10 LAB
ALBUMIN SERPL-MCNC: 4.2 G/DL (ref 3.8–4.8)
ALBUMIN/GLOB SERPL: 1.5 {RATIO} (ref 1.2–2.2)
ALP SERPL-CCNC: 123 IU/L (ref 44–121)
ALT SERPL-CCNC: 8 IU/L (ref 0–32)
AST SERPL-CCNC: 9 IU/L (ref 0–40)
BILIRUB SERPL-MCNC: <0.2 MG/DL (ref 0–1.2)
BUN SERPL-MCNC: 10 MG/DL (ref 6–24)
BUN/CREAT SERPL: 11 (ref 9–23)
CALCIUM SERPL-MCNC: 9.5 MG/DL (ref 8.7–10.2)
CHLORIDE SERPL-SCNC: 97 MMOL/L (ref 96–106)
CO2 SERPL-SCNC: 24 MMOL/L (ref 20–29)
CREAT SERPL-MCNC: 0.87 MG/DL (ref 0.57–1)
EGFRCR SERPLBLD CKD-EPI 2021: 84 ML/MIN/1.73
GLOBULIN SER CALC-MCNC: 2.8 G/DL (ref 1.5–4.5)
GLUCOSE SERPL-MCNC: 165 MG/DL (ref 70–99)
MAGNESIUM SERPL-MCNC: 1.8 MG/DL (ref 1.6–2.3)
POTASSIUM SERPL-SCNC: 4.4 MMOL/L (ref 3.5–5.2)
PROT SERPL-MCNC: 7 G/DL (ref 6–8.5)
SODIUM SERPL-SCNC: 135 MMOL/L (ref 134–144)
T4 FREE SERPL-MCNC: 1.07 NG/DL (ref 0.82–1.77)
THYROPEROXIDASE AB SERPL-ACNC: <9 IU/ML (ref 0–34)
TSH SERPL DL<=0.005 MIU/L-ACNC: 1.41 UIU/ML (ref 0.45–4.5)

## 2022-12-11 ENCOUNTER — PATIENT MESSAGE (OUTPATIENT)
Dept: INTERNAL MEDICINE | Facility: CLINIC | Age: 42
End: 2022-12-11

## 2022-12-12 NOTE — PROGRESS NOTES
Blood glucose is elevated, as it has been in the past.  Alkaline phosphatase very slightly elevated, other liver function tests and liver enzymes are normal.   I'd like a hemoglobin A1c to be certain hyperglycemia isn't contributing to symptoms. I don't think the specimen lab has can be used for that.  Please verify if Neno wants to have test done in lab or in clinic w/fingerstick.

## 2022-12-13 RX ORDER — CLONIDINE HYDROCHLORIDE 0.1 MG/1
0.1 TABLET ORAL 2 TIMES DAILY PRN
Qty: 60 TABLET | Refills: 1 | Status: SHIPPED | OUTPATIENT
Start: 2022-12-13

## 2022-12-13 NOTE — TELEPHONE ENCOUNTER
From: Nicole Altman  To: YOANA Beltran  Sent: 12/11/2022 6:46 PM EST  Subject: bp    PLEASE SEND DIRECTLY TO [facility] OR [facility] (they told me to say this)  Is there anyway I could get a prn bp/ anxiety med?  I'm pretty sure I used to take colonodine prn.   I have vistiral but that just makes me tired- it doesn't really help with the physical symptoms.  This would be really helpful on days like today when i forgot bp meds last night (I thought i did take them) and i cant feel my face or hands from the resulting high blood pressure.  I have gone ahead and taken tonight's meds early but they don't work very fast.  thank you

## 2022-12-28 ENCOUNTER — TELEMEDICINE (OUTPATIENT)
Dept: INTERNAL MEDICINE | Facility: CLINIC | Age: 42
End: 2022-12-28
Payer: MEDICARE

## 2022-12-28 DIAGNOSIS — F31.9 BIPOLAR I DISORDER WITH MIXED FEATURES: Chronic | ICD-10-CM

## 2022-12-28 DIAGNOSIS — G43.009 MIGRAINE WITHOUT AURA AND WITHOUT STATUS MIGRAINOSUS, NOT INTRACTABLE: ICD-10-CM

## 2022-12-28 DIAGNOSIS — I10 ESSENTIAL HYPERTENSION: Primary | ICD-10-CM

## 2022-12-28 DIAGNOSIS — F41.9 ANXIETY: Chronic | ICD-10-CM

## 2022-12-28 PROCEDURE — 99214 OFFICE O/P EST MOD 30 MIN: CPT | Performed by: NURSE PRACTITIONER

## 2022-12-28 RX ORDER — METOPROLOL SUCCINATE 100 MG/1
100 TABLET, EXTENDED RELEASE ORAL DAILY
Qty: 90 TABLET | Refills: 3 | Status: SHIPPED | OUTPATIENT
Start: 2022-12-28

## 2022-12-28 RX ORDER — SUMATRIPTAN 50 MG/1
TABLET, FILM COATED ORAL
Qty: 9 TABLET | Refills: 4 | Status: SHIPPED | OUTPATIENT
Start: 2022-12-28 | End: 2023-03-23

## 2022-12-28 RX ORDER — ONDANSETRON 4 MG/1
4 TABLET, ORALLY DISINTEGRATING ORAL EVERY 8 HOURS PRN
Qty: 20 TABLET | Refills: 2 | Status: SHIPPED | OUTPATIENT
Start: 2022-12-28

## 2022-12-28 NOTE — PROGRESS NOTES
Mode of Visit: Video  Location of patient: home  You have chosen to receive care through a telehealth visit.  Does the patient consent to use a video/audio connection for your medical care today? Yes  The visit included audio and video interaction. No technical issues occurred during this visit.    Date: 2022  Name: Nicole Altman  : 1980         Chief Complaint:   Chief Complaint   Patient presents with   • Hypertension     PT STOPPED MED DUE TO BP BEING OK         HPI:  Nicole Altman is a 42 y.o. female presents for video visit in regard to HTN.  Neno has stopped taking medication for ADHD, prescribed by PMHNP.  Blood pressure has been normal since then; monitors closely at home.  Continues to take metoprolol 100 mg, irbesartan 300 mg daily as prescribed.  No longer taking amlodipine, as BP is well managed without it. Trying to eat a heart healthy diet. Denies chest pain, dyspnea, orthopnea, palpitations, lower extremity edema, confusion, headaches, weakness, visual disturbances.  Feels motivation and mood have significantly decreased since medication discontinued.  Continues to take Wellbutrin 150 mg, lamotrigine 150 mg, Trileptal 300 mg as prescribed.  Was previously working on creative endeavors, has not had desire or energy to work on these projects since stopping medication.  Denies SI, HI, panic attacks.  Continues to have migraine headaches.  Imitrex effective when needed, as is zofran.        History: The following portions of the patient's history were reviewed and updated as appropriate: allergies, current medications, past medical history, family history, surgical history, social history and problem list.        PE:  Physical Exam   Constitutional: She appears well-developed and well-nourished. No distress.   HENT:   Head: Normocephalic.   Right Ear: External ear normal.   Left Ear: External ear normal.   Eyes: Pupils are equal, round, and reactive to light. Conjunctivae are normal.    Neck: Neck normal appearance.  Pulmonary/Chest: Effort normal.   Neurological: She is alert.   Oriented x 3   Skin: No pallor.   Psychiatric: She has a normal mood and affect. Her speech is normal and behavior is normal. Thought content normal. Her affect is normal.          Assessment/Plan:  Diagnoses and all orders for this visit:    1. Essential hypertension (Primary)  -     metoprolol succinate XL (Toprol XL) 100 MG 24 hr tablet; Take 1 tablet by mouth Daily.  Dispense: 90 tablet; Refill: 3        - Follow heart healthy diet.  Keep sodium intake < 1500 mg per day.  Avoid processed & fast foods.          - Exercise as tolerated, with a goal of 30 minutes of moderate exercise most days.         - Take medications as prescribed.    2. Migraine without aura and without status migrainosus, not intractable  -     SUMAtriptan (IMITREX) 50 MG tablet; Take one tablet at onset of headache. May repeat dose one time in 2 hours if headache not relieved.  Indications: Headache  Dispense: 9 tablet; Refill: 4  -     ondansetron ODT (ZOFRAN-ODT) 4 MG disintegrating tablet; Place 1 tablet on the tongue Every 8 (Eight) Hours As Needed for Nausea or Vomiting.  Dispense: 20 tablet; Refill: 2    3. Bipolar I disorder with mixed features (HCC)  4. Anxiety        - Encouraged to take part in daily physical exercise.          - Eat healthy, well balanced diet; avoid sugary foods or beverages        - Continue to abstain from alcohol and drugs        - Ensure good night's sleep by creating calm space in bedroom, avoiding screen time 1-2 hours before bed, no caffeine after 5 pm        - Talk to supportive family and friends, as needed        - Follow-up with [facility] NP as scheduled.        - Continue medications as prescribed.          Return if symptoms worsen or fail to improve.  Patient was given instructions and counseling regarding her condition or for health maintenance advice. Please see specific information pulled into the AVS if  appropriate.

## 2023-01-27 ENCOUNTER — OFFICE VISIT (OUTPATIENT)
Dept: INTERNAL MEDICINE | Facility: CLINIC | Age: 43
End: 2023-01-27
Payer: MEDICARE

## 2023-01-27 VITALS
WEIGHT: 262 LBS | HEIGHT: 66 IN | HEART RATE: 105 BPM | OXYGEN SATURATION: 99 % | BODY MASS INDEX: 42.11 KG/M2 | DIASTOLIC BLOOD PRESSURE: 78 MMHG | SYSTOLIC BLOOD PRESSURE: 130 MMHG | TEMPERATURE: 97.2 F

## 2023-01-27 DIAGNOSIS — F32.81 PREMENSTRUAL DYSPHORIC DISORDER: Chronic | ICD-10-CM

## 2023-01-27 DIAGNOSIS — F31.9 BIPOLAR I DISORDER WITH MIXED FEATURES: Chronic | ICD-10-CM

## 2023-01-27 DIAGNOSIS — F90.0 ATTENTION DEFICIT HYPERACTIVITY DISORDER (ADHD), PREDOMINANTLY INATTENTIVE TYPE: Primary | ICD-10-CM

## 2023-01-27 DIAGNOSIS — F40.11 GENERALIZED SOCIAL PHOBIA: ICD-10-CM

## 2023-01-27 DIAGNOSIS — Z51.81 ENCOUNTER FOR THERAPEUTIC DRUG MONITORING: Primary | ICD-10-CM

## 2023-01-27 DIAGNOSIS — I10 ESSENTIAL HYPERTENSION: Chronic | ICD-10-CM

## 2023-01-27 DIAGNOSIS — F41.9 ANXIETY: Chronic | ICD-10-CM

## 2023-01-27 DIAGNOSIS — F90.9 ATTENTION DEFICIT HYPERACTIVITY DISORDER (ADHD), UNSPECIFIED ADHD TYPE: ICD-10-CM

## 2023-01-27 DIAGNOSIS — F84.0 AUTISM SPECTRUM DISORDER REQUIRING SUPPORT (LEVEL 1): Chronic | ICD-10-CM

## 2023-01-27 PROCEDURE — 99214 OFFICE O/P EST MOD 30 MIN: CPT | Performed by: NURSE PRACTITIONER

## 2023-01-27 RX ORDER — DEXTROAMPHETAMINE SACCHARATE, AMPHETAMINE ASPARTATE MONOHYDRATE, DEXTROAMPHETAMINE SULFATE AND AMPHETAMINE SULFATE 1.25; 1.25; 1.25; 1.25 MG/1; MG/1; MG/1; MG/1
5 CAPSULE, EXTENDED RELEASE ORAL EVERY MORNING
Qty: 30 CAPSULE | Refills: 0 | Status: SHIPPED | OUTPATIENT
Start: 2023-01-27 | End: 2023-02-27 | Stop reason: DRUGHIGH

## 2023-01-27 NOTE — TELEPHONE ENCOUNTER
Neno sees online psych provider.  I've been in communication with Neno and her provider.  I agree a low dose of Adderall would be beneficial for Neno.

## 2023-01-31 ENCOUNTER — PRIOR AUTHORIZATION (OUTPATIENT)
Dept: INTERNAL MEDICINE | Facility: CLINIC | Age: 43
End: 2023-01-31
Payer: MEDICARE

## 2023-02-06 LAB — DRUGS UR: NORMAL

## 2023-02-16 ENCOUNTER — PATIENT MESSAGE (OUTPATIENT)
Dept: INTERNAL MEDICINE | Facility: CLINIC | Age: 43
End: 2023-02-16
Payer: MEDICARE

## 2023-02-16 DIAGNOSIS — F10.21 HISTORY OF ALCOHOL DEPENDENCE: ICD-10-CM

## 2023-02-16 DIAGNOSIS — F31.9 BIPOLAR I DISORDER WITH MIXED FEATURES: ICD-10-CM

## 2023-02-16 DIAGNOSIS — F32.81 PREMENSTRUAL DYSPHORIC DISORDER: ICD-10-CM

## 2023-02-16 DIAGNOSIS — F90.0 ATTENTION DEFICIT HYPERACTIVITY DISORDER (ADHD), PREDOMINANTLY INATTENTIVE TYPE: Primary | ICD-10-CM

## 2023-02-16 DIAGNOSIS — F40.11 GENERALIZED SOCIAL PHOBIA: ICD-10-CM

## 2023-02-16 DIAGNOSIS — F41.9 ANXIETY: ICD-10-CM

## 2023-02-16 DIAGNOSIS — F19.11: ICD-10-CM

## 2023-02-16 DIAGNOSIS — F84.0 AUTISM SPECTRUM DISORDER REQUIRING SUPPORT (LEVEL 1): ICD-10-CM

## 2023-02-27 ENCOUNTER — OFFICE VISIT (OUTPATIENT)
Dept: INTERNAL MEDICINE | Facility: CLINIC | Age: 43
End: 2023-02-27
Payer: MEDICARE

## 2023-02-27 VITALS
BODY MASS INDEX: 42.27 KG/M2 | SYSTOLIC BLOOD PRESSURE: 118 MMHG | WEIGHT: 263 LBS | TEMPERATURE: 98.2 F | OXYGEN SATURATION: 98 % | HEART RATE: 73 BPM | HEIGHT: 66 IN | DIASTOLIC BLOOD PRESSURE: 68 MMHG

## 2023-02-27 DIAGNOSIS — F19.11: ICD-10-CM

## 2023-02-27 DIAGNOSIS — F10.21 HISTORY OF ALCOHOL DEPENDENCE: ICD-10-CM

## 2023-02-27 DIAGNOSIS — F90.0 ATTENTION DEFICIT HYPERACTIVITY DISORDER (ADHD), PREDOMINANTLY INATTENTIVE TYPE: Primary | ICD-10-CM

## 2023-02-27 DIAGNOSIS — E66.01 MORBID (SEVERE) OBESITY DUE TO EXCESS CALORIES: ICD-10-CM

## 2023-02-27 DIAGNOSIS — F84.0 AUTISM SPECTRUM DISORDER REQUIRING SUPPORT (LEVEL 1): Chronic | ICD-10-CM

## 2023-02-27 DIAGNOSIS — F90.0 ATTENTION DEFICIT HYPERACTIVITY DISORDER (ADHD), PREDOMINANTLY INATTENTIVE TYPE: ICD-10-CM

## 2023-02-27 DIAGNOSIS — I10 ESSENTIAL HYPERTENSION: Chronic | ICD-10-CM

## 2023-02-27 DIAGNOSIS — F31.9 BIPOLAR I DISORDER WITH MIXED FEATURES: Chronic | ICD-10-CM

## 2023-02-27 DIAGNOSIS — F41.9 ANXIETY: Chronic | ICD-10-CM

## 2023-02-27 PROCEDURE — 1160F RVW MEDS BY RX/DR IN RCRD: CPT | Performed by: NURSE PRACTITIONER

## 2023-02-27 PROCEDURE — 3074F SYST BP LT 130 MM HG: CPT | Performed by: NURSE PRACTITIONER

## 2023-02-27 PROCEDURE — 3078F DIAST BP <80 MM HG: CPT | Performed by: NURSE PRACTITIONER

## 2023-02-27 PROCEDURE — 1159F MED LIST DOCD IN RCRD: CPT | Performed by: NURSE PRACTITIONER

## 2023-02-27 PROCEDURE — 99214 OFFICE O/P EST MOD 30 MIN: CPT | Performed by: NURSE PRACTITIONER

## 2023-02-27 RX ORDER — DEXTROAMPHETAMINE SACCHARATE, AMPHETAMINE ASPARTATE MONOHYDRATE, DEXTROAMPHETAMINE SULFATE AND AMPHETAMINE SULFATE 2.5; 2.5; 2.5; 2.5 MG/1; MG/1; MG/1; MG/1
10 CAPSULE, EXTENDED RELEASE ORAL EVERY MORNING
Qty: 30 CAPSULE | Refills: 0 | Status: SHIPPED | OUTPATIENT
Start: 2023-02-27 | End: 2023-02-28 | Stop reason: SDUPTHER

## 2023-02-27 RX ORDER — DEXTROAMPHETAMINE SACCHARATE, AMPHETAMINE ASPARTATE MONOHYDRATE, DEXTROAMPHETAMINE SULFATE AND AMPHETAMINE SULFATE 1.25; 1.25; 1.25; 1.25 MG/1; MG/1; MG/1; MG/1
5 CAPSULE, EXTENDED RELEASE ORAL EVERY MORNING
Qty: 30 CAPSULE | Refills: 0 | Status: CANCELLED | OUTPATIENT
Start: 2023-02-27

## 2023-02-27 NOTE — TELEPHONE ENCOUNTER
Neno has asked if dose can be increased to 10 mg daily.  I think this would be well tolerated and improve focus and motivation.

## 2023-02-28 DIAGNOSIS — F90.0 ATTENTION DEFICIT HYPERACTIVITY DISORDER (ADHD), PREDOMINANTLY INATTENTIVE TYPE: ICD-10-CM

## 2023-02-28 RX ORDER — DEXTROAMPHETAMINE SACCHARATE, AMPHETAMINE ASPARTATE MONOHYDRATE, DEXTROAMPHETAMINE SULFATE AND AMPHETAMINE SULFATE 2.5; 2.5; 2.5; 2.5 MG/1; MG/1; MG/1; MG/1
10 CAPSULE, EXTENDED RELEASE ORAL EVERY MORNING
Qty: 30 CAPSULE | Refills: 0 | Status: SHIPPED | OUTPATIENT
Start: 2023-02-28 | End: 2023-03-08 | Stop reason: DRUGHIGH

## 2023-03-08 DIAGNOSIS — F90.0 ATTENTION DEFICIT HYPERACTIVITY DISORDER (ADHD), PREDOMINANTLY INATTENTIVE TYPE: ICD-10-CM

## 2023-03-08 RX ORDER — DEXTROAMPHETAMINE SACCHARATE, AMPHETAMINE ASPARTATE MONOHYDRATE, DEXTROAMPHETAMINE SULFATE AND AMPHETAMINE SULFATE 1.25; 1.25; 1.25; 1.25 MG/1; MG/1; MG/1; MG/1
5 CAPSULE, EXTENDED RELEASE ORAL EVERY MORNING
Qty: 30 CAPSULE | Refills: 0 | Status: SHIPPED | OUTPATIENT
Start: 2023-03-08 | End: 2023-04-02 | Stop reason: SDUPTHER

## 2023-03-22 DIAGNOSIS — G43.009 MIGRAINE WITHOUT AURA AND WITHOUT STATUS MIGRAINOSUS, NOT INTRACTABLE: ICD-10-CM

## 2023-03-23 RX ORDER — PANTOPRAZOLE SODIUM 40 MG/1
40 TABLET, DELAYED RELEASE ORAL NIGHTLY
Qty: 90 TABLET | Refills: 1 | Status: SHIPPED | OUTPATIENT
Start: 2023-03-23

## 2023-03-23 RX ORDER — SUMATRIPTAN 50 MG/1
TABLET, FILM COATED ORAL
Qty: 27 TABLET | Refills: 0 | Status: SHIPPED | OUTPATIENT
Start: 2023-03-23

## 2023-03-24 ENCOUNTER — PATIENT MESSAGE (OUTPATIENT)
Dept: INTERNAL MEDICINE | Facility: CLINIC | Age: 43
End: 2023-03-24
Payer: MEDICARE

## 2023-03-24 DIAGNOSIS — R23.1 PALLOR OF NAIL BED: ICD-10-CM

## 2023-03-24 DIAGNOSIS — R53.83 OTHER FATIGUE: Primary | ICD-10-CM

## 2023-04-02 DIAGNOSIS — F90.0 ATTENTION DEFICIT HYPERACTIVITY DISORDER (ADHD), PREDOMINANTLY INATTENTIVE TYPE: ICD-10-CM

## 2023-04-03 RX ORDER — DEXTROAMPHETAMINE SACCHARATE, AMPHETAMINE ASPARTATE MONOHYDRATE, DEXTROAMPHETAMINE SULFATE AND AMPHETAMINE SULFATE 1.25; 1.25; 1.25; 1.25 MG/1; MG/1; MG/1; MG/1
5 CAPSULE, EXTENDED RELEASE ORAL EVERY MORNING
Qty: 30 CAPSULE | Refills: 0 | Status: SHIPPED | OUTPATIENT
Start: 2023-04-03 | End: 2023-04-13

## 2023-04-03 NOTE — TELEPHONE ENCOUNTER
Rx Refill Note  Requested Prescriptions     Pending Prescriptions Disp Refills   • amphetamine-dextroamphetamine XR (ADDERALL XR) 5 MG 24 hr capsule 30 capsule 0     Sig: Take 1 capsule by mouth Every Morning      Last office visit with prescribing clinician: 2/27/23  Next office visit with prescribing clinician:        UDS: 1/30/23  Please advise as Dr. Lim is out of the office until 4/10 and this patient will be out of medication before then.     Johnathan Melendez MA  04/03/23, 08:53 EDT

## 2023-04-13 ENCOUNTER — OFFICE VISIT (OUTPATIENT)
Dept: BEHAVIORAL HEALTH | Facility: CLINIC | Age: 43
End: 2023-04-13
Payer: MEDICARE

## 2023-04-13 VITALS
WEIGHT: 265 LBS | HEIGHT: 66 IN | SYSTOLIC BLOOD PRESSURE: 124 MMHG | BODY MASS INDEX: 42.59 KG/M2 | DIASTOLIC BLOOD PRESSURE: 76 MMHG

## 2023-04-13 DIAGNOSIS — F39 MOOD DISORDER: ICD-10-CM

## 2023-04-13 DIAGNOSIS — F90.0 ATTENTION DEFICIT HYPERACTIVITY DISORDER (ADHD), PREDOMINANTLY INATTENTIVE TYPE: Primary | ICD-10-CM

## 2023-04-13 PROCEDURE — 99214 OFFICE O/P EST MOD 30 MIN: CPT

## 2023-04-13 PROCEDURE — 1160F RVW MEDS BY RX/DR IN RCRD: CPT

## 2023-04-13 PROCEDURE — 1159F MED LIST DOCD IN RCRD: CPT

## 2023-04-13 PROCEDURE — 3074F SYST BP LT 130 MM HG: CPT

## 2023-04-13 PROCEDURE — 3078F DIAST BP <80 MM HG: CPT

## 2023-04-13 RX ORDER — ATOMOXETINE 100 MG/1
100 CAPSULE ORAL DAILY
Qty: 90 CAPSULE | Refills: 0 | Status: SHIPPED | OUTPATIENT
Start: 2023-04-13

## 2023-04-13 RX ORDER — DEXTROAMPHETAMINE SACCHARATE, AMPHETAMINE ASPARTATE MONOHYDRATE, DEXTROAMPHETAMINE SULFATE AND AMPHETAMINE SULFATE 1.25; 1.25; 1.25; 1.25 MG/1; MG/1; MG/1; MG/1
5 CAPSULE, EXTENDED RELEASE ORAL EVERY MORNING
Qty: 30 CAPSULE | Refills: 0 | Status: SHIPPED | OUTPATIENT
Start: 2023-04-13

## 2023-04-13 RX ORDER — DEXTROAMPHETAMINE SACCHARATE, AMPHETAMINE ASPARTATE, DEXTROAMPHETAMINE SULFATE AND AMPHETAMINE SULFATE 1.25; 1.25; 1.25; 1.25 MG/1; MG/1; MG/1; MG/1
2.5 TABLET ORAL DAILY
Qty: 15 TABLET | Refills: 0 | Status: SHIPPED | OUTPATIENT
Start: 2023-04-13

## 2023-04-13 RX ORDER — BUPROPION HYDROCHLORIDE 150 MG/1
150 TABLET ORAL EVERY MORNING
Qty: 90 TABLET | Refills: 0 | Status: SHIPPED | OUTPATIENT
Start: 2023-04-13

## 2023-04-13 RX ORDER — LAMOTRIGINE 150 MG/1
150 TABLET ORAL 2 TIMES DAILY
Qty: 180 TABLET | Refills: 0 | Status: SHIPPED | OUTPATIENT
Start: 2023-04-13

## 2023-04-13 RX ORDER — OXCARBAZEPINE 150 MG/1
150 TABLET, FILM COATED ORAL DAILY
Qty: 90 TABLET | Refills: 0 | Status: SHIPPED | OUTPATIENT
Start: 2023-04-13

## 2023-04-13 NOTE — PROGRESS NOTES
"  New Patient Office Visit    Patient Name: Nicole Altman  : 1980   MRN: [id number]   Care Team: Patient Care Team:  Laura Jones APRN as PCP - General (Family Medicine)  Jeffrey Schaffer MD as Consulting Physician (General Surgery)  Tracy Her APRN as Nurse Practitioner (Behavioral Health)        Chief Complaint:    Chief Complaint   Patient presents with   • ADHD   • Mood Disorder   • Med Management       History of Present Illness: Nicole Altman is a 42 y.o. adult who is here today to establish care. Patient endorses a long history of struggles with her mental healthy. She reports struggling all of her life and states she started receiving treatment as a child. She states she was diagnosed with Bipolar by \"everyone\" however she does not know if she agrees with that diagnosis. She states she had a history of constant suicidal thoughts and hearing voices. She tried and failed numerous different medications and felt that nothing helps. Most recently, she was started on Adderall and Strattera and the Adderall helped with her voices and the Strattera took away the suicidal thoughts.  She denies any type of manic behavior that wasn't;t induced by medication. She has a history of alcohol use disorder in which she went to treatment at the [facility] for 16 months and is now [facility]. Overall patient feels that the Adderall XR has been very helpful. She does endorse it wearing off early in the afternoon and when this happens, she gets a terrible headache that is hard to deal with.     The following portion of the patient's history were reviewed and updated appropriately: allergies, current and past medications, family history, medical history and social history.  Subjective   Review of Systems:    Review of Systems   Psychiatric/Behavioral: Positive for decreased concentration, hallucinations (history, denies today ), depressed mood and stress. The patient is " "nervous/anxious.    All other systems reviewed and are negative.      PSYCHIATRIC HISTORY   Current Psychiatric Medications:  Adderall XR 5 mg  Strattera 100  Wellbutrin  mg  Lamictal 150 BID  Trileptal 300 mg BID   Clonidine PRN   Vistaril PRN  Prior Psychiatric Medications:  \"Everything\"   Currently in Counseling or Therapy:  Yes, [facility], once a week   Prior Psychiatric Outpatient Care:  Dr. Sabino lobato [facility]  Prior Psychiatric Hospitalizations:  Yes, \"everywhere\"  Most recently: [facility]  Previous Suicide Attempts:  Yes, when younger   Previous Self-Harming Behavior:  Nonw  History of Seizures or TBI:  None   Abuse History:  Verbal: no  Emotional: no  Mental: no  Physical: no  Sexual: no  Rape: no        Family Psychiatric History:  None known   Any family history of suicide attempts:  No       Substance Abuse History:  Alcohol: no  Smoking/Cigarettes: no  Vaping: no  Smokeless Tobacco: no  Illicit Substances: no  Prescription Medication Misuse: no    Social History:  Born: [place], [place]   Raised: [place] / [place]   Currently resides in [place], [place]   Children: no   Lives with: The patient's currently household consists of the patient  Highest Level of Education: AD  Employment: Disability    History: no   Legal History, Arrests, or Incarcerations: No current legal charges pending.  Patient's Support Network Includes:  A LOT: friends, neighbors, sponsors, rehab           Current Medications:   Current Outpatient Medications   Medication Sig Dispense Refill   • albuterol (PROVENTIL) (2.5 MG/3ML) 0.083% nebulizer solution Take 2.5 mg by nebulization Every 4 (Four) Hours As Needed for Wheezing. 25 each 0   • amphetamine-dextroamphetamine XR (ADDERALL XR) 5 MG 24 hr capsule Take 1 capsule by mouth Every Morning 30 capsule 0   • atomoxetine (STRATTERA) 100 MG capsule Take 1 capsule by mouth Daily. 90 capsule 0   • buPROPion XL (Wellbutrin XL) 150 MG 24 hr tablet " Take 1 tablet by mouth Every Morning. 90 tablet 0   • cloNIDine (Catapres) 0.1 MG tablet Take 1 tablet by mouth 2 (Two) Times a Day As Needed for High Blood Pressure. 60 tablet 1   • irbesartan (AVAPRO) 300 MG tablet Take 1 tablet by mouth every night at bedtime. 90 tablet 3   • lamoTRIgine (LaMICtal) 150 MG tablet Take 1 tablet by mouth 2 (Two) Times a Day. 180 tablet 0   • loperamide (IMODIUM) 2 MG capsule Take 1 capsule by mouth 4 (Four) Times a Day As Needed for Diarrhea. (Patient taking differently: Take 1 capsule by mouth As Needed for Diarrhea.) 12 capsule 0   • metoprolol succinate XL (Toprol XL) 100 MG 24 hr tablet Take 1 tablet by mouth Daily. 90 tablet 3   • Nebulizers (Compressor/Nebulizer) misc 1 each Every 6 (Six) Hours As Needed (shortness of breath). 1 each 0   • ondansetron ODT (ZOFRAN-ODT) 4 MG disintegrating tablet Place 1 tablet on the tongue Every 8 (Eight) Hours As Needed for Nausea or Vomiting. 20 tablet 2   • pantoprazole (PROTONIX) 40 MG EC tablet TAKE 1 TABLET BY MOUTH EVERY NIGHT. INDICATIONS: GASTROESOPHAGEAL REFLUX DISEASE 90 tablet 1   • SUMAtriptan (IMITREX) 50 MG tablet TAKE 1 TABLET AT ONSET OF HEADACHE. MAY REPEAT DOSE ONE TIME IN 2 HOURS IF HEADACHE NOT RELIEVED 27 tablet 0   • amphetamine-dextroamphetamine (Adderall) 5 MG tablet Take 0.5 tablets by mouth Daily. 15 tablet 0   • OXcarbazepine (Trileptal) 150 MG tablet Take 1 tablet by mouth Daily. 90 tablet 0     No current facility-administered medications for this visit.       Mental Status Exam:   Hygiene:   good  Cooperation:  Cooperative  Eye Contact:  Good  Psychomotor Behavior:  Appropriate  Affect:  Appropriate  Mood: sad, anxious and panicky  Speech:  Normal  Thought Process:  Goal directed and Linear  Thought Content:  Normal and Mood congruent  Suicidal:  None  Homicidal:  None  Hallucinations:  None  Delusion:  None  Memory:  Intact  Orientation:  Person, Place, Time and Situation  Reliability:  fair  Insight:   "Fair  Judgement:  Fair  Impulse Control:  Fair  Physical/Medical Issues:  Yes see chart     Objective   Vital Signs:   /76   Ht 167.6 cm (66\")   Wt 120 kg (265 lb)   BMI 42.77 kg/m²       Assessment / Plan    Diagnoses and all orders for this visit:    1. Attention deficit hyperactivity disorder (ADHD), predominantly inattentive type (Primary)  -     amphetamine-dextroamphetamine (Adderall) 5 MG tablet; Take 0.5 tablets by mouth Daily.  Dispense: 15 tablet; Refill: 0  -     amphetamine-dextroamphetamine XR (ADDERALL XR) 5 MG 24 hr capsule; Take 1 capsule by mouth Every Morning  Dispense: 30 capsule; Refill: 0  -     atomoxetine (STRATTERA) 100 MG capsule; Take 1 capsule by mouth Daily.  Dispense: 90 capsule; Refill: 0    2. Mood disorder  -     buPROPion XL (Wellbutrin XL) 150 MG 24 hr tablet; Take 1 tablet by mouth Every Morning.  Dispense: 90 tablet; Refill: 0  -     lamoTRIgine (LaMICtal) 150 MG tablet; Take 1 tablet by mouth 2 (Two) Times a Day.  Dispense: 180 tablet; Refill: 0  -     OXcarbazepine (Trileptal) 150 MG tablet; Take 1 tablet by mouth Daily.  Dispense: 90 tablet; Refill: 0       Will add an afternoon booster of Adderall to help her finish her day. Will also work toward weaning off some medication. Will start by decrease Trileptal to once a day. Continue all other medication as ordered.     A psychological evaluation was conducted in order to assess past and current level of functioning. Areas assessed included, but were not limited to: perception of social support, perception of ability to face and deal with challenges in life (positive functioning), anxiety symptoms, depressive symptoms, perspective on beliefs/belief system, coping skills for stress, intelligence level,  Therapeutic rapport was established. Interventions conducted today were geared towards incorporating medication management along with support for continued therapy. Education was also provided as to the med management with " this provider and what to expect in subsequent sessions.      We discussed risks, benefits, goals and side effects of the above medication and the patient was agreeable with the plan. Patient was educated on the importance of compliance with treatment and follow-up appointments. Patient is aware to contact the [facility] with any worsening of symptoms. To call for questions or concerns and return early if necessary. Patent is agreeable to go to the Emergency Department or call 911 should they begin SI/HI.    PHQ-2/PHQ-9: Depression Screening  Little Interest or Pleasure in Doing Things: 0-->not at all  Feeling Down, Depressed or Hopeless: 0-->not at all  PHQ-2 Total Score: 0  PHQ-9: Brief Depression Severity Measure Score: 0      PHQ-9 Score:   PHQ-9 Total Score: 0    Depression Screening:  Patient screened positive for depression based on a PHQ-9 score of 0 on 4/13/2023. Follow-up recommendations include: Prescribed antidepressant medication treatment and Suicide Risk Assessment performed.      LUCIO-7 Score:  Feeling nervous, anxious or on edge: Nearly every day  Not being able to stop or control worrying: Not at all  Worrying too much about different things: Not at all  Trouble Relaxing: Not at all  Being so restless that it is hard to sit still: Not at all  Feeling afraid as if something awful might happen: Not at all  Becoming easily annoyed or irritable: Nearly every day  LUCIO 7 Total Score: 6       Screening for Adults With ADHD - (1-6)  5. How often do you fidget or squirm with your hands or feet when you have to sit down for a long time?: Never  6. How often do you feel overly active and compelled to do things, like you were driven by a motor?: Never  10.How often do you misplace or have difficulty finding things at home or at work?: Rarely  11.How often are you distracted by activity or noise around you?: Often  12.How often do you leave your seat in meetings or other situations in which you are expected  "to remain seated?: Sometimes  13.How often do you feel restless or fidgety?: Never  14.How often do you have difficulty unwinding and relaxing when you have time to yourself?: Never  15.How often do you find yourself talking too much when you are in social situations?: Never  16.When you’re in a conversation, how often do you find yourself finishing the sentences of the people you are talking to, before they can finish them themselves?: Never  17.How often do you have difficulty waiting your turn in situations when turn taking is required?: Sometimes  18.How often do you interrupt others when they are busy?: Rarely    MEDS ORDERED DURING VISIT:  New Medications Ordered This Visit   Medications   • amphetamine-dextroamphetamine (Adderall) 5 MG tablet     Sig: Take 0.5 tablets by mouth Daily.     Dispense:  15 tablet     Refill:  0   • amphetamine-dextroamphetamine XR (ADDERALL XR) 5 MG 24 hr capsule     Sig: Take 1 capsule by mouth Every Morning     Dispense:  30 capsule     Refill:  0   • atomoxetine (STRATTERA) 100 MG capsule     Sig: Take 1 capsule by mouth Daily.     Dispense:  90 capsule     Refill:  0   • buPROPion XL (Wellbutrin XL) 150 MG 24 hr tablet     Sig: Take 1 tablet by mouth Every Morning.     Dispense:  90 tablet     Refill:  0   • lamoTRIgine (LaMICtal) 150 MG tablet     Sig: Take 1 tablet by mouth 2 (Two) Times a Day.     Dispense:  180 tablet     Refill:  0   • OXcarbazepine (Trileptal) 150 MG tablet     Sig: Take 1 tablet by mouth Daily.     Dispense:  90 tablet     Refill:  0         Follow Up   Return in about 4 weeks (around 5/11/2023).  Patient was given instructions and counseling regarding Nicole Altman \"Sy\"'s condition or for health maintenance advice. Please see specific information pulled into the AVS if appropriate.     TREATMENT PLAN/GOALS: Continue supportive psychotherapy efforts and medications as indicated. Treatment and medication options discussed during today's visit. " Patient acknowledged and verbally consented to continue with current treatment plan and was educated on the importance of compliance with treatment and follow-up appointments.    MEDICATION ISSUES:  Discussed medication options and treatment plan of prescribed medication as well as the risks, benefits, and side effects including potential falls, possible impaired driving and metabolic adversities among others. Patient is agreeable to call the office with any worsening of symptoms or onset of side effects. Patient is agreeable to call 911 or go to the nearest ER should he/she begin having SI/HI.        YOANA Jones [facility] [address] [address]  [phone]     April 13, 2023 12:37 EDT

## 2023-04-21 ENCOUNTER — TELEMEDICINE (OUTPATIENT)
Dept: INTERNAL MEDICINE | Facility: CLINIC | Age: 43
End: 2023-04-21
Payer: MEDICARE

## 2023-04-21 DIAGNOSIS — F10.21 HISTORY OF ALCOHOL DEPENDENCE: ICD-10-CM

## 2023-04-21 DIAGNOSIS — R60.0 LOWER EXTREMITY EDEMA: Primary | ICD-10-CM

## 2023-04-21 DIAGNOSIS — F19.11: ICD-10-CM

## 2023-04-21 DIAGNOSIS — I10 ESSENTIAL HYPERTENSION: Chronic | ICD-10-CM

## 2023-04-21 DIAGNOSIS — F31.9 BIPOLAR I DISORDER WITH MIXED FEATURES: Chronic | ICD-10-CM

## 2023-04-21 NOTE — PROGRESS NOTES
Mode of Visit: Video  Location of patient: home  You have chosen to receive care through a telehealth visit.  Does the patient consent to use a video/audio connection for your medical care today? Yes  The visit included audio and video interaction. No technical issues occurred during this visit.    Date: 2023  Name: Nicole Altman  : 1980         Chief Complaint:   Chief Complaint   Patient presents with   • Edema         HPI:  Nicole Altman is a 42 y.o. adult presents for video visit in regard to edema in feet and ankles noted yesterday after she visited a friend.  Walked on different terrain.  Edema has resolved today.  Denies SOA, cough, nausea, swelling elsewhere.  Ate [employer] spaghetti and one breadstick.  Does not add salt to food. Doesn't like salt, in general, but admits to eating an unhealthy diet.    HTN: takes irbesartan, metoprolol, clonidine as prescribed.  Monitors BP regularly at home. Denies chest pain, dyspnea, orthopnea, palpitations, confusion, headaches, weakness, visual disturbances.  [facility].  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  Weaning off trintellix, going well.  Was prescribed Adderall XR, added to Adderall 5 mg in the am and atomoxetine daily.  Did not like side effects of immediate release Adderall.  Felt chest heaviness/pressure, pressure to get up and do something which seems to have increased anxiety. Has stopped taking it without adverse effects. Continues to take Adderall XR, atomoxetine, bupropion, lamotrigine, trileptal.  Feels mood is well managed.  Neno has begun to engage in creative endeavors again.  Denies anhedonia, carlos manuel, SI, HI, panic attacks, weight change. Neno does have a history of both drug and alcohol abuse; not drinking alcohol or using illicit drugs at this time.       History: The following portions of the patient's history were reviewed and updated as appropriate:  "allergies, current medications, past medical history, family history, surgical history, social history and problem list.        PE:  Physical Exam   Constitutional: Nicole Swanson"Sy\" appears well-developed and well-nourished. No distress.   HENT:   Head: Normocephalic.   Right Ear: External ear normal.   Left Ear: External ear normal.   Eyes: Pupils are equal, round, and reactive to light. Conjunctivae are normal.   Neck: Neck normal appearance.  Pulmonary/Chest: Effort normal.   Neurological: Nicole Altman \"Sy\" is alert.   Oriented x 3   Skin: No pallor.   Psychiatric: Nicole Altman \"Sy\" has a normal mood and affect. Nicole Swanson"Sy\"'s speech is normal and behavior is normal. Thought content normal. Nicole Altman \"Sy\"'s affect is normal.          Assessment/Plan:  Diagnoses and all orders for this visit:    1. Lower extremity edema (Primary)        - Neno is aware swelling is likely from extended walking.  Will keep legs/feet elevated when sitting.  Consider wearing compression stockings/hose when walking or standing for long periods of time.     2. Essential hypertension        - Follow heart healthy diet.  Keep sodium intake < 1500 mg per day.  Avoid processed & fast foods.          - Exercise as tolerated, with a goal of 30 minutes of moderate exercise most days.         - Take medications as prescribed.    3. Bipolar I disorder with mixed features  4. History of alcohol dependence  5. History of psychoactive drug abuse ([facility])        - Encouraged to take part in daily physical exercise.          - Eat healthy, well balanced diet; avoid sugary foods or beverages        - Continue abstaining from alcohol and drugs         - Ensure good night's sleep by creating calm space in bedroom, avoiding screen time 1-2 hours before bed, no caffeine after 5 pm        - Talk to supportive family and friends, as needed        - Continue journaling, other creative way to " express feelings        Return for Next scheduled follow up.  Patient was given instructions and counseling regarding her condition or for health maintenance advice. Please see specific information pulled into the AVS if appropriate.

## 2023-05-03 DIAGNOSIS — F90.0 ATTENTION DEFICIT HYPERACTIVITY DISORDER (ADHD), PREDOMINANTLY INATTENTIVE TYPE: ICD-10-CM

## 2023-05-03 RX ORDER — DEXTROAMPHETAMINE SACCHARATE, AMPHETAMINE ASPARTATE MONOHYDRATE, DEXTROAMPHETAMINE SULFATE AND AMPHETAMINE SULFATE 1.25; 1.25; 1.25; 1.25 MG/1; MG/1; MG/1; MG/1
5 CAPSULE, EXTENDED RELEASE ORAL EVERY MORNING
Qty: 30 CAPSULE | Refills: 0 | Status: SHIPPED | OUTPATIENT
Start: 2023-05-03 | End: 2023-05-08 | Stop reason: SDUPTHER

## 2023-05-03 NOTE — TELEPHONE ENCOUNTER
Rx Refill Note  Requested Prescriptions     Pending Prescriptions Disp Refills   • amphetamine-dextroamphetamine XR (ADDERALL XR) 5 MG 24 hr capsule 30 capsule 0     Sig: Take 1 capsule by mouth Every Morning      Last office visit with prescribing clinician: 4/13/2023   Last telemedicine visit with prescribing clinician: 5/11/2023   Next office visit with prescribing clinician: 5/11/2023                         Would you like a call back once the refill request has been completed: [] Yes [] No    If the office needs to give you a call back, can they leave a voicemail: [] Yes [] No    Jyoti Santo MA  05/03/23, 14:35 EDT

## 2023-05-04 NOTE — PATIENT INSTRUCTIONS
1. Antireflux measures: Avoid fried, fatty foods, alcohol, chocolate, coffee, tea,  soft drinks, peppermint and spearmint, spicy foods, tomatoes and tomato based foods, onion based foods, and smoking. Other antireflux measures include weight reduction if overweight, avoiding tight clothing around the abdomen, elevating the head of the bed 6 inches with blocks under the head board, and don't drink or eat before going to bed and avoid lying down immediately after meals.  2. Ranitidine 150 mg 1 po twice a day.  3. Complete Cipro and Flagyl as prescribed by emergency room.  4. Avoid laxatives, enemas for next 5 days. However, for constipation the patient may use stool softeners.  5. Patient may take probiotics while taking antibiotics, and continue for an additional 1-2 weeks.  6. Low fiber diet for the next 5 days, then resume high fiber diet.   7. Colonoscopy: Description of the procedure, risks, benefits, alternatives and options, including nonoperative options, were discussed with the patient in detail. The patient understands and wishes to proceed, although it may be advisable to wait 3-4 weeks to schedule procedure.  8. The patient is to call in 1 week with update.  
Agitation

## 2023-05-05 NOTE — TELEPHONE ENCOUNTER
[facility] is out of adderall.  Patient is asking for it to be sent to [facility] in [place].  Please advise.  Phone number verified.

## 2023-05-08 DIAGNOSIS — F90.0 ATTENTION DEFICIT HYPERACTIVITY DISORDER (ADHD), PREDOMINANTLY INATTENTIVE TYPE: ICD-10-CM

## 2023-05-08 RX ORDER — DEXTROAMPHETAMINE SACCHARATE, AMPHETAMINE ASPARTATE MONOHYDRATE, DEXTROAMPHETAMINE SULFATE AND AMPHETAMINE SULFATE 1.25; 1.25; 1.25; 1.25 MG/1; MG/1; MG/1; MG/1
5 CAPSULE, EXTENDED RELEASE ORAL EVERY MORNING
Qty: 30 CAPSULE | Refills: 0 | Status: SHIPPED | OUTPATIENT
Start: 2023-05-08

## 2023-05-11 ENCOUNTER — OFFICE VISIT (OUTPATIENT)
Dept: BEHAVIORAL HEALTH | Facility: CLINIC | Age: 43
End: 2023-05-11
Payer: MEDICARE

## 2023-05-11 DIAGNOSIS — F90.0 ATTENTION DEFICIT HYPERACTIVITY DISORDER (ADHD), PREDOMINANTLY INATTENTIVE TYPE: Primary | ICD-10-CM

## 2023-05-11 DIAGNOSIS — Z51.81 ENCOUNTER FOR THERAPEUTIC DRUG MONITORING: ICD-10-CM

## 2023-05-11 DIAGNOSIS — F39 MOOD DISORDER: ICD-10-CM

## 2023-05-11 NOTE — PROGRESS NOTES
Follow Up Office Visit    Patient Name: Nicole Altman  : 1980   MRN: [id number]   Care Team: Patient Care Team:  Laura Jones APRN as PCP - General (Family Medicine)  Jeffrey Schaffer MD as Consulting Physician (General Surgery)  Tracy Her APRN as Nurse Practitioner (Behavioral Health)        Chief Complaint:    Chief Complaint   Patient presents with   • ADHD   • Mood Disorder   • Med Management       History of Present Illness: Nicole Altman is a 42 y.o. adult who is here today for a medication management follow up. Patient reports that she did not like the immediate release Adderall so she stopped taking it. She does feel that the Adderall XR continues to be effective. She denies any adverse reactions decreasing the Trileptal and is ready to decrease the Lamictal. She states she doesn't;t want to change her PM medications and alin like to just work on decreasing her morning medication. She feels that overall her mood is doing well and her focus and concentration are good as well. 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 She denies SI/HI today.     The following portion of the patient's history were reviewed and updated appropriately: allergies, current and past medications, family history, medical history and social history.  Subjective   Review of Systems:    Review of Systems   Psychiatric/Behavioral: The patient is nervous/anxious.    All other systems reviewed and are negative.      Current Medications:   Current Outpatient Medications   Medication Sig Dispense Refill   • albuterol (PROVENTIL) (2.5 MG/3ML) 0.083% nebulizer solution Take 2.5 mg by nebulization Every 4 (Four) Hours As Needed for Wheezing. 25 each 0   • amphetamine-dextroamphetamine XR (ADDERALL XR) 5 MG 24 hr capsule Take 1 capsule by mouth Every Morning 30 capsule 0   • atomoxetine (STRATTERA) 100 MG capsule Take 1 capsule by mouth Daily. 90 capsule 0   •  buPROPion XL (Wellbutrin XL) 150 MG 24 hr tablet Take 1 tablet by mouth Every Morning. 90 tablet 0   • cloNIDine (Catapres) 0.1 MG tablet Take 1 tablet by mouth 2 (Two) Times a Day As Needed for High Blood Pressure. 60 tablet 1   • irbesartan (AVAPRO) 300 MG tablet Take 1 tablet by mouth every night at bedtime. 90 tablet 3   • lamoTRIgine (LaMICtal) 150 MG tablet Take 1 tablet by mouth 2 (Two) Times a Day. 180 tablet 0   • loperamide (IMODIUM) 2 MG capsule Take 1 capsule by mouth 4 (Four) Times a Day As Needed for Diarrhea. (Patient taking differently: Take 1 capsule by mouth As Needed for Diarrhea.) 12 capsule 0   • metoprolol succinate XL (Toprol XL) 100 MG 24 hr tablet Take 1 tablet by mouth Daily. 90 tablet 3   • Nebulizers (Compressor/Nebulizer) misc 1 each Every 6 (Six) Hours As Needed (shortness of breath). 1 each 0   • ondansetron ODT (ZOFRAN-ODT) 4 MG disintegrating tablet Place 1 tablet on the tongue Every 8 (Eight) Hours As Needed for Nausea or Vomiting. 20 tablet 2   • OXcarbazepine (Trileptal) 150 MG tablet Take 1 tablet by mouth Daily. 90 tablet 0   • pantoprazole (PROTONIX) 40 MG EC tablet TAKE 1 TABLET BY MOUTH EVERY NIGHT. INDICATIONS: GASTROESOPHAGEAL REFLUX DISEASE 90 tablet 1   • SUMAtriptan (IMITREX) 50 MG tablet TAKE 1 TABLET AT ONSET OF HEADACHE. MAY REPEAT DOSE ONE TIME IN 2 HOURS IF HEADACHE NOT RELIEVED 27 tablet 0     No current facility-administered medications for this visit.       Mental Status Exam:   Hygiene:   good  Cooperation:  Cooperative  Eye Contact:  Good  Psychomotor Behavior:  Appropriate  Affect:  Appropriate  Mood: normal and anxious  Speech:  Normal  Thought Process:  Goal directed and Linear  Thought Content:  Normal and Mood congruent  Suicidal:  None  Homicidal:  None  Hallucinations:  None  Delusion:  None  Memory:  Intact  Orientation:  Person, Place, Time and Situation  Reliability:  good  Insight:  Good  Judgement:  Good  Impulse Control:  Good  Physical/Medical  Issues:  Yes see chart     Objective   Vital Signs:   There were no vitals taken for this visit.      Assessment / Plan    Diagnoses and all orders for this visit:    1. Attention deficit hyperactivity disorder (ADHD), predominantly inattentive type (Primary)   - Continue Adderall XR 5 mg   - Continue Strattera 100 mg  2. Encounter for therapeutic drug monitoring  -     Compliance Drug Analysis, Ur - Urine, Clean Catch; Future    3. Mood disorder    - Continue Trileptal daily   - Decrease Lamictal to daily   - Continue Wellbutrin daily       Will decrease Lamictal to once a day and continue all other medication as ordered.     A psychological evaluation was conducted in order to assess past and current level of functioning. Areas assessed included, but were not limited to: perception of social support, perception of ability to face and deal with challenges in life (positive functioning), anxiety symptoms, depressive symptoms, perspective on beliefs/belief system, coping skills for stress, intelligence level,  Therapeutic rapport was established. Interventions conducted today were geared towards incorporating medication management along with support for continued therapy. Education was also provided as to the med management with this provider and what to expect in subsequent sessions.      We discussed risks, benefits, goals and side effects of the above medication and the patient was agreeable with the plan. Patient was educated on the importance of compliance with treatment and follow-up appointments. Patient is aware to contact the [facility] with any worsening of symptoms. To call for questions or concerns and return early if necessary. Patent is agreeable to go to the Emergency Department or call 911 should they begin SI/HI.      PHQ-2/PHQ-9: Depression Screening  Little Interest or Pleasure in Doing Things: 0-->not at all  Feeling Down, Depressed or Hopeless: 0-->not at all  PHQ-2 Total Score: 0  Trouble  Falling or Staying Asleep, or Sleeping Too Much: 0-->not at all  Feeling Tired or Having Little Energy: 0-->not at all  Poor Appetite or Overeatin-->not at all  Feeling Bad about Yourself - or that You are a Failure or Have Let Yourself or Your Family Down: 0-->not at all  Trouble Concentrating on Things, Such as Reading the Newspaper or Watching Television: 0-->not at all  Moving or Speaking So Slowly that Other People Could Have Noticed? Or the Opposite - Being So Fidgety: 0-->not at all  Thoughts that You Would be Better Off Dead or of Hurting Yourself in Some Way: 0-->not at all  PHQ-9: Brief Depression Severity Measure Score: 0      PHQ-9 Score:   PHQ-9 Total Score: 0        Over the last two weeks, how often have you been bothered by the following problems?  Feeling nervous, anxious or on edge: Nearly every day  Not being able to stop or control worrying: Nearly every day  Worrying too much about different things: Nearly every day  Trouble Relaxing: Nearly every day  Being so restless that it is hard to sit still: Not at all  Becoming easily annoyed or irritable: Nearly every day  Feeling afraid as if something awful might happen: Nearly every day  LUCIO 7 Total Score: 18  If you checked any problems, how difficult have these problems made it for you to do your work, take care of things at home, or get along with other people: Somewhat difficult        Screening for Adults With ADHD - (1-6)  4. When you have a task that requires a lot of thought, how often do you avoid or delay getting started ?: Very Often  5. How often do you fidget or squirm with your hands or feet when you have to sit down for a long time?: Rarely  6. How often do you feel overly active and compelled to do things, like you were driven by a motor?: Never  8. How often do have difficulty keeping your attention when you are doing boring or repetitive work?: Very Often  9. How often do you have difficulty concentrating on what people say to  "you, even when they are speaking to you: Very Often  10.How often do you misplace or have difficulty finding things at home or at work?: Never  11.How often are you distracted by activity or noise around you?: Very Often  12.How often do you leave your seat in meetings or other situations in which you are expected to remain seated?: Rarely  13.How often do you feel restless or fidgety?: Never  14.How often do you have difficulty unwinding and relaxing when you have time to yourself?: Never  15.How often do you find yourself talking too much when you are in social situations?: Never  16.When you’re in a conversation, how often do you find yourself finishing the sentences of the people you are talking to, before they can finish them themselves?: Never  17.How often do you have difficulty waiting your turn in situations when turn taking is required?: Never  18.How often do you interrupt others when they are busy?: Rarely        MEDS ORDERED DURING VISIT:  No orders of the defined types were placed in this encounter.        Follow Up   Return in about 6 weeks (around 6/22/2023).  Patient was given instructions and counseling regarding Nicole NAHED Altman \"Sy\"'s condition or for health maintenance advice. Please see specific information pulled into the AVS if appropriate.     TREATMENT PLAN/GOALS: Continue supportive psychotherapy efforts and medications as indicated. Treatment and medication options discussed during today's visit. Patient acknowledged and verbally consented to continue with current treatment plan and was educated on the importance of compliance with treatment and follow-up appointments.    MEDICATION ISSUES:  Discussed medication options and treatment plan of prescribed medication as well as the risks, benefits, and side effects including potential falls, possible impaired driving and metabolic adversities among others. Patient is agreeable to call the office with any worsening of symptoms or onset " of side effects. Patient is agreeable to call 911 or go to the nearest ER should he/she begin having SI/HI.        [facility] [address] [address]  [phone]    May 15, 2023 10:06 EDT

## 2023-05-19 LAB — DRUGS UR: NORMAL

## 2023-06-04 DIAGNOSIS — F90.0 ATTENTION DEFICIT HYPERACTIVITY DISORDER (ADHD), PREDOMINANTLY INATTENTIVE TYPE: ICD-10-CM

## 2023-06-05 ENCOUNTER — TELEPHONE (OUTPATIENT)
Dept: BEHAVIORAL HEALTH | Facility: CLINIC | Age: 43
End: 2023-06-05

## 2023-06-05 DIAGNOSIS — F90.0 ATTENTION DEFICIT HYPERACTIVITY DISORDER (ADHD), PREDOMINANTLY INATTENTIVE TYPE: ICD-10-CM

## 2023-06-05 RX ORDER — DEXTROAMPHETAMINE SACCHARATE, AMPHETAMINE ASPARTATE MONOHYDRATE, DEXTROAMPHETAMINE SULFATE AND AMPHETAMINE SULFATE 1.25; 1.25; 1.25; 1.25 MG/1; MG/1; MG/1; MG/1
5 CAPSULE, EXTENDED RELEASE ORAL EVERY MORNING
Qty: 30 CAPSULE | Refills: 0 | OUTPATIENT
Start: 2023-06-05

## 2023-06-05 RX ORDER — DEXTROAMPHETAMINE SACCHARATE, AMPHETAMINE ASPARTATE MONOHYDRATE, DEXTROAMPHETAMINE SULFATE AND AMPHETAMINE SULFATE 1.25; 1.25; 1.25; 1.25 MG/1; MG/1; MG/1; MG/1
5 CAPSULE, EXTENDED RELEASE ORAL EVERY MORNING
Qty: 30 CAPSULE | Refills: 0 | Status: SHIPPED | OUTPATIENT
Start: 2023-06-05

## 2023-06-05 NOTE — TELEPHONE ENCOUNTER
Rx Refill Note  Requested Prescriptions     Pending Prescriptions Disp Refills    amphetamine-dextroamphetamine XR (ADDERALL XR) 5 MG 24 hr capsule 30 capsule 0     Sig: Take 1 capsule by mouth Every Morning      Last office visit with prescribing clinician: 5/11/2023   Last telemedicine visit with prescribing clinician: Visit date not found   Next office visit with prescribing clinician: 7/27/2023                         Would you like a call back once the refill request has been completed: [] Yes [] No    If the office needs to give you a call back, can they leave a voicemail: [] Yes [] No    Jyoti Santo MA  06/05/23, 09:55 EDT

## 2023-07-27 ENCOUNTER — OFFICE VISIT (OUTPATIENT)
Dept: BEHAVIORAL HEALTH | Facility: CLINIC | Age: 43
End: 2023-07-27
Payer: MEDICARE

## 2023-07-27 VITALS — HEIGHT: 66 IN | WEIGHT: 265 LBS | BODY MASS INDEX: 42.59 KG/M2

## 2023-07-27 DIAGNOSIS — F39 MOOD DISORDER: ICD-10-CM

## 2023-07-27 DIAGNOSIS — F90.0 ATTENTION DEFICIT HYPERACTIVITY DISORDER (ADHD), PREDOMINANTLY INATTENTIVE TYPE: Primary | ICD-10-CM

## 2023-07-27 PROCEDURE — 99214 OFFICE O/P EST MOD 30 MIN: CPT

## 2023-07-27 PROCEDURE — 1160F RVW MEDS BY RX/DR IN RCRD: CPT

## 2023-07-27 PROCEDURE — 1159F MED LIST DOCD IN RCRD: CPT

## 2023-07-27 RX ORDER — ATOMOXETINE 100 MG/1
100 CAPSULE ORAL DAILY
Qty: 90 CAPSULE | Refills: 0 | Status: SHIPPED | OUTPATIENT
Start: 2023-07-27

## 2023-07-27 RX ORDER — BUPROPION HYDROCHLORIDE 150 MG/1
150 TABLET ORAL EVERY MORNING
Qty: 90 TABLET | Refills: 0 | Status: SHIPPED | OUTPATIENT
Start: 2023-07-27

## 2023-07-27 RX ORDER — LAMOTRIGINE 150 MG/1
150 TABLET ORAL 2 TIMES DAILY
Qty: 180 TABLET | Refills: 0 | Status: SHIPPED | OUTPATIENT
Start: 2023-07-27

## 2023-07-27 RX ORDER — DEXTROAMPHETAMINE SACCHARATE, AMPHETAMINE ASPARTATE MONOHYDRATE, DEXTROAMPHETAMINE SULFATE AND AMPHETAMINE SULFATE 1.25; 1.25; 1.25; 1.25 MG/1; MG/1; MG/1; MG/1
5 CAPSULE, EXTENDED RELEASE ORAL 2 TIMES DAILY
Qty: 60 CAPSULE | Refills: 0 | Status: SHIPPED | OUTPATIENT
Start: 2023-07-27

## 2023-07-27 RX ORDER — LANOLIN ALCOHOL/MO/W.PET/CERES
400 CREAM (GRAM) TOPICAL DAILY
COMMUNITY

## 2023-08-01 ENCOUNTER — PRIOR AUTHORIZATION (OUTPATIENT)
Dept: BEHAVIORAL HEALTH | Facility: CLINIC | Age: 43
End: 2023-08-01
Payer: MEDICARE

## 2023-08-08 ENCOUNTER — TELEPHONE (OUTPATIENT)
Dept: INTERNAL MEDICINE | Facility: CLINIC | Age: 43
End: 2023-08-08
Payer: MEDICARE

## 2023-08-08 NOTE — TELEPHONE ENCOUNTER
"Spoke with Neno who reports having \"bad thoughts\", \"dark thoughts\" without active suicidal ideation for the past month and a half.  Crying on the phone.  Endorses feeling the world is broken, they are broken and does not see how anything can ever be fixed.  States there have not been any dark thoughts/bad thoughts for the past year and a half.  Does not recall recent triggering event.  Taking medications as prescribed by PMHNP.  Trying to engage in art/creative endeavors, going to counseling, walking dog (Karly).   [facility].  Neno states there is no danger of self-harm.  Advised to call 911 should dark or bad thoughts worsen.  Neno agrees.  "

## 2023-08-08 NOTE — TELEPHONE ENCOUNTER
"Patient requests a call from PCP only. Patient refused to disclose any information only that \"she has worked with her on this before\". Patient states she does not wish to speak to anyone but Surekha. Phone number verified.   "

## 2023-08-08 NOTE — TELEPHONE ENCOUNTER
Spoke to pt, pt still ahs same feelings, Altagracia is with her right now though    Pt wanted to know if you would talk to Nae, and let them know

## 2023-08-08 NOTE — TELEPHONE ENCOUNTER
"Spoke with EDDIE Her PMHNP, who agrees Neno is using appropriate resources to help alleviate depression, anxiety, \"bad/dark thoughts\".      Called Neno and advised them to continue taking medications as prescribed, talking to supportive friends, eating healthy, staying well hydrated with plenty of water and to avoid social media, be physically active several times throughout the day. Neno is with a friend now, and will stay with her or other friends until symptoms improve.  Neno stated, again, they are having no thoughts of self harm or thoughts of harming others.  Will call 911 should SI/HI develop. Neno agrees with this plan and will reach out if symptoms worsen.    "

## 2023-08-09 ENCOUNTER — HOSPITAL ENCOUNTER (EMERGENCY)
Facility: HOSPITAL | Age: 43
End: 2023-08-09
Attending: EMERGENCY MEDICINE
Payer: MEDICARE

## 2023-08-09 ENCOUNTER — TELEPHONE (OUTPATIENT)
Dept: INTERNAL MEDICINE | Facility: CLINIC | Age: 43
End: 2023-08-09
Payer: MEDICARE

## 2023-08-09 VITALS
BODY MASS INDEX: 40.18 KG/M2 | OXYGEN SATURATION: 97 % | RESPIRATION RATE: 21 BRPM | DIASTOLIC BLOOD PRESSURE: 96 MMHG | HEART RATE: 78 BPM | HEIGHT: 66 IN | TEMPERATURE: 98.6 F | SYSTOLIC BLOOD PRESSURE: 146 MMHG | WEIGHT: 250 LBS

## 2023-08-09 DIAGNOSIS — R45.850 HOMICIDAL IDEATIONS: ICD-10-CM

## 2023-08-09 DIAGNOSIS — R45.851 SUICIDAL IDEATIONS: Primary | ICD-10-CM

## 2023-08-09 PROCEDURE — 99281 EMR DPT VST MAYX REQ PHY/QHP: CPT

## 2023-08-09 PROCEDURE — 99283 EMERGENCY DEPT VISIT LOW MDM: CPT

## 2023-08-09 PROCEDURE — 99284 EMERGENCY DEPT VISIT MOD MDM: CPT

## 2023-08-09 NOTE — TELEPHONE ENCOUNTER
Altagracia patient's peer support is with patient that is having very dark thoughts, suicidal thoughts and plans to carry them out. I advised Altagracia to call 911, she states she will take patient to ER for treatment at the [facility]. Patient is agreeable to go.
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
## 2023-08-09 NOTE — ED NOTES
Staff asked pt to change into paper scrubs, pt walked to the bathroom then shut down. Pt refused to change, staff asked kaykay to speak with the pt, she would not talk to him. Triage rn called and spoke with kaykay. Support person is being called back to speak with kaykay HAMM to make a decision on what to do.

## 2023-08-09 NOTE — CONSULTS
"Behavioral Health Brief Note:    Per chart review, patient in ED due to c/o SI/HI for 1 month and has thoughts of hurting with a gun but no one will sell a gun to pt. Clinician attempted to talk with patient who was at first unable to speak to patient, but patient stated it was okay to talk with the  who was with patient.     Per , she found out a week ago patient took herself off of normal dose of Lamictal and today patient told her it would \"be better off if she was not here\" and was researching how to buy a gun and stated \"it would be a mercy killing\". Per , patient has been to [facility], and [facility] when younger.  stated that she has never seen patient like this. Per , patient has autism and if stressed she is incapable of speaking.     Clinician then met with patient along with . JANAE Johnson with behavioral health was also present for training purposes. Patient stated \"my thoughts feel things are not real anymore\" and \"seeing matrix\". Patient stated \"societal change needs to happen I am the catalyst\". Patient stated \"I know those thoughts are not right\" and \"it will create the change that needs to happen\". Patient stated she \"feels like that you can't live like this\". Patient stated \"these thoughts would be seen as wrong\" and spoke of \"[facility] killing\". Patient stated \"feels like time has led to this moment I would use myself and it would create a shift in the world\" and \"I'm supposed to be one and it will shift the whole world\".     Patient was able to state name, , and that she was at [facility]. However, patient does not appear to be able to give legal consent for inpatient based on presentation during assessment.     Patient appears to be a risk to herself based on patient self-report as well as peer support report. Therefore, clinician at this time " is recommending EMD. Clinician updated JENNY Lan and VANNA Frey.     06:06 PM: UNIQUE currently in the CDU to assess patient for EMD.     -Cesar Cabrera LCSW.   8/9/2023.

## 2023-08-09 NOTE — ED NOTES
Pt is in the exam room. Security at the bedside. Pt has peer support in the consult room, waiting to speak with Cesar HAMM.

## 2023-08-15 ENCOUNTER — TELEPHONE (OUTPATIENT)
Dept: INTERNAL MEDICINE | Facility: CLINIC | Age: 43
End: 2023-08-15
Payer: MEDICARE

## 2023-08-15 NOTE — TELEPHONE ENCOUNTER
Pt was just released from [facility] today and wants to know what medications they need to take. Pt doesn't want to take stimulants,Adderall, Wellbutrin,and Lamictal Does want to take Streterra  Please advise.

## 2023-08-15 NOTE — TELEPHONE ENCOUNTER
Mar states she no longer needs to talk with someone.     Natasha states no longer has questions. Patient is being released from [facility] today.

## 2023-08-15 NOTE — TELEPHONE ENCOUNTER
Spoke with patient, recommended for them to continue taking medication until office visit. Scheduled appointment 8/22/2023

## 2023-08-17 ENCOUNTER — TELEPHONE (OUTPATIENT)
Dept: INTERNAL MEDICINE | Facility: CLINIC | Age: 43
End: 2023-08-17
Payer: MEDICARE

## 2023-08-17 NOTE — TELEPHONE ENCOUNTER
".  Caller: Nicole Altman \"Sy\"    Relationship: Self    Best call back number: [phone]   Who are you requesting to speak with (clinical staff, provider,  specific staff member): CLINICAL    What was the call regarding: IS IT SAFE TO START BACK THE STRATTERA WITH EKG RESULTS    Is it okay if the provider responds through MyChart: CALL BACK       "

## 2023-08-22 ENCOUNTER — OFFICE VISIT (OUTPATIENT)
Dept: BEHAVIORAL HEALTH | Facility: CLINIC | Age: 43
End: 2023-08-22
Payer: MEDICARE

## 2023-08-22 VITALS — BODY MASS INDEX: 40.18 KG/M2 | WEIGHT: 250 LBS | HEIGHT: 66 IN

## 2023-08-22 DIAGNOSIS — F90.0 ATTENTION DEFICIT HYPERACTIVITY DISORDER (ADHD), PREDOMINANTLY INATTENTIVE TYPE: Primary | ICD-10-CM

## 2023-08-22 DIAGNOSIS — F39 MOOD DISORDER: ICD-10-CM

## 2023-08-22 PROCEDURE — 99214 OFFICE O/P EST MOD 30 MIN: CPT

## 2023-08-22 PROCEDURE — 1159F MED LIST DOCD IN RCRD: CPT

## 2023-08-22 PROCEDURE — 1160F RVW MEDS BY RX/DR IN RCRD: CPT

## 2023-08-22 RX ORDER — ATOMOXETINE 40 MG/1
40 CAPSULE ORAL DAILY
Qty: 30 CAPSULE | Refills: 1 | Status: SHIPPED | OUTPATIENT
Start: 2023-08-22 | End: 2023-08-23 | Stop reason: SDUPTHER

## 2023-08-22 RX ORDER — ATOMOXETINE 60 MG/1
60 CAPSULE ORAL DAILY
Qty: 30 CAPSULE | Refills: 1 | Status: SHIPPED | OUTPATIENT
Start: 2023-08-22 | End: 2023-08-23 | Stop reason: SDUPTHER

## 2023-08-22 NOTE — PROGRESS NOTES
Follow Up Office Visit    Patient Name: Nicole Altman  : 1980   MRN: [id number]   Care Team: Patient Care Team:  Laura Jones APRN as PCP - General (Family Medicine)  Jeffrey Schaffer MD as Consulting Physician (General Surgery)  Tracy Her APRN as Nurse Practitioner (Behavioral Health)         Chief Complaint:    Chief Complaint   Patient presents with    ADHD    Mood Disorder    Med Management       History of Present Illness: Nicole Altman is a 42 y.o. adult who is here today for a medication management follow up. Patient states she has not been taking any medication since being discharge from [facility]. She states that she has been doing okay since being discharged. She doesn't feel that she is having the same dark thoughts that she was having prior. She is not interested in starting any of the mood stabilizers or stimulants. She is only wanting to take the Strattera. She feels that is has helped the most with her symptoms. She also endorses having multiple coping skills in place to help with her thoughts. She does struggle in the evening and at night at times. She reports feeling that this when the Strattera wears off, but again she tries to utilize her coping skills. She continues to endorse suicidal thoughts but denies plan and states she has coping skills for those thoughts.      The following portion of the patient's history were reviewed and updated appropriately: allergies, current and past medications, family history, medical history and social history.  Subjective   Review of Systems:    Review of Systems   Psychiatric/Behavioral:  Positive for depressed mood and stress. Negative for suicidal ideas. The patient is nervous/anxious.    All other systems reviewed and are negative.    Current Medications:   Current Outpatient Medications   Medication Sig Dispense Refill    albuterol (PROVENTIL) (2.5 MG/3ML) 0.083% nebulizer solution Take 2.5 mg by nebulization Every 4 (Four)  Hours As Needed for Wheezing. 25 each 0    cloNIDine (Catapres) 0.1 MG tablet Take 1 tablet by mouth 2 (Two) Times a Day As Needed for High Blood Pressure. 60 tablet 1    irbesartan (AVAPRO) 300 MG tablet Take 1 tablet by mouth every night at bedtime. 90 tablet 3    loperamide (IMODIUM) 2 MG capsule Take 1 capsule by mouth 4 (Four) Times a Day As Needed for Diarrhea. (Patient taking differently: Take 1 capsule by mouth As Needed for Diarrhea.) 12 capsule 0    Magnesium Oxide -Mg Supplement 400 (240 Mg) MG tablet Take 1 tablet by mouth Daily.      metoprolol succinate XL (Toprol XL) 100 MG 24 hr tablet Take 1 tablet by mouth Daily. 90 tablet 3    Nebulizers (Compressor/Nebulizer) misc 1 each Every 6 (Six) Hours As Needed (shortness of breath). 1 each 0    ondansetron ODT (ZOFRAN-ODT) 4 MG disintegrating tablet Place 1 tablet on the tongue Every 8 (Eight) Hours As Needed for Nausea or Vomiting. 20 tablet 2    pantoprazole (PROTONIX) 40 MG EC tablet TAKE 1 TABLET BY MOUTH EVERY NIGHT. INDICATIONS: GASTROESOPHAGEAL REFLUX DISEASE 90 tablet 1    SUMAtriptan (IMITREX) 50 MG tablet TAKE 1 TABLET AT ONSET OF HEADACHE. MAY REPEAT DOSE ONE TIME IN 2 HOURS IF HEADACHE NOT RELIEVED 27 tablet 0    atomoxetine (Strattera) 40 MG capsule Take 1 capsule by mouth Daily. 30 capsule 1    atomoxetine (STRATTERA) 60 MG capsule Take 1 capsule by mouth Daily. 30 capsule 1     No current facility-administered medications for this visit.       Mental Status Exam:   Hygiene:   good  Cooperation:  Cooperative  Eye Contact:  Fair  Psychomotor Behavior:  Appropriate  Affect:  Appropriate  Mood: anxious  Speech:  Pressured  Thought Process:  Linear  Thought Content:  Mood congruent  Suicidal:  Suicidal Ideation and denies plan   Homicidal:  None  Hallucinations:  Not demonstrated today  Delusion:  None  Memory:  Intact  Orientation:  Person, Place, Time, and Situation  Reliability:  fair  Insight:  Fair  Judgement:  Fair  Impulse Control:   "Fair  Physical/Medical Issues:  Yes see chart      Objective   Vital Signs:   Ht 167.6 cm (66\")   Wt 113 kg (250 lb)   BMI 40.35 kg/mý       Assessment / Plan    Diagnoses and all orders for this visit:    1. Attention deficit hyperactivity disorder (ADHD), predominantly inattentive type (Primary)  -     atomoxetine (Strattera) 40 MG capsule; Take 1 capsule by mouth Daily.  Dispense: 30 capsule; Refill: 1  -     atomoxetine (STRATTERA) 60 MG capsule; Take 1 capsule by mouth Daily.  Dispense: 30 capsule; Refill: 1    2. Mood disorder  -     atomoxetine (Strattera) 40 MG capsule; Take 1 capsule by mouth Daily.  Dispense: 30 capsule; Refill: 1  -     atomoxetine (STRATTERA) 60 MG capsule; Take 1 capsule by mouth Daily.  Dispense: 30 capsule; Refill: 1       Will change Strattera to 40 mg and 60 mg doses instead of one 100 mg dose. That way patient can dose it twice a day. Discontinue Adderall XR, Wellbutrin, Lamictal and Trileptal for now.     A psychological evaluation was conducted in order to assess past and current level of functioning. Areas assessed included, but were not limited to: perception of social support, perception of ability to face and deal with challenges in life (positive functioning), anxiety symptoms, depressive symptoms, perspective on beliefs/belief system, coping skills for stress, intelligence level,  Therapeutic rapport was established. Interventions conducted today were geared towards incorporating medication management along with support for continued therapy. Education was also provided as to the med management with this provider and what to expect in subsequent sessions.      We discussed risks, benefits, goals and side effects of the above medication and the patient was agreeable with the plan. Patient was educated on the importance of compliance with treatment and follow-up appointments. Patient is aware to contact the [facility] with any worsening of symptoms. To call for " "questions or concerns and return early if necessary. Patent is agreeable to go to the Emergency Department or call 911 should they begin SI/HI.        MEDS ORDERED DURING VISIT:  New Medications Ordered This Visit   Medications    atomoxetine (Strattera) 40 MG capsule     Sig: Take 1 capsule by mouth Daily.     Dispense:  30 capsule     Refill:  1    atomoxetine (STRATTERA) 60 MG capsule     Sig: Take 1 capsule by mouth Daily.     Dispense:  30 capsule     Refill:  1         Follow Up   Return in about 8 weeks (around 10/17/2023).  Patient was given instructions and counseling regarding Nicole NAHED Altman \"Sy\"'s condition or for health maintenance advice. Please see specific information pulled into the AVS if appropriate.     TREATMENT PLAN/GOALS: Continue supportive psychotherapy efforts and medications as indicated. Treatment and medication options discussed during today's visit. Patient acknowledged and verbally consented to continue with current treatment plan and was educated on the importance of compliance with treatment and follow-up appointments.    MEDICATION ISSUES:  Discussed medication options and treatment plan of prescribed medication as well as the risks, benefits, and side effects including potential falls, possible impaired driving and metabolic adversities among others. Patient is agreeable to call the office with any worsening of symptoms or onset of side effects. Patient is agreeable to call 911 or go to the nearest ER should he/she begin having SI/HI.        [facility]  [address] [place] [id number]  [phone]    August 22, 2023 13:02 EDT  "

## 2023-08-23 DIAGNOSIS — F90.0 ATTENTION DEFICIT HYPERACTIVITY DISORDER (ADHD), PREDOMINANTLY INATTENTIVE TYPE: ICD-10-CM

## 2023-08-23 DIAGNOSIS — F39 MOOD DISORDER: ICD-10-CM

## 2023-08-23 RX ORDER — ATOMOXETINE 60 MG/1
60 CAPSULE ORAL DAILY
Qty: 90 CAPSULE | Refills: 0 | Status: SHIPPED | OUTPATIENT
Start: 2023-08-23

## 2023-08-23 RX ORDER — ATOMOXETINE 40 MG/1
40 CAPSULE ORAL DAILY
Qty: 90 CAPSULE | Refills: 0 | Status: SHIPPED | OUTPATIENT
Start: 2023-08-23

## 2023-08-23 NOTE — TELEPHONE ENCOUNTER
Requesting Strattera be sent to [facility].    Rx Refill Note  Requested Prescriptions     Pending Prescriptions Disp Refills    atomoxetine (STRATTERA) 60 MG capsule 30 capsule 1     Sig: Take 1 capsule by mouth Daily.    atomoxetine (Strattera) 40 MG capsule 30 capsule 1     Sig: Take 1 capsule by mouth Daily.      Last office visit with prescribing clinician: 8/22/2023   Last telemedicine visit with prescribing clinician: 4/21/2023   Next office visit with prescribing clinician: 9/26/2023                         Would you like a call back once the refill request has been completed: [] Yes [] No    If the office needs to give you a call back, can they leave a voicemail: [] Yes [] No    Jyoti Santo MA  08/23/23, 10:19 EDT

## 2023-08-23 NOTE — TELEPHONE ENCOUNTER
Incoming Refill Request      Medication requested (name and dose): [facility]    Pharmacy where request should be sent: [facility]    Additional details provided by patient: patient states script went to the wrong pharmacy yesterday.needs sent to [facility]    Best call back number: [phone]    Does the patient have less than a 3 day supply:  [x] Yes  [] No    Nichelle Woods  08/23/23, 09:55 EDT

## 2023-09-13 ENCOUNTER — PATIENT MESSAGE (OUTPATIENT)
Dept: INTERNAL MEDICINE | Facility: CLINIC | Age: 43
End: 2023-09-13
Payer: MEDICARE

## 2023-09-13 DIAGNOSIS — F10.21 HISTORY OF ALCOHOL DEPENDENCE: ICD-10-CM

## 2023-09-13 DIAGNOSIS — F84.0 AUTISM SPECTRUM DISORDER REQUIRING SUPPORT (LEVEL 1): ICD-10-CM

## 2023-09-13 DIAGNOSIS — F39 MOOD DISORDER: Primary | ICD-10-CM

## 2023-09-13 DIAGNOSIS — F31.9 BIPOLAR I DISORDER WITH MIXED FEATURES: ICD-10-CM

## 2023-09-13 DIAGNOSIS — F90.0 ATTENTION DEFICIT HYPERACTIVITY DISORDER (ADHD), PREDOMINANTLY INATTENTIVE TYPE: ICD-10-CM

## 2023-09-13 DIAGNOSIS — F19.11: ICD-10-CM

## 2023-09-15 NOTE — TELEPHONE ENCOUNTER
From: Glenn Early  To: Laura Jones  Sent: 9/13/2023 5:51 PM EDT  Subject: referral for spike jimenez at [facility]    could you please send a referral request to spike jimenez psychiatric nurse lady at the [facility]    i had to find another doctor where [facility] aren't playing nice with each other    thank you for all you guys do!!     glenn early

## 2023-09-18 RX ORDER — IRBESARTAN 300 MG/1
300 TABLET ORAL
Qty: 90 TABLET | Refills: 3 | Status: SHIPPED | OUTPATIENT
Start: 2023-09-18

## 2023-09-19 ENCOUNTER — TELEPHONE (OUTPATIENT)
Dept: BEHAVIORAL HEALTH | Facility: CLINIC | Age: 43
End: 2023-09-19
Payer: MEDICARE

## 2023-09-19 NOTE — TELEPHONE ENCOUNTER
LVM on patient's phone. Need more information on exactly what she is needing from us to the [facility], if it is a referral for her to be seen in patient, if she is currently admitted, etc.    If they are wanting records, we will need a medical release of information filled out.

## 2023-09-20 DIAGNOSIS — F39 MOOD DISORDER: ICD-10-CM

## 2023-09-20 DIAGNOSIS — F90.0 ATTENTION DEFICIT HYPERACTIVITY DISORDER (ADHD), PREDOMINANTLY INATTENTIVE TYPE: Primary | ICD-10-CM

## 2023-09-20 NOTE — TELEPHONE ENCOUNTER
Spoke with [facility], since she will start seeing Yoon Brunner for her medication management insurance is requiring a referral to ensure that is okay for them to take over her medications.    Fax: [phone]

## 2023-09-20 NOTE — TELEPHONE ENCOUNTER
Patient is needing a referral for Yoon LEES at [facility] for medication management for after she is discharged.

## 2023-10-20 ENCOUNTER — PATIENT MESSAGE (OUTPATIENT)
Dept: INTERNAL MEDICINE | Facility: CLINIC | Age: 43
End: 2023-10-20
Payer: MEDICARE

## 2023-10-20 DIAGNOSIS — F10.21 HISTORY OF ALCOHOL DEPENDENCE: ICD-10-CM

## 2023-10-20 DIAGNOSIS — F84.0 AUTISM SPECTRUM DISORDER REQUIRING SUPPORT (LEVEL 1): ICD-10-CM

## 2023-10-20 DIAGNOSIS — F31.9 BIPOLAR I DISORDER WITH MIXED FEATURES: ICD-10-CM

## 2023-10-20 DIAGNOSIS — F32.81 PREMENSTRUAL DYSPHORIC DISORDER: ICD-10-CM

## 2023-10-20 DIAGNOSIS — F90.0 ATTENTION DEFICIT HYPERACTIVITY DISORDER (ADHD), PREDOMINANTLY INATTENTIVE TYPE: ICD-10-CM

## 2023-10-20 DIAGNOSIS — F19.11: ICD-10-CM

## 2023-10-20 DIAGNOSIS — F39 MOOD DISORDER: Primary | ICD-10-CM

## 2023-10-20 NOTE — TELEPHONE ENCOUNTER
From: Nicole Early  To: Laura Robert  Sent: 10/20/2023 11:04 AM EDT  Subject: reffarral    can you please send some kind of referral to for spike jimenez at the [facility]? like a long term reffaral?    they won't let me see her without one even though i've been seeing her    they said the refarral they got was only for one visit??    i know you're not my doctor but i don't know what to do    barbara was helping me find a new doctor but it's takes a minute    i appreciate any help you can give    marlena early

## 2024-11-14 RX ORDER — IRBESARTAN 300 MG/1
300 TABLET ORAL
Qty: 90 TABLET | Refills: 0 | OUTPATIENT
Start: 2024-11-14